# Patient Record
Sex: FEMALE | Race: WHITE | NOT HISPANIC OR LATINO
[De-identification: names, ages, dates, MRNs, and addresses within clinical notes are randomized per-mention and may not be internally consistent; named-entity substitution may affect disease eponyms.]

---

## 2017-02-20 ENCOUNTER — APPOINTMENT (OUTPATIENT)
Dept: SURGERY | Facility: CLINIC | Age: 67
End: 2017-02-20

## 2017-03-31 ENCOUNTER — APPOINTMENT (OUTPATIENT)
Dept: ULTRASOUND IMAGING | Facility: CLINIC | Age: 67
End: 2017-03-31

## 2017-03-31 ENCOUNTER — OUTPATIENT (OUTPATIENT)
Dept: OUTPATIENT SERVICES | Facility: HOSPITAL | Age: 67
LOS: 1 days | End: 2017-03-31
Payer: COMMERCIAL

## 2017-03-31 ENCOUNTER — APPOINTMENT (OUTPATIENT)
Dept: MAMMOGRAPHY | Facility: CLINIC | Age: 67
End: 2017-03-31

## 2017-03-31 DIAGNOSIS — N60.02 SOLITARY CYST OF LEFT BREAST: Chronic | ICD-10-CM

## 2017-03-31 DIAGNOSIS — Z98.89 OTHER SPECIFIED POSTPROCEDURAL STATES: Chronic | ICD-10-CM

## 2017-03-31 DIAGNOSIS — Z00.8 ENCOUNTER FOR OTHER GENERAL EXAMINATION: ICD-10-CM

## 2017-03-31 PROCEDURE — 77066 DX MAMMO INCL CAD BI: CPT

## 2017-03-31 PROCEDURE — 76641 ULTRASOUND BREAST COMPLETE: CPT

## 2017-03-31 PROCEDURE — G0279: CPT

## 2017-11-22 ENCOUNTER — APPOINTMENT (OUTPATIENT)
Dept: SURGERY | Facility: CLINIC | Age: 67
End: 2017-11-22
Payer: COMMERCIAL

## 2017-11-22 PROCEDURE — 99213K: CUSTOM

## 2018-04-11 ENCOUNTER — OUTPATIENT (OUTPATIENT)
Dept: OUTPATIENT SERVICES | Facility: HOSPITAL | Age: 68
LOS: 1 days | End: 2018-04-11
Payer: COMMERCIAL

## 2018-04-11 ENCOUNTER — APPOINTMENT (OUTPATIENT)
Dept: MAMMOGRAPHY | Facility: CLINIC | Age: 68
End: 2018-04-11

## 2018-04-11 ENCOUNTER — APPOINTMENT (OUTPATIENT)
Dept: ULTRASOUND IMAGING | Facility: CLINIC | Age: 68
End: 2018-04-11

## 2018-04-11 DIAGNOSIS — N60.02 SOLITARY CYST OF LEFT BREAST: Chronic | ICD-10-CM

## 2018-04-11 DIAGNOSIS — Z00.8 ENCOUNTER FOR OTHER GENERAL EXAMINATION: ICD-10-CM

## 2018-04-11 DIAGNOSIS — Z98.89 OTHER SPECIFIED POSTPROCEDURAL STATES: Chronic | ICD-10-CM

## 2018-04-11 PROCEDURE — G0279: CPT

## 2018-04-11 PROCEDURE — 76641 ULTRASOUND BREAST COMPLETE: CPT | Mod: 26,LT

## 2018-04-11 PROCEDURE — 77066 DX MAMMO INCL CAD BI: CPT | Mod: 26

## 2018-04-11 PROCEDURE — G0279: CPT | Mod: 26

## 2018-04-11 PROCEDURE — 77066 DX MAMMO INCL CAD BI: CPT

## 2018-04-11 PROCEDURE — 76641 ULTRASOUND BREAST COMPLETE: CPT

## 2018-10-10 ENCOUNTER — APPOINTMENT (OUTPATIENT)
Dept: SURGERY | Facility: CLINIC | Age: 68
End: 2018-10-10
Payer: COMMERCIAL

## 2018-10-10 PROCEDURE — 99213K: CUSTOM

## 2019-04-22 ENCOUNTER — APPOINTMENT (OUTPATIENT)
Dept: MAMMOGRAPHY | Facility: CLINIC | Age: 69
End: 2019-04-22
Payer: COMMERCIAL

## 2019-04-22 ENCOUNTER — OUTPATIENT (OUTPATIENT)
Dept: OUTPATIENT SERVICES | Facility: HOSPITAL | Age: 69
LOS: 1 days | End: 2019-04-22
Payer: COMMERCIAL

## 2019-04-22 ENCOUNTER — APPOINTMENT (OUTPATIENT)
Dept: ULTRASOUND IMAGING | Facility: CLINIC | Age: 69
End: 2019-04-22
Payer: COMMERCIAL

## 2019-04-22 DIAGNOSIS — N60.02 SOLITARY CYST OF LEFT BREAST: Chronic | ICD-10-CM

## 2019-04-22 DIAGNOSIS — Z98.89 OTHER SPECIFIED POSTPROCEDURAL STATES: Chronic | ICD-10-CM

## 2019-04-22 DIAGNOSIS — Z00.8 ENCOUNTER FOR OTHER GENERAL EXAMINATION: ICD-10-CM

## 2019-04-22 PROCEDURE — 77063 BREAST TOMOSYNTHESIS BI: CPT

## 2019-04-22 PROCEDURE — 77067 SCR MAMMO BI INCL CAD: CPT

## 2019-04-22 PROCEDURE — 76641 ULTRASOUND BREAST COMPLETE: CPT | Mod: 26,LT

## 2019-04-22 PROCEDURE — 77063 BREAST TOMOSYNTHESIS BI: CPT | Mod: 26

## 2019-04-22 PROCEDURE — 77067 SCR MAMMO BI INCL CAD: CPT | Mod: 26

## 2019-04-22 PROCEDURE — 76641 ULTRASOUND BREAST COMPLETE: CPT | Mod: 26,RT

## 2019-04-22 PROCEDURE — 76641 ULTRASOUND BREAST COMPLETE: CPT

## 2019-10-14 ENCOUNTER — APPOINTMENT (OUTPATIENT)
Dept: SURGERY | Facility: CLINIC | Age: 69
End: 2019-10-14
Payer: COMMERCIAL

## 2019-10-14 PROCEDURE — 99213K: CUSTOM

## 2020-05-18 ENCOUNTER — RESULT REVIEW (OUTPATIENT)
Age: 70
End: 2020-05-18

## 2020-05-18 ENCOUNTER — APPOINTMENT (OUTPATIENT)
Dept: ULTRASOUND IMAGING | Facility: CLINIC | Age: 70
End: 2020-05-18
Payer: COMMERCIAL

## 2020-05-18 ENCOUNTER — APPOINTMENT (OUTPATIENT)
Dept: MAMMOGRAPHY | Facility: CLINIC | Age: 70
End: 2020-05-18
Payer: COMMERCIAL

## 2020-05-18 ENCOUNTER — OUTPATIENT (OUTPATIENT)
Dept: OUTPATIENT SERVICES | Facility: HOSPITAL | Age: 70
LOS: 1 days | End: 2020-05-18
Payer: COMMERCIAL

## 2020-05-18 DIAGNOSIS — Z98.89 OTHER SPECIFIED POSTPROCEDURAL STATES: Chronic | ICD-10-CM

## 2020-05-18 DIAGNOSIS — N60.02 SOLITARY CYST OF LEFT BREAST: Chronic | ICD-10-CM

## 2020-05-18 DIAGNOSIS — Z00.8 ENCOUNTER FOR OTHER GENERAL EXAMINATION: ICD-10-CM

## 2020-05-18 PROCEDURE — 76641 ULTRASOUND BREAST COMPLETE: CPT | Mod: 26,RT

## 2020-05-18 PROCEDURE — 77067 SCR MAMMO BI INCL CAD: CPT | Mod: 26

## 2020-05-18 PROCEDURE — 76641 ULTRASOUND BREAST COMPLETE: CPT | Mod: 26,LT

## 2020-05-18 PROCEDURE — 76641 ULTRASOUND BREAST COMPLETE: CPT

## 2020-05-18 PROCEDURE — 77063 BREAST TOMOSYNTHESIS BI: CPT | Mod: 26

## 2020-05-18 PROCEDURE — 77063 BREAST TOMOSYNTHESIS BI: CPT

## 2020-05-18 PROCEDURE — 77067 SCR MAMMO BI INCL CAD: CPT

## 2020-11-23 ENCOUNTER — APPOINTMENT (OUTPATIENT)
Dept: SURGERY | Facility: CLINIC | Age: 70
End: 2020-11-23
Payer: COMMERCIAL

## 2020-11-23 PROCEDURE — 99213K: CUSTOM

## 2021-05-19 ENCOUNTER — OUTPATIENT (OUTPATIENT)
Dept: OUTPATIENT SERVICES | Facility: HOSPITAL | Age: 71
LOS: 1 days | End: 2021-05-19
Payer: COMMERCIAL

## 2021-05-19 ENCOUNTER — RESULT REVIEW (OUTPATIENT)
Age: 71
End: 2021-05-19

## 2021-05-19 ENCOUNTER — APPOINTMENT (OUTPATIENT)
Dept: MAMMOGRAPHY | Facility: CLINIC | Age: 71
End: 2021-05-19
Payer: COMMERCIAL

## 2021-05-19 ENCOUNTER — APPOINTMENT (OUTPATIENT)
Dept: ULTRASOUND IMAGING | Facility: CLINIC | Age: 71
End: 2021-05-19
Payer: COMMERCIAL

## 2021-05-19 DIAGNOSIS — Z98.89 OTHER SPECIFIED POSTPROCEDURAL STATES: Chronic | ICD-10-CM

## 2021-05-19 DIAGNOSIS — N60.02 SOLITARY CYST OF LEFT BREAST: Chronic | ICD-10-CM

## 2021-05-19 DIAGNOSIS — Z00.8 ENCOUNTER FOR OTHER GENERAL EXAMINATION: ICD-10-CM

## 2021-05-19 PROCEDURE — 77065 DX MAMMO INCL CAD UNI: CPT

## 2021-05-19 PROCEDURE — 77063 BREAST TOMOSYNTHESIS BI: CPT

## 2021-05-19 PROCEDURE — 77067 SCR MAMMO BI INCL CAD: CPT | Mod: 26,59

## 2021-05-19 PROCEDURE — 77065 DX MAMMO INCL CAD UNI: CPT | Mod: 26,GG,RT

## 2021-05-19 PROCEDURE — 77063 BREAST TOMOSYNTHESIS BI: CPT | Mod: 26,59

## 2021-05-19 PROCEDURE — 77067 SCR MAMMO BI INCL CAD: CPT

## 2021-05-19 PROCEDURE — 76641 ULTRASOUND BREAST COMPLETE: CPT | Mod: 26,RT

## 2021-05-19 PROCEDURE — 76641 ULTRASOUND BREAST COMPLETE: CPT | Mod: 26,LT

## 2021-05-19 PROCEDURE — 76641 ULTRASOUND BREAST COMPLETE: CPT

## 2021-05-24 ENCOUNTER — RESULT REVIEW (OUTPATIENT)
Age: 71
End: 2021-05-24

## 2021-05-24 ENCOUNTER — APPOINTMENT (OUTPATIENT)
Dept: ULTRASOUND IMAGING | Facility: CLINIC | Age: 71
End: 2021-05-24
Payer: COMMERCIAL

## 2021-05-24 ENCOUNTER — OUTPATIENT (OUTPATIENT)
Dept: OUTPATIENT SERVICES | Facility: HOSPITAL | Age: 71
LOS: 1 days | End: 2021-05-24
Payer: COMMERCIAL

## 2021-05-24 DIAGNOSIS — Z98.89 OTHER SPECIFIED POSTPROCEDURAL STATES: Chronic | ICD-10-CM

## 2021-05-24 DIAGNOSIS — N60.02 SOLITARY CYST OF LEFT BREAST: Chronic | ICD-10-CM

## 2021-05-24 DIAGNOSIS — R92.8 OTHER ABNORMAL AND INCONCLUSIVE FINDINGS ON DIAGNOSTIC IMAGING OF BREAST: ICD-10-CM

## 2021-05-24 DIAGNOSIS — Z00.8 ENCOUNTER FOR OTHER GENERAL EXAMINATION: ICD-10-CM

## 2021-05-24 PROCEDURE — 88305 TISSUE EXAM BY PATHOLOGIST: CPT | Mod: 26

## 2021-05-24 PROCEDURE — 88341 IMHCHEM/IMCYTCHM EA ADD ANTB: CPT

## 2021-05-24 PROCEDURE — 19083 BX BREAST 1ST LESION US IMAG: CPT

## 2021-05-24 PROCEDURE — 88360 TUMOR IMMUNOHISTOCHEM/MANUAL: CPT

## 2021-05-24 PROCEDURE — 77065 DX MAMMO INCL CAD UNI: CPT | Mod: 26,RT

## 2021-05-24 PROCEDURE — 88360 TUMOR IMMUNOHISTOCHEM/MANUAL: CPT | Mod: 26

## 2021-05-24 PROCEDURE — 77065 DX MAMMO INCL CAD UNI: CPT

## 2021-05-24 PROCEDURE — 88305 TISSUE EXAM BY PATHOLOGIST: CPT

## 2021-05-24 PROCEDURE — 88342 IMHCHEM/IMCYTCHM 1ST ANTB: CPT | Mod: 26,59

## 2021-05-24 PROCEDURE — A4648: CPT

## 2021-05-24 PROCEDURE — 19083 BX BREAST 1ST LESION US IMAG: CPT | Mod: RT

## 2021-05-27 ENCOUNTER — APPOINTMENT (OUTPATIENT)
Dept: MRI IMAGING | Facility: CLINIC | Age: 71
End: 2021-05-27
Payer: COMMERCIAL

## 2021-05-27 ENCOUNTER — OUTPATIENT (OUTPATIENT)
Dept: OUTPATIENT SERVICES | Facility: HOSPITAL | Age: 71
LOS: 1 days | End: 2021-05-27
Payer: COMMERCIAL

## 2021-05-27 ENCOUNTER — RESULT REVIEW (OUTPATIENT)
Age: 71
End: 2021-05-27

## 2021-05-27 DIAGNOSIS — N60.02 SOLITARY CYST OF LEFT BREAST: Chronic | ICD-10-CM

## 2021-05-27 DIAGNOSIS — Z98.89 OTHER SPECIFIED POSTPROCEDURAL STATES: Chronic | ICD-10-CM

## 2021-05-27 DIAGNOSIS — Z00.8 ENCOUNTER FOR OTHER GENERAL EXAMINATION: ICD-10-CM

## 2021-05-27 PROCEDURE — A9585: CPT

## 2021-05-27 PROCEDURE — 77049 MRI BREAST C-+ W/CAD BI: CPT | Mod: 26

## 2021-05-27 PROCEDURE — C8908: CPT

## 2021-05-27 PROCEDURE — C8937: CPT

## 2021-06-08 ENCOUNTER — OUTPATIENT (OUTPATIENT)
Dept: OUTPATIENT SERVICES | Facility: HOSPITAL | Age: 71
LOS: 1 days | End: 2021-06-08
Payer: COMMERCIAL

## 2021-06-08 VITALS
HEART RATE: 68 BPM | HEIGHT: 62 IN | TEMPERATURE: 98 F | DIASTOLIC BLOOD PRESSURE: 72 MMHG | OXYGEN SATURATION: 98 % | SYSTOLIC BLOOD PRESSURE: 118 MMHG | RESPIRATION RATE: 14 BRPM | WEIGHT: 136.91 LBS

## 2021-06-08 DIAGNOSIS — Z98.89 OTHER SPECIFIED POSTPROCEDURAL STATES: Chronic | ICD-10-CM

## 2021-06-08 DIAGNOSIS — C50.911 MALIGNANT NEOPLASM OF UNSPECIFIED SITE OF RIGHT FEMALE BREAST: ICD-10-CM

## 2021-06-08 DIAGNOSIS — C80.1 MALIGNANT (PRIMARY) NEOPLASM, UNSPECIFIED: ICD-10-CM

## 2021-06-08 DIAGNOSIS — Z98.890 OTHER SPECIFIED POSTPROCEDURAL STATES: Chronic | ICD-10-CM

## 2021-06-08 DIAGNOSIS — N60.02 SOLITARY CYST OF LEFT BREAST: Chronic | ICD-10-CM

## 2021-06-08 LAB
ALBUMIN SERPL ELPH-MCNC: 4.4 G/DL — SIGNIFICANT CHANGE UP (ref 3.3–5)
ALP SERPL-CCNC: 49 U/L — SIGNIFICANT CHANGE UP (ref 40–120)
ALT FLD-CCNC: 36 U/L — HIGH (ref 4–33)
ANION GAP SERPL CALC-SCNC: 14 MMOL/L — SIGNIFICANT CHANGE UP (ref 7–14)
AST SERPL-CCNC: 29 U/L — SIGNIFICANT CHANGE UP (ref 4–32)
BILIRUB SERPL-MCNC: 0.8 MG/DL — SIGNIFICANT CHANGE UP (ref 0.2–1.2)
BUN SERPL-MCNC: 31 MG/DL — HIGH (ref 7–23)
CALCIUM SERPL-MCNC: 9.3 MG/DL — SIGNIFICANT CHANGE UP (ref 8.4–10.5)
CHLORIDE SERPL-SCNC: 95 MMOL/L — LOW (ref 98–107)
CO2 SERPL-SCNC: 25 MMOL/L — SIGNIFICANT CHANGE UP (ref 22–31)
CREAT SERPL-MCNC: 1.19 MG/DL — SIGNIFICANT CHANGE UP (ref 0.5–1.3)
GLUCOSE SERPL-MCNC: 94 MG/DL — SIGNIFICANT CHANGE UP (ref 70–99)
HCT VFR BLD CALC: 39 % — SIGNIFICANT CHANGE UP (ref 34.5–45)
HGB BLD-MCNC: 13.5 G/DL — SIGNIFICANT CHANGE UP (ref 11.5–15.5)
MCHC RBC-ENTMCNC: 30.2 PG — SIGNIFICANT CHANGE UP (ref 27–34)
MCHC RBC-ENTMCNC: 34.6 GM/DL — SIGNIFICANT CHANGE UP (ref 32–36)
MCV RBC AUTO: 87.2 FL — SIGNIFICANT CHANGE UP (ref 80–100)
NRBC # BLD: 0 /100 WBCS — SIGNIFICANT CHANGE UP
NRBC # FLD: 0 K/UL — SIGNIFICANT CHANGE UP
PLATELET # BLD AUTO: 339 K/UL — SIGNIFICANT CHANGE UP (ref 150–400)
POTASSIUM SERPL-MCNC: 3.4 MMOL/L — LOW (ref 3.5–5.3)
POTASSIUM SERPL-SCNC: 3.4 MMOL/L — LOW (ref 3.5–5.3)
PROT SERPL-MCNC: 7.2 G/DL — SIGNIFICANT CHANGE UP (ref 6–8.3)
RBC # BLD: 4.47 M/UL — SIGNIFICANT CHANGE UP (ref 3.8–5.2)
RBC # FLD: 13 % — SIGNIFICANT CHANGE UP (ref 10.3–14.5)
SODIUM SERPL-SCNC: 134 MMOL/L — LOW (ref 135–145)
WBC # BLD: 8.01 K/UL — SIGNIFICANT CHANGE UP (ref 3.8–10.5)
WBC # FLD AUTO: 8.01 K/UL — SIGNIFICANT CHANGE UP (ref 3.8–10.5)

## 2021-06-08 PROCEDURE — 93010 ELECTROCARDIOGRAM REPORT: CPT

## 2021-06-08 RX ORDER — SODIUM CHLORIDE 9 MG/ML
1000 INJECTION, SOLUTION INTRAVENOUS
Refills: 0 | Status: DISCONTINUED | OUTPATIENT
Start: 2021-06-15 | End: 2021-06-15

## 2021-06-08 NOTE — H&P PST ADULT - NSICDXPASTSURGICALHX_GEN_ALL_CORE_FT
PAST SURGICAL HISTORY:  Breast cyst, left 1997    H/O partial thyroidectomy 1995 Left     PAST SURGICAL HISTORY:  Breast cyst, left 1997    H/O partial thyroidectomy 1995 Left    S/P breast lumpectomy right 2014

## 2021-06-08 NOTE — H&P PST ADULT - HISTORY OF PRESENT ILLNESS
72y/o female scheduled for bilateral simple mastectomies on 6/15/2021.  Pt states, "hx of right breast cancer, s/p lumpectomy 2014, received chemo and radiation, now on letrozole.  Went for f/y mammogram and US found mass in right breast, biopsy positive for malignancy."

## 2021-06-08 NOTE — H&P PST ADULT - NSICDXPASTMEDICALHX_GEN_ALL_CORE_FT
PAST MEDICAL HISTORY:  H/O thyroid nodule     Hypertension      PAST MEDICAL HISTORY:  H/O thyroid nodule     Hypertension     Malignant neoplasm of breast s/p chemo and radiation 2014

## 2021-06-08 NOTE — H&P PST ADULT - NSICDXPROBLEM_GEN_ALL_CORE_FT
PROBLEM DIAGNOSES  Problem: Malignant neoplasm  Assessment and Plan: Pt scheduled for bilateral simple mastectomies on 6/15/2021.  labs done results pending, ekg done.  Hibiclens provided-  written and verbal instructions given, pt able to verabalize understanding.  Preop teaching done, pt able to verabalize understanding.   meds day of surgery- losartan, pepcid.  OR booking notified of ALO precautions.

## 2021-06-10 PROBLEM — C50.919 MALIGNANT NEOPLASM OF UNSPECIFIED SITE OF UNSPECIFIED FEMALE BREAST: Chronic | Status: ACTIVE | Noted: 2021-06-08

## 2021-06-12 ENCOUNTER — APPOINTMENT (OUTPATIENT)
Dept: DISASTER EMERGENCY | Facility: CLINIC | Age: 71
End: 2021-06-12

## 2021-06-12 DIAGNOSIS — Z01.818 ENCOUNTER FOR OTHER PREPROCEDURAL EXAMINATION: ICD-10-CM

## 2021-06-13 LAB — SARS-COV-2 N GENE NPH QL NAA+PROBE: NOT DETECTED

## 2021-06-15 ENCOUNTER — RESULT REVIEW (OUTPATIENT)
Age: 71
End: 2021-06-15

## 2021-06-15 ENCOUNTER — APPOINTMENT (OUTPATIENT)
Dept: SURGERY | Facility: HOSPITAL | Age: 71
End: 2021-06-15

## 2021-06-15 ENCOUNTER — INPATIENT (INPATIENT)
Facility: HOSPITAL | Age: 71
LOS: 0 days | Discharge: HOME | End: 2021-06-16
Attending: SURGERY | Admitting: SURGERY
Payer: COMMERCIAL

## 2021-06-15 VITALS
HEART RATE: 60 BPM | HEIGHT: 62 IN | DIASTOLIC BLOOD PRESSURE: 70 MMHG | WEIGHT: 134.92 LBS | RESPIRATION RATE: 12 BRPM | SYSTOLIC BLOOD PRESSURE: 148 MMHG

## 2021-06-15 DIAGNOSIS — Z90.10 ACQUIRED ABSENCE OF UNSPECIFIED BREAST AND NIPPLE: Chronic | ICD-10-CM

## 2021-06-15 DIAGNOSIS — N60.02 SOLITARY CYST OF LEFT BREAST: Chronic | ICD-10-CM

## 2021-06-15 DIAGNOSIS — C50.911 MALIGNANT NEOPLASM OF UNSPECIFIED SITE OF RIGHT FEMALE BREAST: ICD-10-CM

## 2021-06-15 DIAGNOSIS — Z98.89 OTHER SPECIFIED POSTPROCEDURAL STATES: Chronic | ICD-10-CM

## 2021-06-15 DIAGNOSIS — Z98.890 OTHER SPECIFIED POSTPROCEDURAL STATES: Chronic | ICD-10-CM

## 2021-06-15 PROCEDURE — 88307 TISSUE EXAM BY PATHOLOGIST: CPT | Mod: 26

## 2021-06-15 RX ORDER — HYDROMORPHONE HYDROCHLORIDE 2 MG/ML
0.5 INJECTION INTRAMUSCULAR; INTRAVENOUS; SUBCUTANEOUS
Refills: 0 | Status: DISCONTINUED | OUTPATIENT
Start: 2021-06-15 | End: 2021-06-15

## 2021-06-15 RX ORDER — LOSARTAN POTASSIUM 100 MG/1
100 TABLET, FILM COATED ORAL DAILY
Refills: 0 | Status: DISCONTINUED | OUTPATIENT
Start: 2021-06-15 | End: 2021-06-16

## 2021-06-15 RX ORDER — FENTANYL CITRATE 50 UG/ML
25 INJECTION INTRAVENOUS
Refills: 0 | Status: DISCONTINUED | OUTPATIENT
Start: 2021-06-15 | End: 2021-06-15

## 2021-06-15 RX ORDER — HYDROCHLOROTHIAZIDE 25 MG
12.5 TABLET ORAL DAILY
Refills: 0 | Status: DISCONTINUED | OUTPATIENT
Start: 2021-06-15 | End: 2021-06-16

## 2021-06-15 RX ORDER — OXYCODONE HYDROCHLORIDE 5 MG/1
5 TABLET ORAL EVERY 6 HOURS
Refills: 0 | Status: DISCONTINUED | OUTPATIENT
Start: 2021-06-15 | End: 2021-06-16

## 2021-06-15 RX ORDER — LETROZOLE 2.5 MG/1
2.5 TABLET, FILM COATED ORAL DAILY
Refills: 0 | Status: DISCONTINUED | OUTPATIENT
Start: 2021-06-15 | End: 2021-06-15

## 2021-06-15 RX ORDER — HEPARIN SODIUM 5000 [USP'U]/ML
5000 INJECTION INTRAVENOUS; SUBCUTANEOUS EVERY 12 HOURS
Refills: 0 | Status: DISCONTINUED | OUTPATIENT
Start: 2021-06-15 | End: 2021-06-16

## 2021-06-15 RX ORDER — ONDANSETRON 8 MG/1
4 TABLET, FILM COATED ORAL ONCE
Refills: 0 | Status: DISCONTINUED | OUTPATIENT
Start: 2021-06-15 | End: 2021-06-15

## 2021-06-15 RX ORDER — ACETAMINOPHEN 500 MG
650 TABLET ORAL EVERY 6 HOURS
Refills: 0 | Status: DISCONTINUED | OUTPATIENT
Start: 2021-06-15 | End: 2021-06-16

## 2021-06-15 RX ADMIN — HEPARIN SODIUM 5000 UNIT(S): 5000 INJECTION INTRAVENOUS; SUBCUTANEOUS at 17:28

## 2021-06-15 NOTE — ASU PREOP CHECKLIST - COMMENTS
losartan, letrozole and famotidine at 5:30am with sip water losartan, letrozole, HCTZ and famotidine at 5:30am with sip water

## 2021-06-15 NOTE — CHART NOTE - NSCHARTNOTEFT_GEN_A_CORE
Post Operative Note  Patient: SHADIA RIVAS 71y (1950) Female   MRN: [id number]  Location: [place] [address]  Visit: 06-15-21 Inpatient  Date: 06-15-21 @ 17:21    Procedure: S/P bilateral simple mastectomies, 2 hemovac    Subjective: Patient seen and examined post operatively in the PACU. Patient states pain is controlled. Denies nausea, vomiting. Tolerating regular diet. Denies fever, chills, cough.      Objective:  Vitals: T(F): 97.8 (06-15-21 @ 15:00), Max: 97.8 (06-15-21 @ 15:00)  HR: 83 (06-15-21 @ 17:00)  BP: 126/55 (06-15-21 @ 17:00) (102/54 - 148/70)  RR: 19 (06-15-21 @ 17:00)  SpO2: 97% (06-15-21 @ 17:00)  Vent Settings:     In:   06-15-21 @ 07:01  -  06-15-21 @ 17:21  --------------------------------------------------------  IN: 0 mL      IV Fluids: lactated ringers. 1000 milliLiter(s) (30 mL/Hr) IV Continuous <Continuous>      Out:   06-15-21 @ 07:01  -  06-15-21 @ 17:21  --------------------------------------------------------  OUT: 0 mL      EBL:     Voided Urine:   06-15-21 @ 07:01  -  06-15-21 @ 17:21  --------------------------------------------------------  OUT: 0 mL      Mojica Catheter: yes no   Drains:   ANSON:   06-15-21 @ 07:01  -  06-15-21 @ 17:21  --------------------------------------------------------  OUT: 0 mL        Physical Examination:  General: NAD, resting comfortably in bed  HEENT: Normocephalic atraumatic  Respiratory: Nonlabored respirations, normal CW expansion.  Cardio: S1S2, regular rate and rhythm.  Chest wall: CW dressed with compression dressing, no strike through, bilateral hemovacs with minimal sanguinous output.   Abdomen: soft, nondistended, nontender.  Vascular: extremities are warm and well perfused.     Imaging:  No post-op imaging studies    Assessment:  71yFemale patient S/p bilateral simple mastecomies recovering well post operatively.    Plan:  - hemovac drain teaching  - Pain control PRN  - Diet: regular  - Activity: OOB/Ambulate as tolerated.  - DVT ppx: [facility]    Date/Time: 06-15-21 @ 17:21

## 2021-06-16 ENCOUNTER — TRANSCRIPTION ENCOUNTER (OUTPATIENT)
Age: 71
End: 2021-06-16

## 2021-06-16 VITALS
TEMPERATURE: 98 F | RESPIRATION RATE: 18 BRPM | SYSTOLIC BLOOD PRESSURE: 115 MMHG | DIASTOLIC BLOOD PRESSURE: 55 MMHG | OXYGEN SATURATION: 97 % | HEART RATE: 63 BPM

## 2021-06-16 LAB
COVID-19 SPIKE DOMAIN AB INTERP: POSITIVE
COVID-19 SPIKE DOMAIN ANTIBODY RESULT: >250 U/ML — HIGH
SARS-COV-2 IGG+IGM SERPL QL IA: >250 U/ML — HIGH
SARS-COV-2 IGG+IGM SERPL QL IA: POSITIVE

## 2021-06-16 RX ORDER — OXYCODONE HYDROCHLORIDE 5 MG/1
1 TABLET ORAL
Qty: 15 | Refills: 0
Start: 2021-06-16

## 2021-06-16 RX ORDER — OXYCODONE HYDROCHLORIDE 5 MG/1
1 TABLET ORAL
Qty: 0 | Refills: 0 | DISCHARGE
Start: 2021-06-16

## 2021-06-16 RX ORDER — ACETAMINOPHEN 500 MG
2 TABLET ORAL
Qty: 0 | Refills: 0 | DISCHARGE
Start: 2021-06-16

## 2021-06-16 RX ADMIN — HEPARIN SODIUM 5000 UNIT(S): 5000 INJECTION INTRAVENOUS; SUBCUTANEOUS at 05:37

## 2021-06-16 RX ADMIN — Medication 12.5 MILLIGRAM(S): at 05:36

## 2021-06-16 RX ADMIN — LOSARTAN POTASSIUM 100 MILLIGRAM(S): 100 TABLET, FILM COATED ORAL at 05:36

## 2021-06-16 NOTE — PROGRESS NOTE ADULT - ASSESSMENT
71F POD1 bilateral simple mastectomies recovering appropriately    Plan:  - hemovac drain teaching  - Pain control PRN  - Diet: regular  - Activity: OOB/Ambulate as tolerated.  - DVT ppx: [facility]    Surgery Team D  [id number]

## 2021-06-16 NOTE — DISCHARGE NOTE NURSING/CASE MANAGEMENT/SOCIAL WORK - NSDCPECAREGIVERED_GEN_ALL_CORE
carenotes on mastectomy, caring for my incision action plan, hemovac drain carenotes, managing pain handout

## 2021-06-16 NOTE — DISCHARGE NOTE PROVIDER - CARE PROVIDER_API CALL
Zunilda Yuen)  [facility]  [address], [address]  [place], [place] [id number]  Phone: [phone]  Fax: [phone]  Follow Up Time: 1 week

## 2021-06-16 NOTE — DISCHARGE NOTE PROVIDER - NSDCMRMEDTOKEN_GEN_ALL_CORE_FT
acetaminophen 325 mg oral capsule: 2 tab(s) orally every 6 hours, As Needed mild pain  hydroCHLOROthiazide 12.5 mg oral capsule: 1 cap(s) orally once a day  letrozole 2.5 mg oral tablet: 1 tab(s) orally once a day  losartan 100 mg oral tablet: 1 tab(s) orally once a day  oxyCODONE 5 mg oral tablet: 1 tab(s) orally every 4 hours, As Needed for moderate pain   acetaminophen 325 mg oral capsule: 2 tab(s) orally every 6 hours, As Needed mild pain  hydroCHLOROthiazide 12.5 mg oral capsule: 1 cap(s) orally once a day  letrozole 2.5 mg oral tablet: 1 tab(s) orally once a day  losartan 100 mg oral tablet: 1 tab(s) orally once a day

## 2021-06-16 NOTE — PROGRESS NOTE ADULT - SUBJECTIVE AND OBJECTIVE BOX
Surgery Progress Note    24Hr Events:  - sp bilateral simple mastectomies    Overnight: No acute events    SUBJECTIVE: Pt seen and examined at bedside.     Vital Signs Last 24 Hrs  T(C): 36.3 (15 Nikolay 2021 21:56), Max: 36.8 (15 Nikolay 2021 18:49)  T(F): 97.4 (15 Nikolay 2021 21:56), Max: 98.2 (15 Nikolay 2021 18:49)  HR: 76 (15 Nikolay 2021 21:56) (56 - 83)  BP: 114/64 (15 Nikolay 2021 21:56) (102/54 - 148/70)  BP(mean): 72 (15 Nikolay 2021 17:00) (66 - 79)  RR: 18 (15 Nikolay 2021 21:56) (12 - 19)  SpO2: 96% (15 Nikolay 2021 21:56) (93% - 97%)    Physical Examination:  General: NAD, resting comfortably in bed  Respiratory: Nonlabored respirations  Cardio: pulses regularly present  Chest wall: CW dressed with compression dressing, no strike through, bilateral hemovacs with minimal sanguinous output.   Abdomen: soft, nondistended, nontender.  Vascular: extremities are warm and well perfused.     LABS:                INs and OUTs:    06-15-21 @ 07:01  -  06-16-21 @ 01:12  --------------------------------------------------------  IN: 0 mL / OUT: 1250 mL / NET: -1250 mL        Medications:  MEDICATIONS  (STANDING):  heparin   Injectable 5000 Unit(s) SubCutaneous every 12 hours  hydrochlorothiazide 12.5 milliGRAM(s) Oral daily  losartan 100 milliGRAM(s) Oral daily    MEDICATIONS  (PRN):  acetaminophen    Suspension .. 650 milliGRAM(s) Oral every 6 hours PRN Mild Pain (1 - 3), Moderate Pain (4 - 6)  oxyCODONE    IR 5 milliGRAM(s) Oral every 6 hours PRN Severe Pain (7 - 10)   Surgery Progress Note    24Hr Events:  - sp bilateral simple mastectomies    Overnight: No acute events    SUBJECTIVE: Pt seen and examined at bedside. Patient reports pain as controlled, denies nausea, vomiting, fever, chills.    Vital Signs Last 24 Hrs  T(C): 36.3 (15 Nikolay 2021 21:56), Max: 36.8 (15 Nikolay 2021 18:49)  T(F): 97.4 (15 Nikolay 2021 21:56), Max: 98.2 (15 Nikolay 2021 18:49)  HR: 76 (15 Nikolay 2021 21:56) (56 - 83)  BP: 114/64 (15 Nikolay 2021 21:56) (102/54 - 148/70)  BP(mean): 72 (15 Nikolay 2021 17:00) (66 - 79)  RR: 18 (15 Nikolay 2021 21:56) (12 - 19)  SpO2: 96% (15 Nikolay 2021 21:56) (93% - 97%)    Physical Examination:  General: NAD, resting comfortably in bed  Respiratory: Nonlabored respirations, symmetric cw expansion  Cardio: pulses regularly present, warm and well perfused.  Chest wall: CW dressed with compression dressing, no strike through, bilateral hemovacs with minimal sanguinous output.   Abdomen: soft, nondistended, nontender.  MSK; no edema, no deformities.              INs and OUTs:    06-15-21 @ 07:01  -  06-16-21 @ 01:12  --------------------------------------------------------  IN: 0 mL / OUT: 1250 mL / NET: -1250 mL        Medications:  MEDICATIONS  (STANDING):  heparin   Injectable 5000 Unit(s) SubCutaneous every 12 hours  hydrochlorothiazide 12.5 milliGRAM(s) Oral daily  losartan 100 milliGRAM(s) Oral daily    MEDICATIONS  (PRN):  acetaminophen    Suspension .. 650 milliGRAM(s) Oral every 6 hours PRN Mild Pain (1 - 3), Moderate Pain (4 - 6)  oxyCODONE    IR 5 milliGRAM(s) Oral every 6 hours PRN Severe Pain (7 - 10)

## 2021-06-16 NOTE — DISCHARGE NOTE NURSING/CASE MANAGEMENT/SOCIAL WORK - NSDPDISTO_GEN_ALL_CORE
stable, bilateral chest dressings in place clean dry and intact, bilateral HVs to ss in place, tolerating diet, voiding well, oob/Home

## 2021-06-16 NOTE — DISCHARGE NOTE NURSING/CASE MANAGEMENT/SOCIAL WORK - PATIENT PORTAL LINK FT
You can access the FollowMyHealth Patient Portal offered by [facility] by registering at the following website: [email or web address]. By joining [employer]’s FollowMyHealth portal, you will also be able to view your health information using other applications (apps) compatible with our system.

## 2021-06-16 NOTE — DISCHARGE NOTE PROVIDER - NSDCFUADDINST_GEN_ALL_CORE_FT
WOUND CARE:  Remove outer bandage next day.  Please keep incisions clean and dry. Please do not Scrub or rub incisions. Do not use lotion or powder on incisions  BATHING: Please do not submerge wound underwater. You may shower and/or sponge bathe 48 hours after surgery.  ACTIVITY: Wear bra as desired or tolerated.  No heavy lifting or straining. Otherwise, you may return to your usual level of physical activity.   DIET: Return to your usual diet.  NOTIFY YOUR SURGEON IF: You have any bleeding that does not stop, any pus draining from your wound(s), any fever (over 100.4 F) or chills, persistent nausea/vomiting, persistent diarrhea, or if your pain is not controlled on your discharge pain medications.  FOLLOW-UP: Please follow up with your primary care physician in one week regarding your hospitalization.  Please follow up with your Dr. Yuen.  Call today for appointment within 1 week.   WOUND CARE:  Remove outer bandage next day.  Please keep incisions clean and dry. Please do not Scrub or rub incisions. Do not use lotion or powder on incisions.  HEMOVAC CARE: You have been taught how to care for and empty your hemovac drains.  Please document output and bring the list of outputs to your follow up appointment with Dr. Yuen.  Please call Dr. Yuen's office if drains begin filling up very quickly or drains a lot of rona bright red blood.  BATHING: Please do not submerge wound underwater. You may shower and/or sponge bathe 48 hours after surgery.  BLEEDING: After surgery, some blood may escape from under the tape.  It is of no consequence.  The paper tape may fall off after several days.  If not, Dr. Yuen will remove in her office.  BRUISING: As the days go by, black and blue areas may become more noticeable.  These areas will disappear within several weeks.  In addition the area around the incision may feel very hard and look swollen.  It is normal and it may take several months to subside.  ACTIVITY: Wear bra as desired or tolerated.  No heavy lifting or straining. Otherwise, you may return to your usual level of physical activity.   DIET: Return to your usual diet.  NOTIFY YOUR SURGEON IF: You have any bleeding that does not stop (Brisk bleeding through several new bandages), a warm or red chest, any pus draining from your wound(s), any fever (over 100.4 F) or chills, persistent nausea/vomiting, persistent diarrhea, or if your pain is not controlled on your discharge pain medications.  FOLLOW-UP: Please follow up with your primary care physician in one week regarding your hospitalization.  Please follow up with your Dr. Yuen.  Call today for appointment within 1 week.

## 2021-06-16 NOTE — DISCHARGE NOTE PROVIDER - NSDCCPCAREPLAN_GEN_ALL_CORE_FT
PRINCIPAL DISCHARGE DIAGNOSIS  Diagnosis: Breast cancer, right  Assessment and Plan of Treatment: s/p B/L Simple Mastectomies, primary closure, B/L Hemovacs

## 2021-06-16 NOTE — DISCHARGE NOTE PROVIDER - HOSPITAL COURSE
72y/o female admitted to [facility] for scheduled for bilateral simple mastectomies on 6/15/2021.  Per patient, she had hx of right breast cancer, s/p lumpectomy 2014, received chemo and radiation, now on letrozole.  Went for f/u mammogram and US found mass in right breast, biopsy positive for malignancy.     On 6/15/21, patient went to OR for Bilateral simple mastectomy, primary closure, ANSON Hemovacs x2 bilaterally placed.  Surgery went well.  Post operatively, patient did well.  Diet was advanced and tolerated.  Patient is ambulating and voiding without difficulty.  Patient was taught hemovac care.  Patient is stable for d/c home with follow up with Dr. Yuen in 1 week.  Patient understands and agrees with above.

## 2021-06-16 NOTE — DISCHARGE NOTE NURSING/CASE MANAGEMENT/SOCIAL WORK - NSDCPNINST_GEN_ALL_CORE
Notify Dr Yuen if you experience any increase in pain not relieved with medication, any redness, drainage or swelling around incisions any fever >100.5.  Drink plenty of fluids.  No heavy lifting or straining.  Empty hemovac drains as instructed and record on sheet provided.  Notify Dr Yuen if there is any foul odor, or large amounts of drainage or any rona blood.

## 2021-06-17 LAB — SURGICAL PATHOLOGY STUDY: SIGNIFICANT CHANGE UP

## 2021-06-21 ENCOUNTER — APPOINTMENT (OUTPATIENT)
Dept: SURGERY | Facility: CLINIC | Age: 71
End: 2021-06-21
Payer: COMMERCIAL

## 2021-06-21 PROCEDURE — 99024 POSTOP FOLLOW-UP VISIT: CPT

## 2021-06-28 ENCOUNTER — APPOINTMENT (OUTPATIENT)
Dept: SURGERY | Facility: CLINIC | Age: 71
End: 2021-06-28
Payer: COMMERCIAL

## 2021-06-28 PROCEDURE — 99024 POSTOP FOLLOW-UP VISIT: CPT

## 2021-07-08 ENCOUNTER — OUTPATIENT (OUTPATIENT)
Dept: OUTPATIENT SERVICES | Facility: HOSPITAL | Age: 71
LOS: 1 days | Discharge: HOME | End: 2021-07-08

## 2021-07-08 DIAGNOSIS — C50.919 MALIGNANT NEOPLASM OF UNSPECIFIED SITE OF UNSPECIFIED FEMALE BREAST: ICD-10-CM

## 2021-07-08 DIAGNOSIS — Z98.890 OTHER SPECIFIED POSTPROCEDURAL STATES: Chronic | ICD-10-CM

## 2021-07-08 DIAGNOSIS — Z98.89 OTHER SPECIFIED POSTPROCEDURAL STATES: Chronic | ICD-10-CM

## 2021-07-08 DIAGNOSIS — N60.02 SOLITARY CYST OF LEFT BREAST: Chronic | ICD-10-CM

## 2021-07-09 ENCOUNTER — RESULT REVIEW (OUTPATIENT)
Age: 71
End: 2021-07-09

## 2021-07-09 ENCOUNTER — APPOINTMENT (OUTPATIENT)
Dept: HEMATOLOGY ONCOLOGY | Facility: CLINIC | Age: 71
End: 2021-07-09
Payer: COMMERCIAL

## 2021-07-09 VITALS
HEART RATE: 74 BPM | WEIGHT: 136.66 LBS | TEMPERATURE: 97.9 F | RESPIRATION RATE: 16 BRPM | SYSTOLIC BLOOD PRESSURE: 147 MMHG | HEIGHT: 62 IN | BODY MASS INDEX: 25.15 KG/M2 | OXYGEN SATURATION: 97 % | DIASTOLIC BLOOD PRESSURE: 83 MMHG

## 2021-07-09 VITALS — SYSTOLIC BLOOD PRESSURE: 138 MMHG | DIASTOLIC BLOOD PRESSURE: 81 MMHG

## 2021-07-09 DIAGNOSIS — Z72.89 OTHER PROBLEMS RELATED TO LIFESTYLE: ICD-10-CM

## 2021-07-09 DIAGNOSIS — Z78.9 OTHER SPECIFIED HEALTH STATUS: ICD-10-CM

## 2021-07-09 DIAGNOSIS — Z80.8 FAMILY HISTORY OF MALIGNANT NEOPLASM OF OTHER ORGANS OR SYSTEMS: ICD-10-CM

## 2021-07-09 DIAGNOSIS — Z86.79 PERSONAL HISTORY OF OTHER DISEASES OF THE CIRCULATORY SYSTEM: ICD-10-CM

## 2021-07-09 LAB
BASOPHILS # BLD AUTO: 0.07 K/UL — SIGNIFICANT CHANGE UP (ref 0–0.2)
BASOPHILS NFR BLD AUTO: 0.7 % — SIGNIFICANT CHANGE UP (ref 0–2)
EOSINOPHIL # BLD AUTO: 0.16 K/UL — SIGNIFICANT CHANGE UP (ref 0–0.5)
EOSINOPHIL NFR BLD AUTO: 1.6 % — SIGNIFICANT CHANGE UP (ref 0–6)
HCT VFR BLD CALC: 38.6 % — SIGNIFICANT CHANGE UP (ref 34.5–45)
HGB BLD-MCNC: 12.9 G/DL — SIGNIFICANT CHANGE UP (ref 11.5–15.5)
IMM GRANULOCYTES NFR BLD AUTO: 0.4 % — SIGNIFICANT CHANGE UP (ref 0–1.5)
LYMPHOCYTES # BLD AUTO: 3.19 K/UL — SIGNIFICANT CHANGE UP (ref 1–3.3)
LYMPHOCYTES # BLD AUTO: 32.5 % — SIGNIFICANT CHANGE UP (ref 13–44)
MCHC RBC-ENTMCNC: 30.6 PG — SIGNIFICANT CHANGE UP (ref 27–34)
MCHC RBC-ENTMCNC: 33.4 G/DL — SIGNIFICANT CHANGE UP (ref 32–36)
MCV RBC AUTO: 91.5 FL — SIGNIFICANT CHANGE UP (ref 80–100)
MONOCYTES # BLD AUTO: 0.87 K/UL — SIGNIFICANT CHANGE UP (ref 0–0.9)
MONOCYTES NFR BLD AUTO: 8.9 % — SIGNIFICANT CHANGE UP (ref 2–14)
NEUTROPHILS # BLD AUTO: 5.5 K/UL — SIGNIFICANT CHANGE UP (ref 1.8–7.4)
NEUTROPHILS NFR BLD AUTO: 55.9 % — SIGNIFICANT CHANGE UP (ref 43–77)
NRBC # BLD: 0 /100 WBCS — SIGNIFICANT CHANGE UP (ref 0–0)
PLATELET # BLD AUTO: 395 K/UL — SIGNIFICANT CHANGE UP (ref 150–400)
RBC # BLD: 4.22 M/UL — SIGNIFICANT CHANGE UP (ref 3.8–5.2)
RBC # FLD: 13.6 % — SIGNIFICANT CHANGE UP (ref 10.3–14.5)
WBC # BLD: 9.83 K/UL — SIGNIFICANT CHANGE UP (ref 3.8–10.5)
WBC # FLD AUTO: 9.83 K/UL — SIGNIFICANT CHANGE UP (ref 3.8–10.5)

## 2021-07-09 PROCEDURE — 99205 OFFICE O/P NEW HI 60 MIN: CPT

## 2021-07-09 PROCEDURE — 99072 ADDL SUPL MATRL&STAF TM PHE: CPT

## 2021-07-12 LAB
ALBUMIN SERPL ELPH-MCNC: 4.4 G/DL
ALP BLD-CCNC: 59 U/L
ALT SERPL-CCNC: 21 U/L
ANION GAP SERPL CALC-SCNC: 15 MMOL/L
AST SERPL-CCNC: 24 U/L
BILIRUB SERPL-MCNC: 0.3 MG/DL
BUN SERPL-MCNC: 19 MG/DL
CALCIUM SERPL-MCNC: 10.6 MG/DL
CHLORIDE SERPL-SCNC: 102 MMOL/L
CO2 SERPL-SCNC: 26 MMOL/L
CREAT SERPL-MCNC: 0.96 MG/DL
GLUCOSE SERPL-MCNC: 94 MG/DL
HAV IGM SER QL: NONREACTIVE
HBV CORE IGM SER QL: NONREACTIVE
HBV SURFACE AG SER QL: NONREACTIVE
HCV AB SER QL: NONREACTIVE
HCV S/CO RATIO: 0.13 S/CO
POTASSIUM SERPL-SCNC: 3.7 MMOL/L
PROT SERPL-MCNC: 6.8 G/DL
SODIUM SERPL-SCNC: 143 MMOL/L

## 2021-07-14 PROBLEM — Z80.8 FAMILY HISTORY OF MALIGNANT MELANOMA: Status: ACTIVE | Noted: 2021-07-14

## 2021-07-14 PROBLEM — Z72.89 ALCOHOL USE: Status: ACTIVE | Noted: 2021-07-09

## 2021-07-14 PROBLEM — Z86.79 HISTORY OF HYPERTENSION: Status: RESOLVED | Noted: 2021-07-14 | Resolved: 2021-07-14

## 2021-07-14 PROBLEM — Z78.9 CURRENT NON-SMOKER: Status: ACTIVE | Noted: 2021-07-09

## 2021-07-15 NOTE — HISTORY OF PRESENT ILLNESS
[de-identified] : SHADIA RIVAS  is a 71 year old female here for initial consultation for management of right breast cancer.\par \par Pt initially presented at age 63 in 2/2014 with right breast lump. Bilateral mammogram showed focal asymmetry in right lower outer quadrant with US demonstrating irregular mass measuring 2.8cm.  She had a right lumpectomy 4/2014 and ALND by Dr. Shadia Yuen which showed a 2.6cm invasive ductal carcinoma grade 2 with LVI and PNI.  10/20 right axillary LN were positive for malignancy, +MARTI.  T2N3M0, stage IIIC.  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  Post op pt received adjuvant chemotherapy with ddAC-THP.  Radiation therapy with [facility].  She started letrozole 2.5mg daily which she continues to present time.\par \par She was clinically CHRIS until 5/19/21 when she went for screening mammo/US which showed an irregular focal asymmetry within the right upper outer quadrant which corresponds to irregular solid mass on concomitant US at right 10:00 10cmfn, biopsy rec, BIRADS 4C.\par \par Pt had a right breast 10:00 10cmfn US guided biopsy on 5/24/21 which showed invasive mammary carcinoma, mere score 6/9 (3=2+1), invasive tumor measures at least 0.3cm, no DCIS, ER 90%, IL 0%, HER2 negative 1+ IHC.\par \par Breast MRI on 5/27/2021 showed 0.9cm irregular mass in upper outer posterior right breast, corresponding to newly diagnosed malignancy; no evidence of multicentric or contralateral disease, BIRADS6.\par \par Pt underwent right breast mastectomy and prophylactic left breast mastectomy with Dr. Shadia Yuen on 6/15/2021.  Left breast showed mild-proliferative fibrocystic changes.  Right breast mastectomy showed 10:00 invasive ductal carcinoma 6mm, [place] score 6/9 grade II (3+2+1), negative margins, ER 90%, IL 0%, HER2 negative IHC 1+,  pT1b pNx\par \par Oncotype score 28. \par \par Risk factors:Prior breast disease: as above.  Menarche: 14.  Postmenopausal at age 51.  Age of first pregnancy 21, two children, two pregnancies, no breast feeding. \par OCP 10 years, no other HRT. The patient is not of Ashkenazi ethnic background.  Family history is significant for maternal cousin age 35 breast cancer. Father had prostate cancer age 80.  Daughter with melanoma age 40.  maternal gpa lung cancer age 65, smoker.  Maternal aunt stomach cancer age 82.  Paternal gma unknown cancer age 30.  No ovary, endometrium, pancreatic cancer. Genetic testing INV[facility]AE 4/28/2019 VUS MELVA, NBN, F1.

## 2021-07-15 NOTE — CONSULT LETTER
[Dear  ___] : Dear ~CHEO, [( Thank you for referring [unfilled] for consultation for _____ )] : Thank you for referring [unfilled] for consultation for [unfilled] [Please see my note below.] : Please see my note below. [Consult Closing:] : Thank you very much for allowing me to participate in the care of this patient.  If you have any questions, please do not hesitate to contact me. [Sincerely,] : Sincerely, [FreeTextEntry2] : Dr. Zunilda Yuen [FreeTextEntry3] : Prabha Berumen

## 2021-07-15 NOTE — PHYSICAL EXAM
[Fully active, able to carry on all pre-disease performance without restriction] : Status 0 - Fully active, able to carry on all pre-disease performance without restriction [Normal] : affect appropriate [de-identified] : bilateral mastectomiy scars healing well, no chest wall masses, no axillary LAD b/l

## 2021-07-15 NOTE — ASSESSMENT
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 She is here for initial consultation for newly diagnosed right invasive ductal carcinoma, 6mm, ER 90%, GA 0%, HER2 negative, pT1b pNx.  \par \par We discussed the role of chemotherapy, surgery and radiation in treating her breast cancer.  She is s/p right mastectomy and prophylactic left mastectomy.  We reviewed her oncotype score which was 28 and therefore I recommend adjuvant chemotherapy with TC chemotherapy.  The goal of treatment is curative.  \par \par We discussed the potential adverse effects including nausea, vomiting, alopecia, myelosuppression which can lead to increased risk of infection, mucositis, bleeding and need for transfusion support, liver and kidney damage, infusion reaction, neuropathy, fatigue, musculoskeletal pains, fluid retention, rash, ocular effects and the rare side effects of leukemia/lymphoma. Pt will receive premedications with an antiemetic regimen and dexamethasone to help with nausea and infusion reactions. We discussed the use of cold caps to prevent alopecia and arrangements are being made to use dignicap during her treatment course.\par \par I recommended to discontinue the letrozole and will resume endocrine therapy after chemotherapy treatment.\par \par Patient was given the time to ask questions which I answered to the best of my ability and to her apparent satisfaction.  I encouraged her to call me with any additional questions. \par \par

## 2021-07-15 NOTE — REVIEW OF SYSTEMS
[Patient Intake Form Reviewed] : Patient intake form was reviewed [FreeTextEntry2] : PCP Dr. Agnes Plunkett, last seen 7/2020. COVID vaccine 3/24 Pfizer [FreeTextEntry7] : Colonoscopy 9/2018 - 2 polyps, repeat in 3 years [FreeTextEntry8] : Dr. Gerard Tyson pap smear 2/2021, normal.  [FreeTextEntry9] : Osteoporosis, on prolia.

## 2021-07-21 DIAGNOSIS — Z90.10 ACQUIRED ABSENCE OF UNSPECIFIED BREAST AND NIPPLE: Chronic | ICD-10-CM

## 2021-07-22 ENCOUNTER — RESULT REVIEW (OUTPATIENT)
Age: 71
End: 2021-07-22

## 2021-07-22 ENCOUNTER — APPOINTMENT (OUTPATIENT)
Dept: HEMATOLOGY ONCOLOGY | Facility: CLINIC | Age: 71
End: 2021-07-22
Payer: COMMERCIAL

## 2021-07-22 ENCOUNTER — LABORATORY RESULT (OUTPATIENT)
Age: 71
End: 2021-07-22

## 2021-07-22 ENCOUNTER — APPOINTMENT (OUTPATIENT)
Dept: INFUSION THERAPY | Facility: HOSPITAL | Age: 71
End: 2021-07-22

## 2021-07-22 VITALS
BODY MASS INDEX: 25.72 KG/M2 | TEMPERATURE: 97.2 F | HEIGHT: 62 IN | DIASTOLIC BLOOD PRESSURE: 70 MMHG | HEART RATE: 89 BPM | WEIGHT: 139.77 LBS | OXYGEN SATURATION: 97 % | RESPIRATION RATE: 16 BRPM | SYSTOLIC BLOOD PRESSURE: 150 MMHG

## 2021-07-22 LAB
BASOPHILS # BLD AUTO: 0.02 K/UL — SIGNIFICANT CHANGE UP (ref 0–0.2)
BASOPHILS NFR BLD AUTO: 0.2 % — SIGNIFICANT CHANGE UP (ref 0–2)
EOSINOPHIL # BLD AUTO: 0.05 K/UL — SIGNIFICANT CHANGE UP (ref 0–0.5)
EOSINOPHIL NFR BLD AUTO: 0.4 % — SIGNIFICANT CHANGE UP (ref 0–6)
HCT VFR BLD CALC: 38.2 % — SIGNIFICANT CHANGE UP (ref 34.5–45)
HGB BLD-MCNC: 13 G/DL — SIGNIFICANT CHANGE UP (ref 11.5–15.5)
IMM GRANULOCYTES NFR BLD AUTO: 0.9 % — SIGNIFICANT CHANGE UP (ref 0–1.5)
LYMPHOCYTES # BLD AUTO: 1.17 K/UL — SIGNIFICANT CHANGE UP (ref 1–3.3)
LYMPHOCYTES # BLD AUTO: 9.3 % — LOW (ref 13–44)
MCHC RBC-ENTMCNC: 30 PG — SIGNIFICANT CHANGE UP (ref 27–34)
MCHC RBC-ENTMCNC: 34 G/DL — SIGNIFICANT CHANGE UP (ref 32–36)
MCV RBC AUTO: 88.2 FL — SIGNIFICANT CHANGE UP (ref 80–100)
MONOCYTES # BLD AUTO: 0.22 K/UL — SIGNIFICANT CHANGE UP (ref 0–0.9)
MONOCYTES NFR BLD AUTO: 1.8 % — LOW (ref 2–14)
NEUTROPHILS # BLD AUTO: 10.97 K/UL — HIGH (ref 1.8–7.4)
NEUTROPHILS NFR BLD AUTO: 87.4 % — HIGH (ref 43–77)
NRBC # BLD: 0 /100 WBCS — SIGNIFICANT CHANGE UP (ref 0–0)
PLATELET # BLD AUTO: 334 K/UL — SIGNIFICANT CHANGE UP (ref 150–400)
RBC # BLD: 4.33 M/UL — SIGNIFICANT CHANGE UP (ref 3.8–5.2)
RBC # FLD: 13.2 % — SIGNIFICANT CHANGE UP (ref 10.3–14.5)
WBC # BLD: 12.54 K/UL — HIGH (ref 3.8–10.5)
WBC # FLD AUTO: 12.54 K/UL — HIGH (ref 3.8–10.5)

## 2021-07-22 PROCEDURE — 99215 OFFICE O/P EST HI 40 MIN: CPT

## 2021-07-22 PROCEDURE — 99072 ADDL SUPL MATRL&STAF TM PHE: CPT

## 2021-07-22 NOTE — ASSESSMENT
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  Now with newly diagnosed right invasive ductal carcinoma, 6mm, ER 90%, AZ 0%, HER2 negative, pT1b pNx.  s/p right breast mastectomy and prophylactic left breast mastectomy.  Oncotype 28.  \par \par -pt is here to start adjuvant chemotherapy with TC chemotherapy.  C1 today. \par -pt will see me every 3 weeks prior to treatment.\par -the goal of treatment is curative.  \par -we again reviewed the potential adverse effects \par -pt will receive premedications with an antiemetic regimen and dexamethasone to help with nausea and infusion reactions. \par -supportive meds sent to pharmacy - reglan, emla cream and claritin\par -if pt has fever of 100.4F or concerning symptoms, rec to call the office right away or go to the ER.\par -pt will use dignicap during her treatment to help with alopecia.\par -discontinue letrozole and will resume endocrine therapy after chemotherapy treatment.\par - I encouraged her to call me with any additional questions.

## 2021-07-22 NOTE — HISTORY OF PRESENT ILLNESS
[de-identified] : Pt initially presented at age 63 in 2/2014 with right breast lump. Bilateral mammogram showed focal asymmetry in right lower outer quadrant with US demonstrating irregular mass measuring 2.8cm.  She had a right lumpectomy 4/2014 and ALND by Dr. Zunilda Yuen which showed a 2.6cm invasive ductal carcinoma grade 2 with LVI and PNI.  10/20 right axillary LN were positive for malignancy, +MARTI.  T2N3M0, stage IIIC.  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  Post op pt received adjuvant chemotherapy with ddAC-THP.  Radiation therapy with [facility].  She started letrozole 2.5mg daily which she continues to present time.\par \par She was clinically CHRIS until 5/19/21 when she went for screening mammo/US which showed an irregular focal asymmetry within the right upper outer quadrant which corresponds to irregular solid mass on concomitant US at right 10:00 10cmfn, biopsy rec, BIRADS 4C.\par \par Pt had a right breast 10:00 10cmfn US guided biopsy on 5/24/21 which showed invasive mammary carcinoma, mere score 6/9 (3=2+1), invasive tumor measures at least 0.3cm, no DCIS, ER 90%, CT 0%, HER2 negative 1+ IHC.\par \par Breast MRI on 5/27/2021 showed 0.9cm irregular mass in upper outer posterior right breast, corresponding to newly diagnosed malignancy; no evidence of multicentric or contralateral disease, BIRADS6.\par \par Pt underwent right breast mastectomy and prophylactic left breast mastectomy with Dr. Zunilda Yuen on 6/15/2021.  Left breast showed mild-proliferative fibrocystic changes.  Right breast mastectomy showed 10:00 invasive ductal carcinoma 6mm, [place] score 6/9 grade II (3+2+1), negative margins, ER 90%, CT 0%, HER2 negative IHC 1+,  pT1b pNx\par \par Oncotype score 28. \par \par Risk factors:Prior breast disease: as above.  Menarche: 14.  Postmenopausal at age 51.  Age of first pregnancy 21, two children, two pregnancies, no breast feeding. \par OCP 10 years, no other HRT. The patient is not of Ashkenazi ethnic background.  Family history is significant for maternal cousin age 35 breast cancer. Father had prostate cancer age 80.  Daughter with melanoma age 40.  maternal gpa lung cancer age 65, smoker.  Maternal aunt stomach cancer age 82.  Paternal gma unknown cancer age 30.  No ovary, endometrium, pancreatic cancer. Genetic testing [facility] 4/28/2019 VUS MELVA, NBN, F1.   [de-identified] : \par Here for C1 of TC today. \par No new complaints. Pt feels well.  [Date: ____________] : Patient's last distress assessment performed on [unfilled]. [1 - Distress Level] : Distress Level: 1 [Patient given social work contact information and resource sheet] : Patient was given social work contact information and resource sheet

## 2021-07-22 NOTE — PHYSICAL EXAM
[Fully active, able to carry on all pre-disease performance without restriction] : Status 0 - Fully active, able to carry on all pre-disease performance without restriction [Normal] : affect appropriate [de-identified] : bilateral mastectomiy scars healing well, no chest wall masses, no axillary LAD b/l

## 2021-07-23 DIAGNOSIS — R11.2 NAUSEA WITH VOMITING, UNSPECIFIED: ICD-10-CM

## 2021-07-23 DIAGNOSIS — Z51.11 ENCOUNTER FOR ANTINEOPLASTIC CHEMOTHERAPY: ICD-10-CM

## 2021-08-04 PROBLEM — M81.0 AGE-RELATED OSTEOPOROSIS WITHOUT CURRENT PATHOLOGICAL FRACTURE: Chronic | Status: ACTIVE | Noted: 2021-07-21

## 2021-08-10 ENCOUNTER — APPOINTMENT (OUTPATIENT)
Dept: HEMATOLOGY ONCOLOGY | Facility: CLINIC | Age: 71
End: 2021-08-10
Payer: COMMERCIAL

## 2021-08-10 PROCEDURE — 99212 OFFICE O/P EST SF 10 MIN: CPT

## 2021-08-11 ENCOUNTER — OUTPATIENT (OUTPATIENT)
Dept: OUTPATIENT SERVICES | Facility: HOSPITAL | Age: 71
LOS: 1 days | Discharge: HOME | End: 2021-08-11

## 2021-08-11 DIAGNOSIS — E89.0 POSTPROCEDURAL HYPOTHYROIDISM: Chronic | ICD-10-CM

## 2021-08-11 DIAGNOSIS — N60.02 SOLITARY CYST OF LEFT BREAST: Chronic | ICD-10-CM

## 2021-08-11 DIAGNOSIS — Z90.10 ACQUIRED ABSENCE OF UNSPECIFIED BREAST AND NIPPLE: Chronic | ICD-10-CM

## 2021-08-11 DIAGNOSIS — Z98.890 OTHER SPECIFIED POSTPROCEDURAL STATES: Chronic | ICD-10-CM

## 2021-08-11 DIAGNOSIS — C50.919 MALIGNANT NEOPLASM OF UNSPECIFIED SITE OF UNSPECIFIED FEMALE BREAST: ICD-10-CM

## 2021-08-12 ENCOUNTER — APPOINTMENT (OUTPATIENT)
Dept: INFUSION THERAPY | Facility: HOSPITAL | Age: 71
End: 2021-08-12

## 2021-08-12 ENCOUNTER — NON-APPOINTMENT (OUTPATIENT)
Age: 71
End: 2021-08-12

## 2021-08-12 ENCOUNTER — LABORATORY RESULT (OUTPATIENT)
Age: 71
End: 2021-08-12

## 2021-08-12 ENCOUNTER — APPOINTMENT (OUTPATIENT)
Dept: HEMATOLOGY ONCOLOGY | Facility: CLINIC | Age: 71
End: 2021-08-12
Payer: COMMERCIAL

## 2021-08-12 ENCOUNTER — RESULT REVIEW (OUTPATIENT)
Age: 71
End: 2021-08-12

## 2021-08-12 LAB
BASOPHILS # BLD AUTO: 0.03 K/UL — SIGNIFICANT CHANGE UP (ref 0–0.2)
BASOPHILS NFR BLD AUTO: 0.2 % — SIGNIFICANT CHANGE UP (ref 0–2)
EOSINOPHIL # BLD AUTO: 0 K/UL — SIGNIFICANT CHANGE UP (ref 0–0.5)
EOSINOPHIL NFR BLD AUTO: 0 % — SIGNIFICANT CHANGE UP (ref 0–6)
HCT VFR BLD CALC: 34.6 % — SIGNIFICANT CHANGE UP (ref 34.5–45)
HGB BLD-MCNC: 11.9 G/DL — SIGNIFICANT CHANGE UP (ref 11.5–15.5)
IMM GRANULOCYTES NFR BLD AUTO: 0.8 % — SIGNIFICANT CHANGE UP (ref 0–1.5)
LYMPHOCYTES # BLD AUTO: 0.92 K/UL — LOW (ref 1–3.3)
LYMPHOCYTES # BLD AUTO: 6.5 % — LOW (ref 13–44)
MCHC RBC-ENTMCNC: 30.5 PG — SIGNIFICANT CHANGE UP (ref 27–34)
MCHC RBC-ENTMCNC: 34.4 G/DL — SIGNIFICANT CHANGE UP (ref 32–36)
MCV RBC AUTO: 88.7 FL — SIGNIFICANT CHANGE UP (ref 80–100)
MONOCYTES # BLD AUTO: 0.33 K/UL — SIGNIFICANT CHANGE UP (ref 0–0.9)
MONOCYTES NFR BLD AUTO: 2.3 % — SIGNIFICANT CHANGE UP (ref 2–14)
NEUTROPHILS # BLD AUTO: 12.72 K/UL — HIGH (ref 1.8–7.4)
NEUTROPHILS NFR BLD AUTO: 90.2 % — HIGH (ref 43–77)
NRBC # BLD: 0 /100 WBCS — SIGNIFICANT CHANGE UP (ref 0–0)
PLATELET # BLD AUTO: 548 K/UL — HIGH (ref 150–400)
RBC # BLD: 3.9 M/UL — SIGNIFICANT CHANGE UP (ref 3.8–5.2)
RBC # FLD: 14 % — SIGNIFICANT CHANGE UP (ref 10.3–14.5)
WBC # BLD: 14.11 K/UL — HIGH (ref 3.8–10.5)
WBC # FLD AUTO: 14.11 K/UL — HIGH (ref 3.8–10.5)

## 2021-08-12 PROCEDURE — 99215 OFFICE O/P EST HI 40 MIN: CPT

## 2021-08-12 NOTE — ASSESSMENT
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  Now with newly diagnosed right invasive ductal carcinoma, 6mm, ER 90%, MS 0%, HER2 negative, pT1b pNx.  s/p right breast mastectomy and prophylactic left breast mastectomy.  Oncotype 28.  Pt is on adjuvant chemotherapy with TC chemotherapy.  \par \par -C2 today.  Tolerating well. \par -pt will see me every 3 weeks prior to treatment.\par -c/w antiemetic regimen and dexamethasone to help with nausea and infusion reactions. \par -supportive meds sent to pharmacy - reglan, emla cream and claritin\par -if pt has fever of 100.4F or concerning symptoms, rec to call the office right away or go to the ER.\par -pt will use dignicap during her treatment to help with alopecia.\par -discontinue letrozole and will resume endocrine therapy after chemotherapy treatment.\par -I encouraged her to call me with any additional questions.

## 2021-08-12 NOTE — HISTORY OF PRESENT ILLNESS
[Patient given social work contact information and resource sheet] : Patient was given social work contact information and resource sheet [de-identified] : Pt initially presented at age 63 in 2/2014 with right breast lump. Bilateral mammogram showed focal asymmetry in right lower outer quadrant with US demonstrating irregular mass measuring 2.8cm.  She had a right lumpectomy 4/2014 and ALND by Dr. Zunilda Yuen which showed a 2.6cm invasive ductal carcinoma grade 2 with LVI and PNI.  10/20 right axillary LN were positive for malignancy, +MARTI.  T2N3M0, stage IIIC.  ER 90%, MT 60%, HER2 negative IHC 0 staining however CISH was amplified (2.4).  Post op pt received adjuvant chemotherapy with ddAC-THP.  Radiation therapy with [facility].  She started letrozole 2.5mg daily which she continues to present time.\par \par She was clinically CHRIS until 5/19/21 when she went for screening mammo/US which showed an irregular focal asymmetry within the right upper outer quadrant which corresponds to irregular solid mass on concomitant US at right 10:00 10cmfn, biopsy rec, BIRADS 4C.\par \par Pt had a right breast 10:00 10cmfn US guided biopsy on 5/24/21 which showed invasive mammary carcinoma, mere score 6/9 (3=2+1), invasive tumor measures at least 0.3cm, no DCIS, ER 90%, MT 0%, HER2 negative 1+ IHC.\par \par Breast MRI on 5/27/2021 showed 0.9cm irregular mass in upper outer posterior right breast, corresponding to newly diagnosed malignancy; no evidence of multicentric or contralateral disease, BIRADS6.\par \par Pt underwent right breast mastectomy and prophylactic left breast mastectomy with Dr. Zunilda Yuen on 6/15/2021.  Left breast showed mild-proliferative fibrocystic changes.  Right breast mastectomy showed 10:00 invasive ductal carcinoma 6mm, Mere score 6/9 grade II (3+2+1), negative margins, ER 90%, MT 0%, HER2 negative IHC 1+,  pT1b pNx\par \par Oncotype score 28. \par \par Risk factors:Prior breast disease: as above.  Menarche: 14.  Postmenopausal at age 51.  Age of first pregnancy 21, two children, two pregnancies, no breast feeding. \par OCP 10 years, no other HRT. The patient is not of Ashkenazi ethnic background.  Family history is significant for maternal cousin age 35 breast cancer. Father had prostate cancer age 80.  Daughter with melanoma age 40.  maternal gpa lung cancer age 65, smoker.  Maternal aunt stomach cancer age 82.  Paternal gma unknown cancer age 30.  No ovary, endometrium, pancreatic cancer. Genetic testing [facility] 4/28/2019 VUS MELVA, NBN, F1.   [FreeTextEntry1] : C1 TC 7/22/21 [de-identified] : \par Here for C2 of TC today. \par Chemotherapy induced fatigue D4-D10, not interfering with ADLs\par Neulasta induced bone pain mostly in legs: claritin daily helping. \par Taste changes: trying different foods. Spoke with nutritionist, ensure prn. Breakfast is tough due to taste 2/2 to breads. \par Continued to work from home during first cycle\par Had facial erythematous self resolved in a couple days, moisturizers prn\par No headaches, blurry vision, chest pain, SOB, cough, LE swelling, hematuria.\par No fevers. \par No abd pain, n/v/d.

## 2021-08-12 NOTE — PHYSICAL EXAM
[Fully active, able to carry on all pre-disease performance without restriction] : Status 0 - Fully active, able to carry on all pre-disease performance without restriction [Normal] : affect appropriate [de-identified] : bilateral mastectomiy scars healing well, no chest wall masses, no axillary LAD b/l

## 2021-08-13 ENCOUNTER — NON-APPOINTMENT (OUTPATIENT)
Age: 71
End: 2021-08-13

## 2021-08-13 DIAGNOSIS — R11.2 NAUSEA WITH VOMITING, UNSPECIFIED: ICD-10-CM

## 2021-08-13 DIAGNOSIS — Z51.11 ENCOUNTER FOR ANTINEOPLASTIC CHEMOTHERAPY: ICD-10-CM

## 2021-09-02 ENCOUNTER — RESULT REVIEW (OUTPATIENT)
Age: 71
End: 2021-09-02

## 2021-09-02 ENCOUNTER — APPOINTMENT (OUTPATIENT)
Dept: INFUSION THERAPY | Facility: HOSPITAL | Age: 71
End: 2021-09-02

## 2021-09-02 ENCOUNTER — APPOINTMENT (OUTPATIENT)
Dept: HEMATOLOGY ONCOLOGY | Facility: CLINIC | Age: 71
End: 2021-09-02
Payer: COMMERCIAL

## 2021-09-02 ENCOUNTER — LABORATORY RESULT (OUTPATIENT)
Age: 71
End: 2021-09-02

## 2021-09-02 VITALS
BODY MASS INDEX: 24.22 KG/M2 | WEIGHT: 131.59 LBS | TEMPERATURE: 97 F | OXYGEN SATURATION: 99 % | HEART RATE: 108 BPM | DIASTOLIC BLOOD PRESSURE: 77 MMHG | RESPIRATION RATE: 16 BRPM | SYSTOLIC BLOOD PRESSURE: 114 MMHG | HEIGHT: 62 IN

## 2021-09-02 DIAGNOSIS — R12 HEARTBURN: ICD-10-CM

## 2021-09-02 LAB
BASOPHILS # BLD AUTO: 0.02 K/UL — SIGNIFICANT CHANGE UP (ref 0–0.2)
BASOPHILS NFR BLD AUTO: 0.3 % — SIGNIFICANT CHANGE UP (ref 0–2)
EOSINOPHIL # BLD AUTO: 0 K/UL — SIGNIFICANT CHANGE UP (ref 0–0.5)
EOSINOPHIL NFR BLD AUTO: 0 % — SIGNIFICANT CHANGE UP (ref 0–6)
HCT VFR BLD CALC: 32.3 % — LOW (ref 34.5–45)
HGB BLD-MCNC: 10.9 G/DL — LOW (ref 11.5–15.5)
IMM GRANULOCYTES NFR BLD AUTO: 0.4 % — SIGNIFICANT CHANGE UP (ref 0–1.5)
LYMPHOCYTES # BLD AUTO: 0.82 K/UL — LOW (ref 1–3.3)
LYMPHOCYTES # BLD AUTO: 11.8 % — LOW (ref 13–44)
MCHC RBC-ENTMCNC: 30.4 PG — SIGNIFICANT CHANGE UP (ref 27–34)
MCHC RBC-ENTMCNC: 33.7 G/DL — SIGNIFICANT CHANGE UP (ref 32–36)
MCV RBC AUTO: 90.2 FL — SIGNIFICANT CHANGE UP (ref 80–100)
MONOCYTES # BLD AUTO: 0.13 K/UL — SIGNIFICANT CHANGE UP (ref 0–0.9)
MONOCYTES NFR BLD AUTO: 1.9 % — LOW (ref 2–14)
NEUTROPHILS # BLD AUTO: 5.92 K/UL — SIGNIFICANT CHANGE UP (ref 1.8–7.4)
NEUTROPHILS NFR BLD AUTO: 85.6 % — HIGH (ref 43–77)
NRBC # BLD: 0 /100 WBCS — SIGNIFICANT CHANGE UP (ref 0–0)
PLATELET # BLD AUTO: 441 K/UL — HIGH (ref 150–400)
RBC # BLD: 3.58 M/UL — LOW (ref 3.8–5.2)
RBC # FLD: 15.3 % — HIGH (ref 10.3–14.5)
WBC # BLD: 6.92 K/UL — SIGNIFICANT CHANGE UP (ref 3.8–10.5)
WBC # FLD AUTO: 6.92 K/UL — SIGNIFICANT CHANGE UP (ref 3.8–10.5)

## 2021-09-02 PROCEDURE — 99214 OFFICE O/P EST MOD 30 MIN: CPT

## 2021-09-02 NOTE — PHYSICAL EXAM
[Fully active, able to carry on all pre-disease performance without restriction] : Status 0 - Fully active, able to carry on all pre-disease performance without restriction [Normal] : affect appropriate [de-identified] : bilateral mastectomiy scars healing well, no chest wall masses, no axillary LAD b/l

## 2021-09-02 NOTE — ASSESSMENT
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  Now with newly diagnosed right invasive ductal carcinoma, 6mm, ER 90%, AL 0%, HER2 negative, pT1b pNx.  s/p right breast mastectomy and prophylactic left breast mastectomy.  Oncotype 28.  Pt is on adjuvant chemotherapy with TC chemotherapy.  \par \par -C3 today.  Tolerating well. \par -pt will see me every 3 weeks prior to treatment.\par -c/w antiemetic regimen and dexamethasone to help with nausea and infusion reactions.\par -reflux continue omeprazole \par -if pt has fever of 100.4F or concerning symptoms, rec to call the office right away or go to the ER.\par -pt will use dignicap during her treatment to help with alopecia.\par -discontinue letrozole and will resume endocrine therapy after chemotherapy treatment.\par -I encouraged her to call me with any additional questions.

## 2021-09-02 NOTE — END OF VISIT
[FreeTextEntry3] : Patient being seen as per physician's primary plan of care.\par Case d/w Dr. Berumen \par

## 2021-09-02 NOTE — REASON FOR VISIT
[Pre-Treatment Visit] : a pre-treatment [Initial Consultation] : an initial consultation [Other: _____] : [unfilled] [FreeTextEntry2] : breast cancer

## 2021-09-02 NOTE — HISTORY OF PRESENT ILLNESS
[Patient given social work contact information and resource sheet] : Patient was given social work contact information and resource sheet [de-identified] : Pt initially presented at age 63 in 2/2014 with right breast lump. Bilateral mammogram showed focal asymmetry in right lower outer quadrant with US demonstrating irregular mass measuring 2.8cm.  She had a right lumpectomy 4/2014 and ALND by Dr. Zunilda Yuen which showed a 2.6cm invasive ductal carcinoma grade 2 with LVI and PNI.  10/20 right axillary LN were positive for malignancy, +MARTI.  T2N3M0, stage IIIC.  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  Post op pt received adjuvant chemotherapy with ddAC-THP.  Radiation therapy with [facility].  She started letrozole 2.5mg daily which she continues to present time.\par \par She was clinically CHRIS until 5/19/21 when she went for screening mammo/US which showed an irregular focal asymmetry within the right upper outer quadrant which corresponds to irregular solid mass on concomitant US at right 10:00 10cmfn, biopsy rec, BIRADS 4C.\par \par Pt had a right breast 10:00 10cmfn US guided biopsy on 5/24/21 which showed invasive mammary carcinoma, mere score 6/9 (3=2+1), invasive tumor measures at least 0.3cm, no DCIS, ER 90%, KY 0%, HER2 negative 1+ IHC.\par \par Breast MRI on 5/27/2021 showed 0.9cm irregular mass in upper outer posterior right breast, corresponding to newly diagnosed malignancy; no evidence of multicentric or contralateral disease, BIRADS6.\par \par Pt underwent right breast mastectomy and prophylactic left breast mastectomy with Dr. Zunilda Yuen on 6/15/2021.  Left breast showed mild-proliferative fibrocystic changes.  Right breast mastectomy showed 10:00 invasive ductal carcinoma 6mm, Mere score 6/9 grade II (3+2+1), negative margins, ER 90%, KY 0%, HER2 negative IHC 1+,  pT1b pNx\par \par Oncotype score 28. \par \par Risk factors:Prior breast disease: as above.  Menarche: 14.  Postmenopausal at age 51.  Age of first pregnancy 21, two children, two pregnancies, no breast feeding. \par OCP 10 years, no other HRT. The patient is not of Ashkenazi ethnic background.  Family history is significant for maternal cousin age 35 breast cancer. Father had prostate cancer age 80.  Daughter with melanoma age 40.  maternal gpa lung cancer age 65, smoker.  Maternal aunt stomach cancer age 82.  Paternal gma unknown cancer age 30.  No ovary, endometrium, pancreatic cancer. Genetic testing [facility] 4/28/2019 VUS MELVA, NBN, F1.   [FreeTextEntry1] : C1 TC 7/22/21 [de-identified] : \par Here for C3 of TC today. \par Chemotherapy induced fatigue D4-D10, not interfering with ADLs\par Neulasta induced bone pain did not recur after first cycle. \par Taste changes: trying different foods. Spoke with nutritionist, ensure prn. \par No headaches, blurry vision, chest pain, SOB, cough, LE swelling, hematuria.\par No fevers, chills,  abd pain, n/v/d.  \par Reflux well controlled w/ omeprazole. \par Using Dignicap during treatment - some mild hair loss noted.

## 2021-09-02 NOTE — REVIEW OF SYSTEMS
[Patient Intake Form Reviewed] : Patient intake form was reviewed [Abdominal Pain] : no abdominal pain [Vomiting] : no vomiting [Constipation] : no constipation [Diarrhea] : no diarrhea [FreeTextEntry2] : PCP Dr. Agnes Plunkett, last seen 7/2020. COVID vaccine 3/24 Pfizer [FreeTextEntry7] : + heartburn [FreeTextEntry8] : Dr. Gerard Tyson pap smear 2/2021, normal.  [FreeTextEntry9] : Osteoporosis, on prolia.

## 2021-09-03 DIAGNOSIS — Z51.89 ENCOUNTER FOR OTHER SPECIFIED AFTERCARE: ICD-10-CM

## 2021-09-08 ENCOUNTER — NON-APPOINTMENT (OUTPATIENT)
Age: 71
End: 2021-09-08

## 2021-09-10 ENCOUNTER — NON-APPOINTMENT (OUTPATIENT)
Age: 71
End: 2021-09-10

## 2021-09-11 ENCOUNTER — INPATIENT (INPATIENT)
Facility: HOSPITAL | Age: 71
LOS: 1 days | Discharge: HOME | DRG: 394 | End: 2021-09-13
Attending: INTERNAL MEDICINE | Admitting: INTERNAL MEDICINE
Payer: COMMERCIAL

## 2021-09-11 VITALS
HEART RATE: 104 BPM | HEIGHT: 62 IN | DIASTOLIC BLOOD PRESSURE: 77 MMHG | RESPIRATION RATE: 18 BRPM | SYSTOLIC BLOOD PRESSURE: 125 MMHG | TEMPERATURE: 98 F | OXYGEN SATURATION: 97 % | WEIGHT: 128.09 LBS

## 2021-09-11 DIAGNOSIS — I10 ESSENTIAL (PRIMARY) HYPERTENSION: ICD-10-CM

## 2021-09-11 DIAGNOSIS — R19.7 DIARRHEA, UNSPECIFIED: ICD-10-CM

## 2021-09-11 DIAGNOSIS — Z90.10 ACQUIRED ABSENCE OF UNSPECIFIED BREAST AND NIPPLE: Chronic | ICD-10-CM

## 2021-09-11 DIAGNOSIS — N17.9 ACUTE KIDNEY FAILURE, UNSPECIFIED: ICD-10-CM

## 2021-09-11 DIAGNOSIS — N60.02 SOLITARY CYST OF LEFT BREAST: Chronic | ICD-10-CM

## 2021-09-11 DIAGNOSIS — C50.919 MALIGNANT NEOPLASM OF UNSPECIFIED SITE OF UNSPECIFIED FEMALE BREAST: ICD-10-CM

## 2021-09-11 DIAGNOSIS — E89.0 POSTPROCEDURAL HYPOTHYROIDISM: Chronic | ICD-10-CM

## 2021-09-11 DIAGNOSIS — D72.829 ELEVATED WHITE BLOOD CELL COUNT, UNSPECIFIED: ICD-10-CM

## 2021-09-11 DIAGNOSIS — Z29.9 ENCOUNTER FOR PROPHYLACTIC MEASURES, UNSPECIFIED: ICD-10-CM

## 2021-09-11 DIAGNOSIS — Z98.890 OTHER SPECIFIED POSTPROCEDURAL STATES: Chronic | ICD-10-CM

## 2021-09-11 DIAGNOSIS — K92.1 MELENA: ICD-10-CM

## 2021-09-11 DIAGNOSIS — N39.0 URINARY TRACT INFECTION, SITE NOT SPECIFIED: ICD-10-CM

## 2021-09-11 LAB
ALBUMIN SERPL ELPH-MCNC: 3.2 G/DL — LOW (ref 3.3–5)
ALBUMIN SERPL ELPH-MCNC: 3.8 G/DL — SIGNIFICANT CHANGE UP (ref 3.3–5)
ALP SERPL-CCNC: 102 U/L — SIGNIFICANT CHANGE UP (ref 40–120)
ALP SERPL-CCNC: 138 U/L — HIGH (ref 40–120)
ALT FLD-CCNC: 12 U/L — SIGNIFICANT CHANGE UP (ref 10–45)
ALT FLD-CCNC: 15 U/L — SIGNIFICANT CHANGE UP (ref 10–45)
ANION GAP SERPL CALC-SCNC: 13 MMOL/L — SIGNIFICANT CHANGE UP (ref 5–17)
ANION GAP SERPL CALC-SCNC: 16 MMOL/L — SIGNIFICANT CHANGE UP (ref 5–17)
ANISOCYTOSIS BLD QL: SLIGHT — SIGNIFICANT CHANGE UP
APPEARANCE UR: CLEAR — SIGNIFICANT CHANGE UP
APTT BLD: 26.8 SEC — LOW (ref 27.5–35.5)
AST SERPL-CCNC: 23 U/L — SIGNIFICANT CHANGE UP (ref 10–40)
AST SERPL-CCNC: 27 U/L — SIGNIFICANT CHANGE UP (ref 10–40)
BACTERIA # UR AUTO: ABNORMAL
BASOPHILS # BLD AUTO: 0 K/UL — SIGNIFICANT CHANGE UP (ref 0–0.2)
BASOPHILS # BLD AUTO: 0 K/UL — SIGNIFICANT CHANGE UP (ref 0–0.2)
BASOPHILS NFR BLD AUTO: 0 % — SIGNIFICANT CHANGE UP (ref 0–2)
BASOPHILS NFR BLD AUTO: 0 % — SIGNIFICANT CHANGE UP (ref 0–2)
BILIRUB SERPL-MCNC: 0.2 MG/DL — SIGNIFICANT CHANGE UP (ref 0.2–1.2)
BILIRUB SERPL-MCNC: 0.2 MG/DL — SIGNIFICANT CHANGE UP (ref 0.2–1.2)
BILIRUB UR-MCNC: NEGATIVE — SIGNIFICANT CHANGE UP
BLD GP AB SCN SERPL QL: NEGATIVE — SIGNIFICANT CHANGE UP
BUN SERPL-MCNC: 16 MG/DL — SIGNIFICANT CHANGE UP (ref 7–23)
BUN SERPL-MCNC: 20 MG/DL — SIGNIFICANT CHANGE UP (ref 7–23)
CALCIUM SERPL-MCNC: 8.5 MG/DL — SIGNIFICANT CHANGE UP (ref 8.4–10.5)
CALCIUM SERPL-MCNC: 9.7 MG/DL — SIGNIFICANT CHANGE UP (ref 8.4–10.5)
CHLORIDE SERPL-SCNC: 104 MMOL/L — SIGNIFICANT CHANGE UP (ref 96–108)
CHLORIDE SERPL-SCNC: 98 MMOL/L — SIGNIFICANT CHANGE UP (ref 96–108)
CO2 SERPL-SCNC: 23 MMOL/L — SIGNIFICANT CHANGE UP (ref 22–31)
CO2 SERPL-SCNC: 25 MMOL/L — SIGNIFICANT CHANGE UP (ref 22–31)
COLOR SPEC: SIGNIFICANT CHANGE UP
CREAT SERPL-MCNC: 2.13 MG/DL — HIGH (ref 0.5–1.3)
CREAT SERPL-MCNC: 2.4 MG/DL — HIGH (ref 0.5–1.3)
CULTURE RESULTS: SIGNIFICANT CHANGE UP
DACRYOCYTES BLD QL SMEAR: SLIGHT — SIGNIFICANT CHANGE UP
DIFF PNL FLD: NEGATIVE — SIGNIFICANT CHANGE UP
ELLIPTOCYTES BLD QL SMEAR: SLIGHT — SIGNIFICANT CHANGE UP
EOSINOPHIL # BLD AUTO: 0 K/UL — SIGNIFICANT CHANGE UP (ref 0–0.5)
EOSINOPHIL # BLD AUTO: 0 K/UL — SIGNIFICANT CHANGE UP (ref 0–0.5)
EOSINOPHIL NFR BLD AUTO: 0 % — SIGNIFICANT CHANGE UP (ref 0–6)
EOSINOPHIL NFR BLD AUTO: 0 % — SIGNIFICANT CHANGE UP (ref 0–6)
EPI CELLS # UR: 0 /HPF — SIGNIFICANT CHANGE UP
GIANT PLATELETS BLD QL SMEAR: PRESENT — SIGNIFICANT CHANGE UP
GLUCOSE SERPL-MCNC: 106 MG/DL — HIGH (ref 70–99)
GLUCOSE SERPL-MCNC: 116 MG/DL — HIGH (ref 70–99)
GLUCOSE UR QL: NEGATIVE — SIGNIFICANT CHANGE UP
HCT VFR BLD CALC: 27.9 % — LOW (ref 34.5–45)
HCT VFR BLD CALC: 30.5 % — LOW (ref 34.5–45)
HGB BLD-MCNC: 10 G/DL — LOW (ref 11.5–15.5)
HGB BLD-MCNC: 9.1 G/DL — LOW (ref 11.5–15.5)
HYALINE CASTS # UR AUTO: 2 /LPF — SIGNIFICANT CHANGE UP (ref 0–2)
INR BLD: 1.04 RATIO — SIGNIFICANT CHANGE UP (ref 0.88–1.16)
KETONES UR-MCNC: NEGATIVE — SIGNIFICANT CHANGE UP
LEUKOCYTE ESTERASE UR-ACNC: ABNORMAL
LYMPHOCYTES # BLD AUTO: 1.72 K/UL — SIGNIFICANT CHANGE UP (ref 1–3.3)
LYMPHOCYTES # BLD AUTO: 1.87 K/UL — SIGNIFICANT CHANGE UP (ref 1–3.3)
LYMPHOCYTES # BLD AUTO: 3.3 % — LOW (ref 13–44)
LYMPHOCYTES # BLD AUTO: 3.4 % — LOW (ref 13–44)
MACROCYTES BLD QL: SLIGHT — SIGNIFICANT CHANGE UP
MAGNESIUM SERPL-MCNC: 1.6 MG/DL — SIGNIFICANT CHANGE UP (ref 1.6–2.6)
MANUAL SMEAR VERIFICATION: SIGNIFICANT CHANGE UP
MCHC RBC-ENTMCNC: 29.8 PG — SIGNIFICANT CHANGE UP (ref 27–34)
MCHC RBC-ENTMCNC: 29.9 PG — SIGNIFICANT CHANGE UP (ref 27–34)
MCHC RBC-ENTMCNC: 32.6 GM/DL — SIGNIFICANT CHANGE UP (ref 32–36)
MCHC RBC-ENTMCNC: 32.8 GM/DL — SIGNIFICANT CHANGE UP (ref 32–36)
MCV RBC AUTO: 91 FL — SIGNIFICANT CHANGE UP (ref 80–100)
MCV RBC AUTO: 91.5 FL — SIGNIFICANT CHANGE UP (ref 80–100)
METAMYELOCYTES # FLD: 2.5 % — HIGH (ref 0–0)
MICROCYTES BLD QL: SLIGHT — SIGNIFICANT CHANGE UP
MONOCYTES # BLD AUTO: 2.62 K/UL — HIGH (ref 0–0.9)
MONOCYTES # BLD AUTO: 2.78 K/UL — HIGH (ref 0–0.9)
MONOCYTES NFR BLD AUTO: 4.9 % — SIGNIFICANT CHANGE UP (ref 2–14)
MONOCYTES NFR BLD AUTO: 5.2 % — SIGNIFICANT CHANGE UP (ref 2–14)
MYELOCYTES NFR BLD: 8.3 % — HIGH (ref 0–0)
NEUTROPHILS # BLD AUTO: 42.59 K/UL — HIGH (ref 1.8–7.4)
NEUTROPHILS # BLD AUTO: 43.05 K/UL — HIGH (ref 1.8–7.4)
NEUTROPHILS NFR BLD AUTO: 65.3 % — SIGNIFICANT CHANGE UP (ref 43–77)
NEUTROPHILS NFR BLD AUTO: 81 % — HIGH (ref 43–77)
NEUTS BAND # BLD: 9.9 % — HIGH (ref 0–8)
NITRITE UR-MCNC: POSITIVE
PH UR: 6.5 — SIGNIFICANT CHANGE UP (ref 5–8)
PLAT MORPH BLD: ABNORMAL
PLATELET # BLD AUTO: 232 K/UL — SIGNIFICANT CHANGE UP (ref 150–400)
PLATELET # BLD AUTO: 259 K/UL — SIGNIFICANT CHANGE UP (ref 150–400)
POIKILOCYTOSIS BLD QL AUTO: SLIGHT — SIGNIFICANT CHANGE UP
POLYCHROMASIA BLD QL SMEAR: SLIGHT — SIGNIFICANT CHANGE UP
POTASSIUM SERPL-MCNC: 3.2 MMOL/L — LOW (ref 3.5–5.3)
POTASSIUM SERPL-MCNC: 3.4 MMOL/L — LOW (ref 3.5–5.3)
POTASSIUM SERPL-SCNC: 3.2 MMOL/L — LOW (ref 3.5–5.3)
POTASSIUM SERPL-SCNC: 3.4 MMOL/L — LOW (ref 3.5–5.3)
PROMYELOCYTES # FLD: 5.8 % — HIGH (ref 0–0)
PROT SERPL-MCNC: 5.5 G/DL — LOW (ref 6–8.3)
PROT SERPL-MCNC: 6.5 G/DL — SIGNIFICANT CHANGE UP (ref 6–8.3)
PROT UR-MCNC: SIGNIFICANT CHANGE UP
PROTHROM AB SERPL-ACNC: 12.5 SEC — SIGNIFICANT CHANGE UP (ref 10.6–13.6)
RBC # BLD: 3.05 M/UL — LOW (ref 3.8–5.2)
RBC # BLD: 3.35 M/UL — LOW (ref 3.8–5.2)
RBC # FLD: 16.3 % — HIGH (ref 10.3–14.5)
RBC # FLD: 16.6 % — HIGH (ref 10.3–14.5)
RBC BLD AUTO: ABNORMAL
RBC CASTS # UR COMP ASSIST: 2 /HPF — SIGNIFICANT CHANGE UP (ref 0–4)
RH IG SCN BLD-IMP: POSITIVE — SIGNIFICANT CHANGE UP
SARS-COV-2 RNA SPEC QL NAA+PROBE: SIGNIFICANT CHANGE UP
SODIUM SERPL-SCNC: 139 MMOL/L — SIGNIFICANT CHANGE UP (ref 135–145)
SODIUM SERPL-SCNC: 140 MMOL/L — SIGNIFICANT CHANGE UP (ref 135–145)
SP GR SPEC: 1.01 — LOW (ref 1.01–1.02)
SPECIMEN SOURCE: SIGNIFICANT CHANGE UP
UROBILINOGEN FLD QL: NEGATIVE — SIGNIFICANT CHANGE UP
WBC # BLD: 50.47 K/UL — CRITICAL HIGH (ref 3.8–10.5)
WBC # BLD: 56.64 K/UL — CRITICAL HIGH (ref 3.8–10.5)
WBC # FLD AUTO: 50.47 K/UL — CRITICAL HIGH (ref 3.8–10.5)
WBC # FLD AUTO: 56.64 K/UL — CRITICAL HIGH (ref 3.8–10.5)
WBC UR QL: 130 /HPF — HIGH (ref 0–5)

## 2021-09-11 PROCEDURE — 93010 ELECTROCARDIOGRAM REPORT: CPT

## 2021-09-11 PROCEDURE — 99223 1ST HOSP IP/OBS HIGH 75: CPT

## 2021-09-11 PROCEDURE — G1004: CPT

## 2021-09-11 PROCEDURE — 99223 1ST HOSP IP/OBS HIGH 75: CPT | Mod: GC

## 2021-09-11 PROCEDURE — 99285 EMERGENCY DEPT VISIT HI MDM: CPT

## 2021-09-11 PROCEDURE — 71045 X-RAY EXAM CHEST 1 VIEW: CPT | Mod: 26

## 2021-09-11 PROCEDURE — 74176 CT ABD & PELVIS W/O CONTRAST: CPT | Mod: 26,ME

## 2021-09-11 RX ORDER — PIPERACILLIN AND TAZOBACTAM 4; .5 G/20ML; G/20ML
3.38 INJECTION, POWDER, LYOPHILIZED, FOR SOLUTION INTRAVENOUS ONCE
Refills: 0 | Status: COMPLETED | OUTPATIENT
Start: 2021-09-11 | End: 2021-09-11

## 2021-09-11 RX ORDER — SODIUM CHLORIDE 9 MG/ML
1800 INJECTION INTRAMUSCULAR; INTRAVENOUS; SUBCUTANEOUS ONCE
Refills: 0 | Status: COMPLETED | OUTPATIENT
Start: 2021-09-11 | End: 2021-09-11

## 2021-09-11 RX ORDER — ENOXAPARIN SODIUM 100 MG/ML
30 INJECTION SUBCUTANEOUS DAILY
Refills: 0 | Status: DISCONTINUED | OUTPATIENT
Start: 2021-09-11 | End: 2021-09-13

## 2021-09-11 RX ORDER — VANCOMYCIN HCL 1 G
1000 VIAL (EA) INTRAVENOUS ONCE
Refills: 0 | Status: COMPLETED | OUTPATIENT
Start: 2021-09-11 | End: 2021-09-11

## 2021-09-11 RX ORDER — LETROZOLE 2.5 MG/1
1 TABLET, FILM COATED ORAL
Qty: 0 | Refills: 0 | DISCHARGE

## 2021-09-11 RX ORDER — POTASSIUM CHLORIDE 20 MEQ
10 PACKET (EA) ORAL
Refills: 0 | Status: COMPLETED | OUTPATIENT
Start: 2021-09-11 | End: 2021-09-11

## 2021-09-11 RX ORDER — SODIUM CHLORIDE 9 MG/ML
1000 INJECTION INTRAMUSCULAR; INTRAVENOUS; SUBCUTANEOUS
Refills: 0 | Status: COMPLETED | OUTPATIENT
Start: 2021-09-11 | End: 2021-09-11

## 2021-09-11 RX ORDER — INFLUENZA VIRUS VACCINE 15; 15; 15; 15 UG/.5ML; UG/.5ML; UG/.5ML; UG/.5ML
0.5 SUSPENSION INTRAMUSCULAR ONCE
Refills: 0 | Status: COMPLETED | OUTPATIENT
Start: 2021-09-11 | End: 2021-09-11

## 2021-09-11 RX ORDER — PIPERACILLIN AND TAZOBACTAM 4; .5 G/20ML; G/20ML
3.38 INJECTION, POWDER, LYOPHILIZED, FOR SOLUTION INTRAVENOUS EVERY 8 HOURS
Refills: 0 | Status: DISCONTINUED | OUTPATIENT
Start: 2021-09-11 | End: 2021-09-13

## 2021-09-11 RX ORDER — ACETAMINOPHEN 500 MG
650 TABLET ORAL EVERY 6 HOURS
Refills: 0 | Status: DISCONTINUED | OUTPATIENT
Start: 2021-09-11 | End: 2021-09-13

## 2021-09-11 RX ORDER — ENOXAPARIN SODIUM 100 MG/ML
40 INJECTION SUBCUTANEOUS ONCE
Refills: 0 | Status: DISCONTINUED | OUTPATIENT
Start: 2021-09-11 | End: 2021-09-11

## 2021-09-11 RX ORDER — SODIUM CHLORIDE 9 MG/ML
1000 INJECTION, SOLUTION INTRAVENOUS ONCE
Refills: 0 | Status: COMPLETED | OUTPATIENT
Start: 2021-09-11 | End: 2021-09-11

## 2021-09-11 RX ORDER — LANOLIN ALCOHOL/MO/W.PET/CERES
3 CREAM (GRAM) TOPICAL AT BEDTIME
Refills: 0 | Status: DISCONTINUED | OUTPATIENT
Start: 2021-09-11 | End: 2021-09-13

## 2021-09-11 RX ADMIN — Medication 1000 MILLIGRAM(S): at 08:34

## 2021-09-11 RX ADMIN — SODIUM CHLORIDE 50 MILLILITER(S): 9 INJECTION INTRAMUSCULAR; INTRAVENOUS; SUBCUTANEOUS at 18:56

## 2021-09-11 RX ADMIN — Medication 100 MILLIEQUIVALENT(S): at 11:47

## 2021-09-11 RX ADMIN — SODIUM CHLORIDE 1000 MILLILITER(S): 9 INJECTION, SOLUTION INTRAVENOUS at 11:37

## 2021-09-11 RX ADMIN — PIPERACILLIN AND TAZOBACTAM 200 GRAM(S): 4; .5 INJECTION, POWDER, LYOPHILIZED, FOR SOLUTION INTRAVENOUS at 15:28

## 2021-09-11 RX ADMIN — SODIUM CHLORIDE 1000 MILLILITER(S): 9 INJECTION, SOLUTION INTRAVENOUS at 10:37

## 2021-09-11 RX ADMIN — PIPERACILLIN AND TAZOBACTAM 25 GRAM(S): 4; .5 INJECTION, POWDER, LYOPHILIZED, FOR SOLUTION INTRAVENOUS at 20:32

## 2021-09-11 RX ADMIN — Medication 250 MILLIGRAM(S): at 07:34

## 2021-09-11 RX ADMIN — SODIUM CHLORIDE 1800 MILLILITER(S): 9 INJECTION INTRAMUSCULAR; INTRAVENOUS; SUBCUTANEOUS at 10:00

## 2021-09-11 RX ADMIN — Medication 100 MILLIEQUIVALENT(S): at 10:37

## 2021-09-11 RX ADMIN — Medication 10 MILLIEQUIVALENT(S): at 10:42

## 2021-09-11 RX ADMIN — Medication 100 MILLIEQUIVALENT(S): at 09:42

## 2021-09-11 RX ADMIN — PIPERACILLIN AND TAZOBACTAM 200 GRAM(S): 4; .5 INJECTION, POWDER, LYOPHILIZED, FOR SOLUTION INTRAVENOUS at 06:47

## 2021-09-11 RX ADMIN — PIPERACILLIN AND TAZOBACTAM 3.38 GRAM(S): 4; .5 INJECTION, POWDER, LYOPHILIZED, FOR SOLUTION INTRAVENOUS at 07:15

## 2021-09-11 RX ADMIN — ENOXAPARIN SODIUM 30 MILLIGRAM(S): 100 INJECTION SUBCUTANEOUS at 18:56

## 2021-09-11 RX ADMIN — SODIUM CHLORIDE 1800 MILLILITER(S): 9 INJECTION INTRAMUSCULAR; INTRAVENOUS; SUBCUTANEOUS at 06:48

## 2021-09-11 NOTE — CONSULT NOTE ADULT - ATTENDING COMMENTS
70yo F w/ recurrent R breast cancer (s/p bilateral mastectomy, on adjuvant TC) who  presents with loose stool. CT A/P non-con without acute pathology. Stool studies negative. Diarrhea resolved. Was likely chemo induced. Continue supportive care

## 2021-09-11 NOTE — ED ADULT NURSE NOTE - OBJECTIVE STATEMENT
70 yo female with pmh breast CA (last chemo 9/2) presents to ED c/o bloody stools since last night. Pt reports 6-7 episodes of "watery bloody diarrhea." Pt states the blood is "light red." Pt denies abdominal pain, n/v, fevers, chills, body aches, cp, sob, h/a, dizziness, weakness, urinary symptoms, hematuria. Upon assessment, pt a&ox3, nad, breathing spontaneous and nonlabored, able to move all extremities and follow commands, skin warm and appropriate color. Pt safety and comfort provided. 70 yo female with pmh breast CA (last chemo 9/2) presents to ED c/o bloody stools since last night. Pt reports 6-7 episodes of "watery bloody diarrhea." Pt states the blood is "light red." Pt denies abdominal pain, n/v, fevers, chills, body aches, cp, sob, h/a, dizziness, weakness, urinary symptoms, hematuria. Upon assessment, pt a&ox3, nad, breathing spontaneous and nonlabored, able to move all extremities and follow commands, skin warm and appropriate color. Pt placed on CM. Pt safety and comfort provided.

## 2021-09-11 NOTE — H&P ADULT - PROBLEM SELECTOR PLAN 5
- regular diet  - lovenox 40 TC with neulasta on 9/2  - outpatient f/u with Dr. Berumen at [facility]

## 2021-09-11 NOTE — ED PROVIDER NOTE - OBJECTIVE STATEMENT
Patient is a 72 y/o F w/ a PMH of Diverticulosis, undergoing chemotherapy for recurrent R breast carcinoma s/p bilateral radiac masectomy in 2021, s/p R partial masectomy in 2014 w/ Chemoradiation treatment for HRE+ Breast carcinoma, Patient is a 72 y/o F w/ a PMH of Diverticulosis, undergoing chemotherapy for recurrent R breast carcinoma s/p bilateral radical mastectomy in 6/2021, s/p R partial mastectomy in 2014 w/ Chemoradiation treatment for breast carcinoma who presents to the ED w/ 6 episodes of loose stools since last night. States that in last three episodes, noticed blood on Depends but not in toilet bowl or stool. Pain on defecation in lower abdomen, otherwise no abdominal pain. No nausea, vomiting, fever or chills. No dysuria. Last colonoscopy 3 yrs ago, two benign polyps. Has taken pepto bismol with no relief. Last chemo session 9/6/21. No sick contacts.

## 2021-09-11 NOTE — H&P ADULT - PROBLEM SELECTOR PLAN 4
TC with neulasta on 9/2  - outpatient f/u with Dr. Berumen at [facility] watery, slightly bloody. resolving on own, now more solid, unlikely C diff. 2/2 infectious vs chemo (9/2)  - f/u GI PCR

## 2021-09-11 NOTE — ED PROVIDER NOTE - PROGRESS NOTE DETAILS
Shameem PGY 1: Elevated WBC >50, last was ~6 on 9/6. Concern for severe infectious process. Will order sepsis labs, cultures, UA, cxr and start empiric vanc/zosyn. Ct abdomen/pelvis pending. Shameem PGY 1: Elevated WBC >50, last was ~6 on 9/6. Concern for severe infectious process. Will order sepsis labs, cultures, UA, cxr and start empiric vanc/zosyn. Ct abdomen/pelvis pending.    Attending note (Foreign): agree with above. Dedrick Austin, DO PGY-2: POS UA, w/ elevated white count, cannot r/o cdiff, stool PCR + O&P sent, will admit for further workup, CTAP neg

## 2021-09-11 NOTE — H&P ADULT - NSHPPHYSICALEXAM_GEN_ALL_CORE
VITALS:   T(C): 36.7 (09-11-21 @ 13:22), Max: 37.2 (09-11-21 @ 11:38)  HR: 92 (09-11-21 @ 13:22) (78 - 104)  BP: 124/58 (09-11-21 @ 13:22) (113/61 - 129/62)  RR: 16 (09-11-21 @ 13:22) (16 - 18)  SpO2: 94% (09-11-21 @ 13:22) (94% - 100%)    PHYSICAL EXAM:     GENERAL: NAD, lying in bed comfortably  HEAD:  Atraumatic, Normocephalic  EYES: EOMI, PERRLA, conjunctiva and sclera clear  ENT: Moist mucous membranes  NECK: Supple, No JVD  CHEST/LUNG: Clear to auscultation bilaterally; No rales, rhonchi, wheezing, or rubs. Unlabored respirations  HEART: Regular rate and rhythm; No murmurs, rubs, or gallops  ABDOMEN: Soft, nontender, nondistended  EXTREMITIES:  2+ Peripheral Pulses, brisk capillary refill. No clubbing, cyanosis, or edema  Neurological:  A&Ox3, no focal deficits   SKIN: No rashes or lesions  PSYCH: normal affect and mood

## 2021-09-11 NOTE — ED PROVIDER NOTE - NSICDXPASTMEDICALHX_GEN_ALL_CORE_FT
PAST MEDICAL HISTORY:  H/O thyroid nodule     Hypertension     Malignant neoplasm of breast s/p chemo and radiation 2014    Osteoporosis

## 2021-09-11 NOTE — PATIENT PROFILE ADULT - NSPROHMSYMPCOND_GEN_A_NUR
Received pt from ED- pt alert x 3- Nepalese speaking- son at bedside  Recent fall at home- per CT scan  left rib fracture-  TYLENOL # 3 given for pain  - admission completed  -ivf infusing  -Echo/ labs in am  -fall precaution- bed alarm on  -pt stable    Pr ADULT  Goal: Verbalizes/displays adequate comfort level or patient's stated pain goal  Description: INTERVENTIONS:  - Encourage pt to monitor pain and request assistance  - Assess pain using appropriate pain scale  - Administer analgesics based on type and none

## 2021-09-11 NOTE — CONSULT NOTE ADULT - ASSESSMENT
72yo F w/ recurrent R breast cancer (s/p bilateral mastectomy, on adjuvant TC) who   presents with loose stool. Oncology consulted for breast cancer.    Loose stool  - CT A/P non-con without acute pathology  - recommend checking GI PCR, C diff    UTI  - UA large LE, + nitrite, 130 WBC  - ABX per primary team  - f/u UCx, BCx    Breast cancer  - s/p R mastectomy and prophylactic left breast mastectomy 06/15/21. Left breast   showed mild-proliferative fibrocystic changes. Right breast mastectomy showed   10:00 invasive ductal carcinoma 6mm, Karen score 6/9 grade II (3+2+1),   negative margins, ER 90%, AL 0%, HER2 negative IHC 1+, pT1b pNx. Oncotype score   28.   - started adjuvant TC 07/2021 and holding off on endocrine therapy until   chemotherapy completed; most recent C3 TC 09/02 with neulasta  - outpatient f/u with Dr. Berumen at [facility]  - leukocytosis may be 2/2 recent neulasta, infection as above   72yo F w/ recurrent R breast cancer (s/p bilateral mastectomy, on adjuvant TC) who   presents with loose stool. Oncology consulted for breast cancer.    Loose stool  - CT A/P non-con without acute pathology  - recommend checking GI PCR, C diff    UTI  - UA large LE, + nitrite, 130 WBC  - ABX per primary team  - f/u UCx, BCx    Breast cancer  - s/p R mastectomy and prophylactic left breast mastectomy 06/15/21. Left breast   showed mild-proliferative fibrocystic changes. Right breast mastectomy showed   10:00 invasive ductal carcinoma 6mm, Karen score 6/9 grade II (3+2+1), negative margins, ER 90%, [place] 0%, HER2 negative IHC 1+, pT1b pNx. Oncotype score 28.   - started adjuvant TC 07/2021 and holding off on endocrine therapy until   chemotherapy completed; most recent C3 TC 09/02 with neulasta  - outpatient f/u with Dr. Berumen at [facility]    Leukocytosis  - reviewed peripheral smear: numerous neutrophils, bands, metamyelocytes, myelocytes consistent with recent Neulasta  - leukocytosis may be 2/2 recent neulasta, infection as above  - check C diff  - f/u UCx, BCx

## 2021-09-11 NOTE — H&P ADULT - ASSESSMENT
71F with hx of breast cancer (dx in 2014, recurrence 2021) on chemotherapy (last 9/2) and HTN, here for 1 day of watery, slightly bloody diarrhea.  No CT A/P acute findings, but UA positive and marked leukocytosis   Diarrhea potentially 2/2 chemotherapy vs infectious. Leukocytosis potentially 2/2 to Neulasta.    In ED, s/p 1 dose pip/tazo and vanco. S/p 1L LR, 1.8L NS.      Diarrhea  -GI PCR    Loose stool  - CT A/P non-con without acute pathology  - recommend checking GI PCR, C diff    UTI  - UA large LE, + nitrite, 130 WBC  - ABX per primary team  - f/u UCx, BCx    Breast cancer  - s/p R mastectomy and prophylactic left breast mastectomy 06/15/21. Left breast   showed mild-proliferative fibrocystic changes. Right breast mastectomy showed   10:00 invasive ductal carcinoma 6mm, Warren score 6/9 grade II (3+2+1), negative margins, ER 90%, [place] 0%, HER2 negative IHC 1+, pT1b pNx. Oncotype score 28.   - started adjuvant TC 07/2021 and holding off on endocrine therapy until   chemotherapy completed; most recent C3 TC 09/02 with neulasta  - outpatient f/u with Dr. Berumen at [facility]    Leukocytosis  - reviewed peripheral smear: numerous neutrophils, bands, metamyelocytes, myelocytes consistent with recent Neulasta  - leukocytosis may be 2/2 recent neulasta, infection as above  - check C diff  - f/u UCx, BCx 71F with hx of breast cancer (dx in 2014, recurrence 2021) on chemotherapy (last 9/2) and HTN, here for 1 day of watery, slightly bloody diarrhea.  No CT A/P acute findings, but UA positive and marked leukocytosis   Diarrhea potentially 2/2 chemotherapy vs infectious. Leukocytosis potentially 2/2 to Neulasta. UA asymptomatic but i/s/o of chemo.    In ED, s/p 1 dose pip/tazo and vanco. S/p 1L LR, 1.8L NS.

## 2021-09-11 NOTE — ED PROVIDER NOTE - NS ED MD TWO NIGHTS YN
Esme Wooten  1939 12/4/2020      Patient is here for followup visit and is here for left hip joint and left gluteus muscle supplying the left SI joint trigger point injection today. Patient continues to have left hip, groin and low back pain.    She also has low back pain that is facet joint mediated.  She had previous L3-S1 medial branch nerve injection with good relief and is considered a good candidate for RFA of same nerves.    We reviewed the patient's MRI findings again and discussed alternative treatment options.    We also discussed other forms of non-operative care including a home exercise program, physical therapy, chiropractic care, acupuncture, steroid injections, and medical management.    Review of Systems:   Constitutional: Positive for activity change. Negative for fatigue and fever.   HENT: Negative for congestion, ear pain and postnasal drip.    Eyes: Negative for pain and visual disturbance.   Respiratory: Negative for cough and shortness of breath.    Cardiovascular: Negative for leg swelling.   Gastrointestinal: Negative for abdominal pain.   Genitourinary: Negative for difficulty urinating.   Musculoskeletal: Positive for low back pain, left hip and groin and knee pain. Negative for arthralgias and joint swelling.   Skin: Negative for wound.   Neurological: Negative for facial asymmetry and speech difficulty. Numbness/weakness none  Hematological: Negative for adenopathy.     ALLERGIES:   Allergen Reactions   • Codeine NAUSEA   • Bupropion Other (See Comments)     sweats   • Sulfa Antibiotics Other (See Comments)     Reaction not specified       Current Outpatient Medications   Medication Sig Dispense Refill   • warfarin (COUMADIN) 2.5 MG tablet Take 5 mg daily or as directed by the coumadin clinic. 180 tablet 1   • fluticasone (FLONASE) 50 MCG/ACT nasal spray Spray 2 sprays in each nostril daily. SHAKE LIQUID 48 g 3   • tretinoin (RETIN-A) 0.05 % cream Apply topically nightly. 45  g 1   • ferrous gluconate (FERGON) 324 (38 Fe) MG tablet TAKE 1 TABLET BY MOUTH DAILY 90 tablet 3   • zolpidem (Ambien) 10 MG tablet Take 1 tablet by mouth nightly as needed for Sleep. 30 tablet 5   • ondansetron (ZOFRAN) 4 MG tablet Take 1 tablet by mouth every 8 hours as needed for Nausea. 60 tablet 2   • DULoxetine (CYMBALTA) 60 MG capsule TAKE 1 CAPSULE BY MOUTH DAILY 90 capsule 1   • metoPROLOL succinate (TOPROL-XL) 50 MG 24 hr tablet TAKE 1& 1/2 TABLET BY MOUTH NIGHTLY 135 tablet 3   • pantoprazole (PROTONIX) 40 MG tablet TAKE 1 TABLET BY MOUTH TWICE DAILY 30 MINUTES BEFORE BREAKFAST AND DINNER 180 tablet 2   • torsemide (DEMADEX) 20 MG tablet Take 1 tablet by mouth 3 days a week. (Patient taking differently: Take 10 mg by mouth 3 days a week.) 15 tablet 11   • diclofenac (VOLTAREN) 1 % gel Apply 2 g topically 4 times daily. Apply to the affected area. 300 g 5   • oxygen (O2) gas Inhale 2 L/min into the lungs continuous. Only at night     • cholecalciferol (VITAMIN D3) 1000 units tablet Take 1,000 Units by mouth daily.     • Multiple Vitamins-Minerals (MULTIVITAMIN ADULT PO) Take 1 tablet by mouth daily.     • HYDROcodone-acetaminophen (NORCO) 5-325 MG per tablet Take 1 tablet by mouth every 8 hours as needed for Pain. 90 tablet 0   • hydrOXYzine (ATARAX) 25 MG tablet Take 1 tablet by mouth at bedtime as needed for Anxiety (sleep). 30 tablet 11   • enoxaparin (LOVENOX) 80 MG/0.8ML injectable solution Inject 0.8 mLs into the skin every 12 hours. 10 Syringe 0     Current Facility-Administered Medications   Medication Dose Route Frequency Provider Last Rate Last Admin   • methylPREDNISolone DEPOT (DEPO-Medrol) 80 MG/ML injection 80 mg  80 mg Intra-articular Once Jose Guadalupe Patton MD           Past Medical History:   Diagnosis Date   • Adrenal adenoma 1/25/2019    Overview:  left,incidental finding 9/09 ([facility])   • Allergy     [employer] (respiratory distress)   • Anticoagulation management  encounter 6/9/2016 2/18/16: Sev. AS ZITA 0.96 cm².Hyperdynamic LVSF.S/P AVR, mechanical 6/1/16@[facility].  On Coumadin anticoagulation   • Aortic stenosis, severe S/P AVR 10/22/2015    2/18/16: Sev. AS ZITA 0.96 cm².Hyperdynamic LVSF.S/P AVR, mechanical 6/1/16@[facility].  On Coumadin anticoagulation   • Balance problems 11/14/2019   • Benign left Adrenal tumor/nodule     8 x 2.2 cm noted February 2016 .4/26/16Endo silvio w/ Dr. Watt-no pheochromocytoma   • Carpal tunnel syndrome    • Cataracts, both eyes    • Chronic diastolic heart failure (CMS/HCC) 10/13/2017    02/26/2020 echo:  EF 66%, mechanical prosthetic aortic valve, mild AVR   • Chronic JACKSON (dyspnea on exertion) 2/18/2016   • Degeneration of lumbar or lumbosacral intervertebral disc 9/24/2012    Overview:  Chronic left low back pain, with a left lumbar radiculopathy, since 2011.  Diagnostic Studies: Lumbar MRI 7/5/12: The patient has a grade II anterolisthesis of L4 onto L5, with advanced degenerative changes of the disk space with irregularities of the endplates and benign marrow changes. There is severe narrowing of the spinal canal with severe narrowing of the neural foramen on both si   • Depressive disorder    • Disc disease, degenerative, cervical    • Diverticulosis    • Diverticulosis 1/25/2019 4/12/19:LGIB -Hgb was 8.3 ,7.4.S/P1 unit of PRBC .4/16/19 EGD:normal.TC:Hemorrhoids,TIC, tubular adenoma polypectomy-Dr. Vazquez->Prob.TIC bleed on Coumadin.   • Dysphoric mood 6/12/2017   • Essential (primary) hypertension    • Exertional dyspnea 2/18/2016   • Frequent falls 11/14/2019   • GERD (gastroesophageal reflux disease)    • Hallux valgus, acquired 12/12/2013    Overview:  IMO update   • Hemorrhoids 8/21/2020 4/12/19:LGIB -Hgb was 8.3 ,7.4.S/P1 unit of PRBC .4/16/19 EGD:normal.TC:Hemorrhoids,TIC, tubular adenoma polypectomy-Dr. Vazquez->Prob.TIC bleed on Coumadin.   • Hiatal hernia    • History of artificial heart valve/S/P AVR 6/1/2016     @[facility] ER   • History of lower GI bleeding 8/21/2020 4/12/19:LGIB -Hgb was 8.3 ,7.4.S/P1 unit of PRBC .4/16/19 EGD:normal.TC:Hemorrhoids,TIC, tubular adenoma polypectomy-Dr. Vazquez->Prob.TIC bleed on Coumadin.   • Hx Anemia     3./13,/16.  H/H 12.9/41   • Hx Atrial flutter (CMS/HCC) 7/10/2016   • Hx Iron deficiency anemia due to chronic blood loss 10/4/2018    09/23/2019 H/H 14.8/47.1   • IBS (irritable bowel syndrome)    • Insomnia    • Keratoderma    • Long term (current) use of anticoagulants [Z79.01]-Coumadin 2/26/2019    S/P mechanical prostethic AVR  INR goal between 2.5-3.0   • Lumbosacral spondylosis without myelopathy 4/2/2015    S/P Dr. Lj BERG.  4./14/16   • LVH (left ventricular hypertrophy) 2/18/2016    Possible hypertrophic cardiomyopathy; per echo March 2016:  Hyperdynamic left ventricular systolic function. Evidence of mid cavitary onstruction with valsalva maneuver. Resting obstruction, 14.2 mmHg, with valsalva: 58 mmHg    • Nephrolithiasis    • Nonexudative age-related macular degeneration     bilateral, early dry stage   • OA (osteoarthritis) of knee 4/2/2015   • Other plastic surgery for unacceptable cosmetic appearance 12/14/2011   • Paroxysmal atrial flutter (CMS/HCC)    • Plantar fascial fibromatosis 2/1/2016   • Poly DJD Arthritis    • Pulmonary nodules    • S/P AVR     2/18/16: Sev. AS ZITA 0.96 cm².Hyperdynamic LVSF.S/P AVR, mechanical 6/1/16@[facility].  On Coumadin anticoagulation   • S/P AVR (aortic valve replacement) 6/29/2016 June 1, 2016 at [facility]. 21mm Top Hat Mechanical AVR   • Tubular adenoma of colon 8/21/2020 4/12/19:LGIB -Hgb was 8.3 ,7.4.S/P1 unit of PRBC .4/16/19 EGD:normal.TC:Hemorrhoids,TIC, tubular adenoma polypectomy-Dr. Taylor->Prob.TIC bleed on Coumadin.       Physical Examination:  Visit Vitals  Resp 14   Ht 5' 5\" (1.651 m)   Wt 81.6 kg   BMI 29.94 kg/m²     Awake, alert, Not in acute distress.  Neck - Supple, no limitation in range of motion or  paracervical tenderness.  Cardiovascular - Bilateral radial pulses, no evidence of venous disease.  Respiratory - Nonlabored breathing without any retractions.  Back - Paraspinal tenderness in lower lumbar areas, flexion and extension of lumbar spine with some pain.  Left SI joint tender, patricks test positive left  Extremities - No sensory or motor deficits, reflexes 2/4 symmetrical.  Psych - Mood and affect appropriate.    Assessment:   Chronic left hip pain.  Chronic low back pain  Left sacroiliitis  Primary osteoarthritis of left hip  Primary osteoarthritis of both knees  Lumbar facet joint arthropathy  Lumbar spondylosis without myelopathy        Plan:  I recommend left hip joint under ultrasound guidance today.  I injected 40 mg Depo-Medrol and saline into the left hip joint under ultrasound guidance after sterile prep with chlorhexidine and ethylene chloride spray for local anesthesia. Patient tolerated procedure without any adverse reactions.  .   I recommend trigger point injection of the gluteal muscles supplying the left sacroiliac joint under ultrasound guidance today.  I injected 40 mg Depo-Medrol and saline into the gluteus jeff, medius, and minimus muscles supplying the left sacroiliac joint under ultrasound guidance after sterile prep with chlorhexidine and ethylene chloride spray for local anesthesia. Patient tolerated procedure without any adverse reactions.    I also recommend RFA of bilateral medial branch nerves supplying L3-4, L4-5, L5-S1 (3 facet joint levels) under fluoroscopic guidance and moderate sedation starting with the most painful left side first.    I recommend Cool[place] RFA of righ knee genicular nerves.    No refills of any medications today and patient will continue present medications.    Patient is encouraged to perform stretching and home exercise regimen as previously outlined in physical therapy.    Advised to have activity modification.    Patient will return for clinical  evaluation after treatment.    The patient agrees with the above outlined plan and denies having any unanswered questions at this time. Patient may contact our office if there are any further questions or concerns, patient may contact my office at any time.     Lei Calhoun MD  [facility]  [facility]  Board Certified         Yes

## 2021-09-11 NOTE — ED ADULT NURSE REASSESSMENT NOTE - NS ED NURSE REASSESS COMMENT FT1
report received from dominick DODD, pt in nad. vss at this time. denies pain. providing urine sample at this time. will reassess.
pt pending CT read.

## 2021-09-11 NOTE — H&P ADULT - HISTORY OF PRESENT ILLNESS
71F with hx of breast cancer (dx in 2014, recurrence 2021) on chemotherapy (last 9/2) and HTN, here for 1 day of diarrhea. Diarrhea started last night, 6-8 episodes, watery with some blood. Has had two bowel movements in the ED, both more solid than past diarrhea. Denies fever, nausea, vomiting, pain, recent antibiotic use, uncooked foods, similar incidents in past.    Pt has had decreased appetite from the chemo and has been eating and drinking less. Pt denies any other medical history.      71F with hx of breast cancer (dx in 2014, recurrence 2021) on chemotherapy (last 9/2) and HTN, here for 1 day of diarrhea. Diarrhea started last night, 6-8 episodes, watery with some blood. Has had two bowel movements in the ED, both more solid than past diarrhea. Denies fever, nausea, vomiting, pain, recent antibiotic use, uncooked foods, similar incidents in past.    Most recent C3 TC 09/02 with Neulasta.    Pt has had decreased appetite from the chemo and has been eating and drinking less. Pt denies any other medical history.     In ED, s/p 1 dose pip/tazo and vanco. S/p 1L LR, 1.8L NS.  71F with hx of breast cancer (dx in 2014, recurrence 2021) on chemotherapy (last 9/2) and HTN, here for 1 day of diarrhea. Diarrhea started last night, 6-8 episodes, watery with some blood (pt has hemorrhoids and blood is upon wiping). Has had two bowel movements in the ED, both more solid than past diarrhea. Denies fever, nausea, vomiting, pain, recent antibiotic use, uncooked foods, similar incidents in past.    Most recent C3 TC 09/02 with Neulasta.    Pt has had decreased appetite from the chemo and has been eating and drinking less. Pt denies any other medical history.     In ED, s/p 1 dose pip/tazo and vanco. S/p 1L LR, 1.8L NS.

## 2021-09-11 NOTE — H&P ADULT - PROBLEM SELECTOR PLAN 2
watery, slightly bloody. resolving on own, now more solid, unlikely C diff. 2/2 infectious vs chemo (9/2)  - f/u GI PCR asymptomatic. UA large LE, + nitrite, 130 WBC  - c/w zosyn (started 9/11) until ucx, bcx return  - f/u UCx, BCx

## 2021-09-11 NOTE — H&P ADULT - PROBLEM SELECTOR PLAN 1
on admission WBC ~50. 2/2 neulasta vs infection. afebrile. CT abd, CXR clear.  s/p 1 dose pip/tazo, vanco in ED  - f/u UCx  - f/u BCx  - f/u GI PCR  - monitor CBC, temp

## 2021-09-11 NOTE — H&P ADULT - ATTENDING COMMENTS
discussed about plan with resident. no acute complaints  labs and imaging reviewed.  I agree with the above findings, assessment, and plan with the following additions and exceptions:  UTI - marked leukocytosis likely from neulasta  with positive UA - will treat with zosyn for now pending cultures   DAMIAN - likely due to dehydration - will continue IVF for now until diarrhea resolves   monitor cr - adjust abx as needed  avoid nephrotoxic agents  diarrhea resolving - f/u stool PCR, likely due to chemotherapy   only small amount of blood in stool per patient - slight h/h drop may be due to chemo - continue to monitor - maintain active t+S.  hypokalemia - supplement K

## 2021-09-11 NOTE — H&P ADULT - PROBLEM SELECTOR PLAN 3
asymptomatic. UA large LE, + nitrite, 130 WBC  - Abx  - f/u UCx, BCx watery, slightly bloody. resolving on own, now more solid, unlikely C diff. 2/2 infectious vs chemo (9/2)  - f/u GI PCR baseline creatinine ~1.2. DAMIAN likely 2/2 hypovolemia from diarrhea, dec PO intake  s/p ED 1L LR, 1.8L NS  -NS 50/hr

## 2021-09-11 NOTE — PATIENT PROFILE ADULT - BILL PAYMENT
Reason for Call:  Medication or medication refill:    Do you use a [facility]?  Name of the pharmacy and phone number for the current request:  [facility]    Name of the medication requested: Ibuprofen, Gabapentin, Baclofen    Other request: See refill request, patient states she is out of medication    Can we leave a detailed message on this number? YES    Phone number patient can be reached at: Home number on file [phone] (home)    Best Time: any     Call taken on 9/17/2020 at 2:30 PM by Katlyn Aldana      no

## 2021-09-11 NOTE — ED PROVIDER NOTE - SHIFT CHANGE DETAILS
Attending note (Foreign): I have endorsed this patient to the incoming physician, including clinical history, physical exam, current results and assessment/plan.

## 2021-09-11 NOTE — CONSULT NOTE ADULT - SUBJECTIVE AND OBJECTIVE BOX
Hematology Oncology Consult Note    HPI:  72yo F w/ recurrent R breast cancer (s/p bilateral mastectomy, on adjuvant TC) who   presents with loose stool. Oncology consulted for breast cancer.    Onc Hx:  - s/p R lumpectomy 04/2014 and ALND which showed a 2.6cm invasive ductal carcinoma   grade 2 with LVI and PNI; 10/20 right axillary LN were positive for malignancy,   +MARTI; T2N3M0, stage IIIC; ER 90%, IN 60%, HER2 negative IHC 0 staining however   CISH was amplified (2.4).   - s/p adjuvant ddAC-THP and started letrozole 2.5mg daily   - clinically CHRIS until recurrence 05/2021 R breast with biopsy showing invasive   mammary carcinoma, mere score 6/9 (3=2+1), invasive tumor measures at least   0.3cm, no DCIS, ER 90%, IN 0%, HER2 negative 1+ IHC  - s/p R breast mastectomy and prophylactic left breast mastectomy 06/15/21. Left   breast showed mild-proliferative fibrocystic changes. Right breast mastectomy   showed 10:00 invasive ductal carcinoma 6mm, [place] score 6/9 grade II (3+2+1),   negative margins, ER 90%, IN 0%, HER2 negative IHC 1+, pT1b pNx. Oncotype score   28.   - started adjuvant TC 07/2021 and holding off on endocrine therapy until   chemotherapy completed; most recent C3 TC 09/02 with neulasta    PAST MEDICAL & SURGICAL HISTORY:  Hypertension    H/O thyroid nodule    Malignant neoplasm of breast  s/p chemo and radiation 2014    Osteoporosis    Breast cyst, left  1997    S/P breast lumpectomy  right 2014    History of subtotal thyroidectomy    S/P mastectomy  bilateral mastectomy (left prophylactic)        FAMILY HISTORY:      MEDICATIONS  (STANDING):    MEDICATIONS  (PRN):      Allergies    No Known Allergies    Intolerances        SOCIAL HISTORY: No EtOH, no tobacco    Review of Systems:  General: denies fevers/chills  Respiratory: denies cough, shortness of breath  Cardiovascular: denies chest pain, palpitations  Gastrointestinal: denies nausea, vomiting, abdominal pain, constipation, diarrhea, melena, hematochezia  MSK: denies joint pain or muscle pain  Neuro: denies headache, weakness, or parasthesias  Skin: denies rash, petichiae, echymoses  Psych: denies anxiety or sleep disturbances    Height (cm): 157.5 (09-11 @ 02:07)  Weight (kg): 58.1 (09-11 @ 02:07)  BMI (kg/m2): 23.4 (09-11 @ 02:07)  BSA (m2): 1.58 (09-11 @ 02:07)    T(F): 98.9 (09-11-21 @ 11:38), Max: 98.9 (09-11-21 @ 11:38)  HR: 78 (09-11-21 @ 11:38)  BP: 120/57 (09-11-21 @ 11:38)  RR: 17 (09-11-21 @ 11:38)  SpO2: 98% (09-11-21 @ 11:38)  Wt(kg): --    PHYSICAL EXAM:    Constitutional: NAD  Respiratory: CTA b/l, symmetric chest rise, with normal respiratory effort  Cardiovascular: RRR  Gastrointestinal: soft, NTND  Extremities:  no edema  MSK: no obvious abnormalities  Neurological: Grossly intact  Skin: no rash, no echymoses, no petichiae  Psych: normal affect                          10.0   56.64 )-----------( 259      ( 11 Sep 2021 06:03 )             30.5       09-11    139  |  98  |  20  ----------------------------<  106<H>  3.2<L>   |  25  |  2.40<H>    Ca    9.7      11 Sep 2021 06:03    TPro  6.5  /  Alb  3.8  /  TBili  0.2  /  DBili  x   /  AST  27  /  ALT  15  /  AlkPhos  138<H>  09-11           Hematology Oncology Consult Note    HPI:  70yo F w/ recurrent R breast cancer (s/p bilateral mastectomy, on adjuvant TC) who   presents with loose stool. Oncology consulted for breast cancer.    Onc Hx:  - s/p R lumpectomy 04/2014 and ALND which showed a 2.6cm invasive ductal carcinoma   grade 2 with LVI and PNI; 10/20 right axillary LN were positive for malignancy,   +MARTI; T2N3M0, stage IIIC; ER 90%, DE 60%, HER2 negative IHC 0 staining however   CISH was amplified (2.4).   - s/p adjuvant ddAC-THP and started letrozole 2.5mg daily   - clinically CHRIS until recurrence 05/2021 R breast with biopsy showing invasive   mammary carcinoma, mere score 6/9 (3=2+1), invasive tumor measures at least   0.3cm, no DCIS, ER 90%, DE 0%, HER2 negative 1+ IHC  - s/p R breast mastectomy and prophylactic left breast mastectomy 06/15/21. Left   breast showed mild-proliferative fibrocystic changes. Right breast mastectomy   showed 10:00 invasive ductal carcinoma 6mm, [place] score 6/9 grade II (3+2+1),   negative margins, ER 90%, DE 0%, HER2 negative IHC 1+, pT1b pNx. Oncotype score   28.   - started adjuvant TC 07/2021 and holding off on endocrine therapy until   chemotherapy completed; most recent C3 TC 09/02 with neulasta    PAST MEDICAL & SURGICAL HISTORY:  Hypertension    H/O thyroid nodule    Malignant neoplasm of breast  s/p chemo and radiation 2014    Osteoporosis    Breast cyst, left  1997    S/P breast lumpectomy  right 2014    History of subtotal thyroidectomy    S/P mastectomy  bilateral mastectomy (left prophylactic)        FAMILY HISTORY:      MEDICATIONS  (STANDING):    MEDICATIONS  (PRN):      Allergies    No Known Allergies    Intolerances        SOCIAL HISTORY: No EtOH, no tobacco    Review of Systems:  General: denies fevers/chills  Respiratory: denies cough, shortness of breath  Cardiovascular: denies chest pain, palpitations  Gastrointestinal: diarrhea  MSK: denies joint pain or muscle pain  Neuro: denies headache, weakness, or parasthesias  Skin: denies rash, petichiae, echymoses  Psych: denies anxiety or sleep disturbances    Height (cm): 157.5 (09-11 @ 02:07)  Weight (kg): 58.1 (09-11 @ 02:07)  BMI (kg/m2): 23.4 (09-11 @ 02:07)  BSA (m2): 1.58 (09-11 @ 02:07)    T(F): 98.9 (09-11-21 @ 11:38), Max: 98.9 (09-11-21 @ 11:38)  HR: 78 (09-11-21 @ 11:38)  BP: 120/57 (09-11-21 @ 11:38)  RR: 17 (09-11-21 @ 11:38)  SpO2: 98% (09-11-21 @ 11:38)  Wt(kg): --    PHYSICAL EXAM:    Constitutional: NAD  Respiratory: CTA b/l, symmetric chest rise, with normal respiratory effort  Cardiovascular: RRR  Gastrointestinal: soft, NTND  Extremities:  no edema  MSK: no obvious abnormalities  Neurological: Grossly intact  Skin: no rash, no echymoses, no petichiae  Psych: normal affect                          10.0   56.64 )-----------( 259      ( 11 Sep 2021 06:03 )             30.5       09-11    139  |  98  |  20  ----------------------------<  106<H>  3.2<L>   |  25  |  2.40<H>    Ca    9.7      11 Sep 2021 06:03    TPro  6.5  /  Alb  3.8  /  TBili  0.2  /  DBili  x   /  AST  27  /  ALT  15  /  AlkPhos  138<H>  09-11

## 2021-09-11 NOTE — ED PROVIDER NOTE - CHIEF COMPLAINT
The patient is a 71y Female complaining of bloody stools. The patient is a 71y Female complaining of loose stools

## 2021-09-11 NOTE — ED PROVIDER NOTE - ATTENDING CONTRIBUTION TO CARE
Attending Statement (DIANNA Carrasquillo MD):    HPI: 72y/o F with h/o breast cancer on chemotherapy (last dose 9/2) c/o diarrhea and dark/bloody stools since last night, approx 7 episodes.  No nausea/vomiting. +pain in abdomen and rectum with defecation. h/o diverticulosis with episodes of diverticulitis but no bowel surgeries.    Review of Systems:  -General: no fever   -ENT: no congestion, no difficulty swallowing  -Pulmonary: no cough, no shortness of breath  -Cardiac: no chest pain, no palpitations  -Gastrointestinal: +abdominal pain, no nausea, no vomiting, and +diarrhea.  -Genitourinary: no blood or pain with urination  -Musculoskeletal: no back or neck pain  -Skin: no rashes  -Endocrine: No h/o diabetes   -Neurologic: No new weakness or numbness in extremities    All else negative unless otherwise specified elsewhere in this note.    PSH/PMH as noted above    On Physical Exam:  General: well appearing, in NAD, speaking clearly in full sentences and without difficulty; cooperative with exam  HEENT: PERRL, MMM  Neck: no neck tenderness, no nuchal rigidity  Cardiac: normal s1, s2; RRR; no MGR  Lungs: CTABL  Abdomen: soft nontender/nondistended  : no bladder tenderness or distension  Skin: intact, no rash  Extremities: no peripheral edema, no gross deformities  Neuro: no gross neurologic deficits    MDM:  Concern for diverituclitis vs diveritulicular bleed; will obtain labs: cbc (to evaluate for leukocytosis or anemia), CMP (to evaluate for electrolyte abnormalities or renal/liver dysfunction) and obtain CT AP for further evaluation.  Disposition pending review of results of labs/imaging.

## 2021-09-11 NOTE — ED ADULT TRIAGE NOTE - CHIEF COMPLAINT QUOTE
Multiple episodes of bloody stools and diarrhea. Currently undergoing chemotherapy treatment for breast cancer; most recent treatment 9/2.

## 2021-09-11 NOTE — ED PROVIDER NOTE - CLINICAL SUMMARY MEDICAL DECISION MAKING FREE TEXT BOX
72 y/o F with PMH diverticulosis currently on chemotherapy for recurrent breast ca PTED with 6 episodes of loose stools and blood in depends x3. Pain on defecation in lower abdomen. No f/c or n/v. Last colonoscopy 3 yrs ago. Vitals stable. PE w/o abnormalities. Differential included causes of painful rectal bleeding: diverticulitis vs. mass vs. angiodysplasia. No hemorrhoid or fissure on exam so less likely. Less suspicion for IBD given age and presentation. Will order labs and CT imaging, then reassess.

## 2021-09-11 NOTE — ED PROVIDER NOTE - NS ED ROS FT
CONSTITUTIONAL: No fevers, no chills, no lightheadedness, no dizziness  EYES: no visual changes, no eye pain  EARS: no ear drainage, no ear pain, no change in hearing  NOSE: no nasal congestion  MOUTH/THROAT: no sore throat  CV: No chest pain, no palpitations  RESP: No SOB, no cough  GI: see HPI   : no dysuria, no hematuria, no flank pain  MSK: no back pain, no extremity pain  SKIN: no rashes  NEURO: no headache, no focal weakness, no decreased sensation/parasthesias   PSYCHIATRIC: no known mental health issues

## 2021-09-11 NOTE — ED PROVIDER NOTE - NSICDXPASTSURGICALHX_GEN_ALL_CORE_FT
PAST SURGICAL HISTORY:  Breast cyst, left 1997    History of subtotal thyroidectomy     S/P breast lumpectomy right 2014    S/P mastectomy bilateral mastectomy (left prophylactic)

## 2021-09-11 NOTE — ED PROVIDER NOTE - PHYSICAL EXAMINATION
General: Alert and Orientated x 3. No apparent distress.  Head: Normocephalic and atraumatic.  Eyes: PERRLA with EOMI.  Neck: Supple. Trachea midline.   Cardiac: Normal S1 and S2 w/ RRR. No murmurs appreciated.   Pulmonary: No increased WOB. No wheezes or crackles.  Abdominal: Soft, non-tender. (+) bowel sounds appreciated in all 4 quadrants. No hepatosplenomegaly. No rebound, no guarding.   Neurologic: No focal sensory or motor deficits. CN 2-12 grossly intact   Musculoskeletal: Strength appropriate in all 4 extremities for age with no limited ROM.  Skin: Color appropriate for race. Intact, warm, and well-perfused.  Psychiatric: Appropriate mood and affect. No apparent risk to self or others.  Rectal exam: Esme Hawkins (RN). External exam- no hemorrhoids, no anal fissures

## 2021-09-12 ENCOUNTER — TRANSCRIPTION ENCOUNTER (OUTPATIENT)
Age: 71
End: 2021-09-12

## 2021-09-12 LAB
ALBUMIN SERPL ELPH-MCNC: 3 G/DL — LOW (ref 3.3–5)
ALP SERPL-CCNC: 108 U/L — SIGNIFICANT CHANGE UP (ref 40–120)
ALT FLD-CCNC: 12 U/L — SIGNIFICANT CHANGE UP (ref 10–45)
ANION GAP SERPL CALC-SCNC: 15 MMOL/L — SIGNIFICANT CHANGE UP (ref 5–17)
AST SERPL-CCNC: 22 U/L — SIGNIFICANT CHANGE UP (ref 10–40)
BILIRUB SERPL-MCNC: 0.2 MG/DL — SIGNIFICANT CHANGE UP (ref 0.2–1.2)
BUN SERPL-MCNC: 12 MG/DL — SIGNIFICANT CHANGE UP (ref 7–23)
CALCIUM SERPL-MCNC: 8.1 MG/DL — LOW (ref 8.4–10.5)
CHLORIDE SERPL-SCNC: 106 MMOL/L — SIGNIFICANT CHANGE UP (ref 96–108)
CO2 SERPL-SCNC: 22 MMOL/L — SIGNIFICANT CHANGE UP (ref 22–31)
COVID-19 SPIKE DOMAIN AB INTERP: POSITIVE
COVID-19 SPIKE DOMAIN ANTIBODY RESULT: >250 U/ML — HIGH
CREAT SERPL-MCNC: 1.87 MG/DL — HIGH (ref 0.5–1.3)
GLUCOSE SERPL-MCNC: 90 MG/DL — SIGNIFICANT CHANGE UP (ref 70–99)
HCT VFR BLD CALC: 26.6 % — LOW (ref 34.5–45)
HCV AB S/CO SERPL IA: 0.1 S/CO — SIGNIFICANT CHANGE UP (ref 0–0.99)
HCV AB SERPL-IMP: SIGNIFICANT CHANGE UP
HGB BLD-MCNC: 8.7 G/DL — LOW (ref 11.5–15.5)
MAGNESIUM SERPL-MCNC: 1.6 MG/DL — SIGNIFICANT CHANGE UP (ref 1.6–2.6)
MCHC RBC-ENTMCNC: 30.3 PG — SIGNIFICANT CHANGE UP (ref 27–34)
MCHC RBC-ENTMCNC: 32.7 GM/DL — SIGNIFICANT CHANGE UP (ref 32–36)
MCV RBC AUTO: 92.7 FL — SIGNIFICANT CHANGE UP (ref 80–100)
NRBC # BLD: 0 /100 WBCS — SIGNIFICANT CHANGE UP (ref 0–0)
PHOSPHATE SERPL-MCNC: 3.5 MG/DL — SIGNIFICANT CHANGE UP (ref 2.5–4.5)
PLATELET # BLD AUTO: 241 K/UL — SIGNIFICANT CHANGE UP (ref 150–400)
POTASSIUM SERPL-MCNC: 3.1 MMOL/L — LOW (ref 3.5–5.3)
POTASSIUM SERPL-SCNC: 3.1 MMOL/L — LOW (ref 3.5–5.3)
PROT SERPL-MCNC: 5.3 G/DL — LOW (ref 6–8.3)
RBC # BLD: 2.87 M/UL — LOW (ref 3.8–5.2)
RBC # FLD: 16.8 % — HIGH (ref 10.3–14.5)
SARS-COV-2 IGG+IGM SERPL QL IA: >250 U/ML — HIGH
SARS-COV-2 IGG+IGM SERPL QL IA: POSITIVE
SODIUM SERPL-SCNC: 143 MMOL/L — SIGNIFICANT CHANGE UP (ref 135–145)
WBC # BLD: 47.26 K/UL — CRITICAL HIGH (ref 3.8–10.5)
WBC # FLD AUTO: 47.26 K/UL — CRITICAL HIGH (ref 3.8–10.5)

## 2021-09-12 PROCEDURE — 99233 SBSQ HOSP IP/OBS HIGH 50: CPT | Mod: GC

## 2021-09-12 RX ORDER — SODIUM CHLORIDE 9 MG/ML
1000 INJECTION, SOLUTION INTRAVENOUS ONCE
Refills: 0 | Status: COMPLETED | OUTPATIENT
Start: 2021-09-12 | End: 2021-09-12

## 2021-09-12 RX ORDER — POTASSIUM CHLORIDE 20 MEQ
40 PACKET (EA) ORAL EVERY 4 HOURS
Refills: 0 | Status: COMPLETED | OUTPATIENT
Start: 2021-09-12 | End: 2021-09-12

## 2021-09-12 RX ADMIN — Medication 40 MILLIEQUIVALENT(S): at 11:35

## 2021-09-12 RX ADMIN — Medication 40 MILLIEQUIVALENT(S): at 09:50

## 2021-09-12 RX ADMIN — PIPERACILLIN AND TAZOBACTAM 25 GRAM(S): 4; .5 INJECTION, POWDER, LYOPHILIZED, FOR SOLUTION INTRAVENOUS at 22:03

## 2021-09-12 RX ADMIN — PIPERACILLIN AND TAZOBACTAM 25 GRAM(S): 4; .5 INJECTION, POWDER, LYOPHILIZED, FOR SOLUTION INTRAVENOUS at 14:51

## 2021-09-12 RX ADMIN — ENOXAPARIN SODIUM 30 MILLIGRAM(S): 100 INJECTION SUBCUTANEOUS at 18:34

## 2021-09-12 RX ADMIN — SODIUM CHLORIDE 100 MILLILITER(S): 9 INJECTION, SOLUTION INTRAVENOUS at 14:52

## 2021-09-12 RX ADMIN — PIPERACILLIN AND TAZOBACTAM 25 GRAM(S): 4; .5 INJECTION, POWDER, LYOPHILIZED, FOR SOLUTION INTRAVENOUS at 04:48

## 2021-09-12 NOTE — PROGRESS NOTE ADULT - PROBLEM SELECTOR PLAN 1
on admission WBC ~50. 2/2 neulasta vs infection. afebrile. CT abd, CXR clear.  s/p 1 dose pip/tazo, vanco in ED  - GI PCR neg  - f/u UCx  - f/u BCx  - monitor CBC, temp

## 2021-09-12 NOTE — DISCHARGE NOTE PROVIDER - HOSPITAL COURSE
HPI:  71F with hx of breast cancer (dx in 2014, recurrence 2021) on chemotherapy (last 9/2) and HTN, here for 1 day of diarrhea. Diarrhea started last night, 6-8 episodes, watery with some blood (pt has hemorrhoids and blood is upon wiping). Has had two bowel movements in the ED, both more solid than past diarrhea. Denies fever, nausea, vomiting, pain, recent antibiotic use, uncooked foods, similar incidents in past.    Most recent C3 TC 09/02 with Neulasta.    Pt has had decreased appetite from the chemo and has been eating and drinking less. Pt denies any other medical history.     In ED, s/p 1 dose pip/tazo and vanco. S/p 1L LR, 1.8L NS.  (11 Sep 2021 14:31)    Hospital Course:  Patient was admitted after found to have a significant leukocytosis in the 50s with UTI (asymptomatic) as well as DAMIAN likely due to decreased PO intake and diarrhea volume lost.  Patient was placed on pip/tazo. Leukocytosis believed to have been secondary to bone marrow stimulant pt received on 9/2 with chemotherapy. Diarrhea more likely secondary to chemotherapy vs infectious cause. Diarrhea self-resolving. Patient's DAMIAN resolved with fluids over hospital course. Hypertension medications for most of hospital course.  Urine and blood cultures showed XXX  GI PCR was XXX    Patient will follow-up with Dr. Berumen at [facility] with breast cancer treatment. HPI:  71F with hx of breast cancer (dx in 2014, recurrence 2021) on chemotherapy (last 9/2) and HTN, here for 1 day of diarrhea. Diarrhea started last night, 6-8 episodes, watery with some blood (pt has hemorrhoids and blood is upon wiping). Has had two bowel movements in the ED, both more solid than past diarrhea. Denies fever, nausea, vomiting, pain, recent antibiotic use, uncooked foods, similar incidents in past.    Most recent C3 TC 09/02 with Neulasta.    Pt has had decreased appetite from the chemo and has been eating and drinking less. Pt denies any other medical history.     In ED, s/p 1 dose pip/tazo and vanco. S/p 1L LR, 1.8L NS.  (11 Sep 2021 14:31)    Hospital Course:  Patient was admitted after found to have a significant leukocytosis in the 50s with UTI (asymptomatic) as well as DAMIAN likely due to decreased PO intake and diarrhea volume lost.  Patient was placed on pip/tazo. Leukocytosis believed to have been secondary to bone marrow stimulant pt received on 9/2 with chemotherapy. Diarrhea more likely secondary to chemotherapy vs infectious cause. Diarrhea self-resolving. Patient's DAMIAN improved close to baseline with fluids over hospital course. Hypertension medications for most of hospital course.    Urine cultures showed klebsiella pna. BCx NGTD. She was treated zoysn. She will complete a short course of abx with levaquin as outpatient.   GI PCR was negative.    Patient will follow-up with Dr. Berumen at [facility] with breast cancer treatment.

## 2021-09-12 NOTE — PROGRESS NOTE ADULT - PROBLEM SELECTOR PLAN 2
asymptomatic. UA large LE, + nitrite, 130 WBC  - c/w zosyn (started 9/11) until ucx, bcx return  - f/u UCx, BCx

## 2021-09-12 NOTE — DISCHARGE NOTE PROVIDER - NSDCMRMEDTOKEN_GEN_ALL_CORE_FT
hydroCHLOROthiazide 12.5 mg oral capsule: 1 cap(s) orally once a day  losartan 100 mg oral tablet: 1 tab(s) orally once a day   levoFLOXacin 250 mg oral tablet: 1 tab(s) orally once a day

## 2021-09-12 NOTE — PROGRESS NOTE ADULT - PROBLEM SELECTOR PLAN 3
baseline creatinine ~1.2. DAMIAN likely 2/2 hypovolemia from diarrhea, dec PO intake  s/p ED 1L LR, 1.8L NS  -NS 50/hr baseline creatinine ~1.2. DAMIAN likely 2/2 hypovolemia from diarrhea, dec PO intake  s/p ED 1L LR, 1.8L NS  - downtrending Cr  - follow BMP baseline creatinine ~1.2. DAMIAN likely 2/2 hypovolemia from diarrhea, dec PO intake  s/p ED 1L LR, 1.8L NS  - downtrending Cr  - follow BMP  - continue LR 50/hr

## 2021-09-12 NOTE — DISCHARGE NOTE PROVIDER - NSDCCPCAREPLAN_GEN_ALL_CORE_FT
PRINCIPAL DISCHARGE DIAGNOSIS  Diagnosis: Leukocytosis  Assessment and Plan of Treatment: You were found to have a high white blood cell count. Typically this is due to an infection though in your case it may have been due to a bone marrow stimulant you received with your chemotherapy.  To rule out and treat an infection you were placed on antibiotics and had blood, urine, and stool tests sent.  Please follow-up with your oncologist Dr. Berumen.      SECONDARY DISCHARGE DIAGNOSES  Diagnosis: Diarrhea  Assessment and Plan of Treatment: Your diarrhea may have been due to an infection or a side effect of your chemotherapy. It resolved on its own during your stay.    Diagnosis: DAMIAN (acute kidney injury)  Assessment and Plan of Treatment: You were found to have some signs of kidney injury when you first arrived. These signs reverse themselves and were likely due to having low intake of water as well as loss of fluid from the diarrhea you were experiencing.    Diagnosis: UTI (urinary tract infection)  Assessment and Plan of Treatment: You were found to have an asymptomatic UTI during your stay.   Because you are risk factors for being immunocompromised due to your chemotherapy, you were placed on antibiotics during your stay.     PRINCIPAL DISCHARGE DIAGNOSIS  Diagnosis: Leukocytosis  Assessment and Plan of Treatment: You were found to have a high white blood cell count. Typically this is due to an infection though in your case it may have been due to a bone marrow stimulant you received with your chemotherapy.  To rule out and treat an infection you were placed on antibiotics and had blood, urine, and stool tests sent.  Please follow-up with your oncologist Dr. Berumen.      SECONDARY DISCHARGE DIAGNOSES  Diagnosis: UTI (urinary tract infection)  Assessment and Plan of Treatment: You were found to have an asymptomatic UTI during your stay.   Because you are immunocompromised due to your chemotherapy, we elected to treat your bacterial infection. Please continue your antibiotics as prescribed.    Diagnosis: Diarrhea  Assessment and Plan of Treatment: Your diarrhea may have been due to an infection or a side effect of your chemotherapy. It resolved on its own during your stay.    Diagnosis: DAMIAN (acute kidney injury)  Assessment and Plan of Treatment: You were found to have some signs of kidney injury when you first arrived. These signs reverse themselves and were likely due to having low intake of water as well as loss of fluid from the diarrhea you were experiencing.   Because of your acute kidney injury, we stopped your blood pressure medications. While off your medications, your blood pressure continued to be normal. Please follow-up with your PCP to see if you need to restart these medications.

## 2021-09-12 NOTE — PROGRESS NOTE ADULT - SUBJECTIVE AND OBJECTIVE BOX
Alfonzo Christie, PGY1  Pager [phone] [facility], [phone] NS    INCOMPLETE NOTE - IN PROGRESS    Patient is a 71y old  Female who presents with a chief complaint of diarrhea (11 Sep 2021 14:31)      SUBJECTIVE/INTERVAL EVENTS: Patient seen and examined at bedside.    MEDICATIONS  (STANDING):  enoxaparin Injectable 30 milliGRAM(s) SubCutaneous daily  influenza   Vaccine 0.5 milliLiter(s) IntraMuscular once  piperacillin/tazobactam IVPB.. 3.375 Gram(s) IV Intermittent every 8 hours    MEDICATIONS  (PRN):  acetaminophen   Tablet .. 650 milliGRAM(s) Oral every 6 hours PRN Temp greater or equal to 38.5C (101.3F), Mild Pain (1 - 3)  melatonin 3 milliGRAM(s) Oral at bedtime PRN Insomnia      VITAL SIGNS:  T(F): 98.7 (21 @ 21:10), Max: 98.9 (21 @ 11:38)  HR: 91 (21 @ 21:10) (78 - 92)  BP: 124/76 (21 @ 21:10) (120/57 - 131/83)  RR: 18 (21 @ 21:10) (16 - 18)  SpO2: 95% (21 @ 21:10) (94% - 100%)    I&O's Summary    Daily Height in cm: 157.48 (11 Sep 2021 18:05)    Daily Weight in k.3 (11 Sep 2021 21:10)    PHYSICAL EXAM:  Gen: Alert, NAD  HEENT: NCAT, conjunctiva clear, sclera anicteric, no erythema or exudates in the oropharynx, mmm  Neck: Supple, no JVD  CV: RRR, S1S2, no m/r/g  Resp: CTAB, normal respiratory effort  Abd: Soft, nontender, nondistended, normal bowel sounds  Ext: no edema, no clubbing or cyanosis  Neuro: AOx3, CN2-12 grossly intact, LOPEZ  SKIN: warm, perfused    LABS:                        9.1    50.47 )-----------( 232      ( 11 Sep 2021 13:15 )             27.9     Hgb Trend: 9.1<--, 10.0<--  09-11    140  |  104  |  16  ----------------------------<  116<H>  3.4<L>   |  23  |  2.13<H>    Ca    8.5      11 Sep 2021 13:15  Mg     1.6         TPro  5.5<L>  /  Alb  3.2<L>  /  TBili  0.2  /  DBili  x   /  AST  23  /  ALT  12  /  AlkPhos  102      Creatinine Trend: 2.13<--, 2.40<--  LIVER FUNCTIONS - ( 11 Sep 2021 13:15 )  Alb: 3.2 g/dL / Pro: 5.5 g/dL / ALK PHOS: 102 U/L / ALT: 12 U/L / AST: 23 U/L / GGT: x           PT/INR - ( 11 Sep 2021 06:03 )   PT: 12.5 sec;   INR: 1.04 ratio         PTT - ( 11 Sep 2021 06:03 )  PTT:26.8 sec      Urinalysis Basic - ( 11 Sep 2021 07:53 )    Color: Light Yellow / Appearance: Clear / S.008 / pH: x  Gluc: x / Ketone: Negative  / Bili: Negative / Urobili: Negative   Blood: x / Protein: Trace / Nitrite: Positive   Leuk Esterase: Large / RBC: 2 /hpf /  /HPF   Sq Epi: x / Non Sq Epi: 0 /hpf / Bacteria: Many        CAPILLARY BLOOD GLUCOSE          RADIOLOGY & ADDITIONAL TESTS: Reviewed    Imaging Personally Reviewed:    Consultant(s) Notes Reviewed:      Care Discussed with Consultants/Other Providers:   Alfonzo Christie, PGY1  Pager [phone] [facility], [phone] NS    Patient is a 71y old  Female who presents with a chief complaint of diarrhea (11 Sep 2021 14:31)      SUBJECTIVE/INTERVAL EVENTS: Patient seen and examined at bedside. No complaints. Feeling well. No new stools. Denies pain, discomfort.    MEDICATIONS  (STANDING):  enoxaparin Injectable 30 milliGRAM(s) SubCutaneous daily  influenza   Vaccine 0.5 milliLiter(s) IntraMuscular once  piperacillin/tazobactam IVPB.. 3.375 Gram(s) IV Intermittent every 8 hours    MEDICATIONS  (PRN):  acetaminophen   Tablet .. 650 milliGRAM(s) Oral every 6 hours PRN Temp greater or equal to 38.5C (101.3F), Mild Pain (1 - 3)  melatonin 3 milliGRAM(s) Oral at bedtime PRN Insomnia      VITAL SIGNS:  T(F): 98.7 (21 @ 21:10), Max: 98.9 (21 @ 11:38)  HR: 91 (21 @ 21:10) (78 - 92)  BP: 124/76 (21 @ 21:10) (120/57 - 131/83)  RR: 18 (21 @ 21:10) (16 - 18)  SpO2: 95% (21 @ 21:10) (94% - 100%)    I&O's Summary    Daily Height in cm: 157.48 (11 Sep 2021 18:05)    Daily Weight in k.3 (11 Sep 2021 21:10)    PHYSICAL EXAM:  Gen: Alert, NAD  HEENT: NCAT, conjunctiva clear, sclera anicteric, no erythema or exudates in the oropharynx, mmm  Neck: Supple, no JVD  CV: RRR, S1S2, no m/r/g  Resp: CTAB, normal respiratory effort  Abd: Soft, nontender, nondistended, normal bowel sounds  Ext: no edema, no clubbing or cyanosis  Neuro: AOx3, CN2-12 grossly intact, LOPEZ  SKIN: warm, perfused    LABS:                        9.1    50.47 )-----------( 232      ( 11 Sep 2021 13:15 )             27.9     Hgb Trend: 9.1<--, 10.0<--      140  |  104  |  16  ----------------------------<  116<H>  3.4<L>   |  23  |  2.13<H>    Ca    8.5      11 Sep 2021 13:15  Mg     1.6         TPro  5.5<L>  /  Alb  3.2<L>  /  TBili  0.2  /  DBili  x   /  AST  23  /  ALT  12  /  AlkPhos  102      Creatinine Trend: 2.13<--, 2.40<--  LIVER FUNCTIONS - ( 11 Sep 2021 13:15 )  Alb: 3.2 g/dL / Pro: 5.5 g/dL / ALK PHOS: 102 U/L / ALT: 12 U/L / AST: 23 U/L / GGT: x           PT/INR - ( 11 Sep 2021 06:03 )   PT: 12.5 sec;   INR: 1.04 ratio         PTT - ( 11 Sep 2021 06:03 )  PTT:26.8 sec      Urinalysis Basic - ( 11 Sep 2021 07:53 )    Color: Light Yellow / Appearance: Clear / S.008 / pH: x  Gluc: x / Ketone: Negative  / Bili: Negative / Urobili: Negative   Blood: x / Protein: Trace / Nitrite: Positive   Leuk Esterase: Large / RBC: 2 /hpf /  /HPF   Sq Epi: x / Non Sq Epi: 0 /hpf / Bacteria: Many        CAPILLARY BLOOD GLUCOSE          RADIOLOGY & ADDITIONAL TESTS: Reviewed    Imaging Personally Reviewed:    Consultant(s) Notes Reviewed:      Care Discussed with Consultants/Other Providers:

## 2021-09-12 NOTE — DISCHARGE NOTE PROVIDER - CARE PROVIDER_API CALL
AYALA PHAM  Internal Medicine  [address] [place] [address]  Phone: [phone]  Fax: [phone]  Established Patient  Follow Up Time: 1 week

## 2021-09-13 ENCOUNTER — TRANSCRIPTION ENCOUNTER (OUTPATIENT)
Age: 71
End: 2021-09-13

## 2021-09-13 VITALS
DIASTOLIC BLOOD PRESSURE: 72 MMHG | HEART RATE: 98 BPM | RESPIRATION RATE: 18 BRPM | TEMPERATURE: 98 F | OXYGEN SATURATION: 95 % | SYSTOLIC BLOOD PRESSURE: 125 MMHG

## 2021-09-13 LAB
-  AMIKACIN: SIGNIFICANT CHANGE UP
-  AMOXICILLIN/CLAVULANIC ACID: SIGNIFICANT CHANGE UP
-  AMPICILLIN/SULBACTAM: SIGNIFICANT CHANGE UP
-  AMPICILLIN: SIGNIFICANT CHANGE UP
-  AZTREONAM: SIGNIFICANT CHANGE UP
-  CEFAZOLIN: SIGNIFICANT CHANGE UP
-  CEFEPIME: SIGNIFICANT CHANGE UP
-  CEFOXITIN: SIGNIFICANT CHANGE UP
-  CEFTRIAXONE: SIGNIFICANT CHANGE UP
-  CIPROFLOXACIN: SIGNIFICANT CHANGE UP
-  ERTAPENEM: SIGNIFICANT CHANGE UP
-  GENTAMICIN: SIGNIFICANT CHANGE UP
-  IMIPENEM: SIGNIFICANT CHANGE UP
-  LEVOFLOXACIN: SIGNIFICANT CHANGE UP
-  MEROPENEM: SIGNIFICANT CHANGE UP
-  NITROFURANTOIN: SIGNIFICANT CHANGE UP
-  PIPERACILLIN/TAZOBACTAM: SIGNIFICANT CHANGE UP
-  TIGECYCLINE: SIGNIFICANT CHANGE UP
-  TOBRAMYCIN: SIGNIFICANT CHANGE UP
-  TRIMETHOPRIM/SULFAMETHOXAZOLE: SIGNIFICANT CHANGE UP
ANION GAP SERPL CALC-SCNC: 13 MMOL/L — SIGNIFICANT CHANGE UP (ref 5–17)
BASOPHILS # BLD AUTO: 0 K/UL — SIGNIFICANT CHANGE UP (ref 0–0.2)
BASOPHILS NFR BLD AUTO: 0 % — SIGNIFICANT CHANGE UP (ref 0–2)
BUN SERPL-MCNC: 9 MG/DL — SIGNIFICANT CHANGE UP (ref 7–23)
CALCIUM SERPL-MCNC: 8.7 MG/DL — SIGNIFICANT CHANGE UP (ref 8.4–10.5)
CHLORIDE SERPL-SCNC: 107 MMOL/L — SIGNIFICANT CHANGE UP (ref 96–108)
CO2 SERPL-SCNC: 22 MMOL/L — SIGNIFICANT CHANGE UP (ref 22–31)
CREAT SERPL-MCNC: 1.53 MG/DL — HIGH (ref 0.5–1.3)
CULTURE RESULTS: SIGNIFICANT CHANGE UP
EOSINOPHIL # BLD AUTO: 0 K/UL — SIGNIFICANT CHANGE UP (ref 0–0.5)
EOSINOPHIL NFR BLD AUTO: 0 % — SIGNIFICANT CHANGE UP (ref 0–6)
GLUCOSE SERPL-MCNC: 97 MG/DL — SIGNIFICANT CHANGE UP (ref 70–99)
HCT VFR BLD CALC: 25.3 % — LOW (ref 34.5–45)
HGB BLD-MCNC: 8.3 G/DL — LOW (ref 11.5–15.5)
LYMPHOCYTES # BLD AUTO: 2.18 K/UL — SIGNIFICANT CHANGE UP (ref 1–3.3)
LYMPHOCYTES # BLD AUTO: 7 % — LOW (ref 13–44)
MAGNESIUM SERPL-MCNC: 1.4 MG/DL — LOW (ref 1.6–2.6)
MANUAL SMEAR VERIFICATION: SIGNIFICANT CHANGE UP
MCHC RBC-ENTMCNC: 30 PG — SIGNIFICANT CHANGE UP (ref 27–34)
MCHC RBC-ENTMCNC: 32.8 GM/DL — SIGNIFICANT CHANGE UP (ref 32–36)
MCV RBC AUTO: 91.3 FL — SIGNIFICANT CHANGE UP (ref 80–100)
METAMYELOCYTES # FLD: 2.6 % — HIGH (ref 0–0)
METHOD TYPE: SIGNIFICANT CHANGE UP
MONOCYTES # BLD AUTO: 1.09 K/UL — HIGH (ref 0–0.9)
MONOCYTES NFR BLD AUTO: 3.5 % — SIGNIFICANT CHANGE UP (ref 2–14)
MYELOCYTES NFR BLD: 4.3 % — HIGH (ref 0–0)
NEUTROPHILS # BLD AUTO: 25.74 K/UL — HIGH (ref 1.8–7.4)
NEUTROPHILS NFR BLD AUTO: 80 % — HIGH (ref 43–77)
NEUTS BAND # BLD: 2.6 % — SIGNIFICANT CHANGE UP (ref 0–8)
ORGANISM # SPEC MICROSCOPIC CNT: SIGNIFICANT CHANGE UP
ORGANISM # SPEC MICROSCOPIC CNT: SIGNIFICANT CHANGE UP
PHOSPHATE SERPL-MCNC: 3.3 MG/DL — SIGNIFICANT CHANGE UP (ref 2.5–4.5)
PLAT MORPH BLD: NORMAL — SIGNIFICANT CHANGE UP
PLATELET # BLD AUTO: 250 K/UL — SIGNIFICANT CHANGE UP (ref 150–400)
POTASSIUM SERPL-MCNC: 3.4 MMOL/L — LOW (ref 3.5–5.3)
POTASSIUM SERPL-SCNC: 3.4 MMOL/L — LOW (ref 3.5–5.3)
RBC # BLD: 2.77 M/UL — LOW (ref 3.8–5.2)
RBC # FLD: 16.8 % — HIGH (ref 10.3–14.5)
RBC BLD AUTO: SIGNIFICANT CHANGE UP
SODIUM SERPL-SCNC: 142 MMOL/L — SIGNIFICANT CHANGE UP (ref 135–145)
SPECIMEN SOURCE: SIGNIFICANT CHANGE UP
TOXIC GRANULES BLD QL SMEAR: PRESENT — SIGNIFICANT CHANGE UP
WBC # BLD: 31.16 K/UL — HIGH (ref 3.8–10.5)
WBC # FLD AUTO: 31.16 K/UL — HIGH (ref 3.8–10.5)

## 2021-09-13 PROCEDURE — 82435 ASSAY OF BLOOD CHLORIDE: CPT

## 2021-09-13 PROCEDURE — 96365 THER/PROPH/DIAG IV INF INIT: CPT

## 2021-09-13 PROCEDURE — G1004: CPT

## 2021-09-13 PROCEDURE — 85610 PROTHROMBIN TIME: CPT

## 2021-09-13 PROCEDURE — 86900 BLOOD TYPING SEROLOGIC ABO: CPT

## 2021-09-13 PROCEDURE — 82947 ASSAY GLUCOSE BLOOD QUANT: CPT

## 2021-09-13 PROCEDURE — 87040 BLOOD CULTURE FOR BACTERIA: CPT

## 2021-09-13 PROCEDURE — 80053 COMPREHEN METABOLIC PANEL: CPT

## 2021-09-13 PROCEDURE — 87177 OVA AND PARASITES SMEARS: CPT

## 2021-09-13 PROCEDURE — 99285 EMERGENCY DEPT VISIT HI MDM: CPT

## 2021-09-13 PROCEDURE — 84100 ASSAY OF PHOSPHORUS: CPT

## 2021-09-13 PROCEDURE — 71045 X-RAY EXAM CHEST 1 VIEW: CPT

## 2021-09-13 PROCEDURE — 96361 HYDRATE IV INFUSION ADD-ON: CPT

## 2021-09-13 PROCEDURE — 87186 SC STD MICRODIL/AGAR DIL: CPT

## 2021-09-13 PROCEDURE — 83735 ASSAY OF MAGNESIUM: CPT

## 2021-09-13 PROCEDURE — 82565 ASSAY OF CREATININE: CPT

## 2021-09-13 PROCEDURE — U0003: CPT

## 2021-09-13 PROCEDURE — 83605 ASSAY OF LACTIC ACID: CPT

## 2021-09-13 PROCEDURE — 93005 ELECTROCARDIOGRAM TRACING: CPT

## 2021-09-13 PROCEDURE — 85025 COMPLETE CBC W/AUTO DIFF WBC: CPT

## 2021-09-13 PROCEDURE — 96375 TX/PRO/DX INJ NEW DRUG ADDON: CPT

## 2021-09-13 PROCEDURE — 80048 BASIC METABOLIC PNL TOTAL CA: CPT

## 2021-09-13 PROCEDURE — 96367 TX/PROPH/DG ADDL SEQ IV INF: CPT

## 2021-09-13 PROCEDURE — 84295 ASSAY OF SERUM SODIUM: CPT

## 2021-09-13 PROCEDURE — 85018 HEMOGLOBIN: CPT

## 2021-09-13 PROCEDURE — U0005: CPT

## 2021-09-13 PROCEDURE — 84132 ASSAY OF SERUM POTASSIUM: CPT

## 2021-09-13 PROCEDURE — 87507 IADNA-DNA/RNA PROBE TQ 12-25: CPT

## 2021-09-13 PROCEDURE — 85014 HEMATOCRIT: CPT

## 2021-09-13 PROCEDURE — 87086 URINE CULTURE/COLONY COUNT: CPT

## 2021-09-13 PROCEDURE — 86901 BLOOD TYPING SEROLOGIC RH(D): CPT

## 2021-09-13 PROCEDURE — 82803 BLOOD GASES ANY COMBINATION: CPT

## 2021-09-13 PROCEDURE — 81001 URINALYSIS AUTO W/SCOPE: CPT

## 2021-09-13 PROCEDURE — 86850 RBC ANTIBODY SCREEN: CPT

## 2021-09-13 PROCEDURE — 85730 THROMBOPLASTIN TIME PARTIAL: CPT

## 2021-09-13 PROCEDURE — 99239 HOSP IP/OBS DSCHRG MGMT >30: CPT | Mod: GC

## 2021-09-13 PROCEDURE — 74176 CT ABD & PELVIS W/O CONTRAST: CPT | Mod: ME

## 2021-09-13 PROCEDURE — 82330 ASSAY OF CALCIUM: CPT

## 2021-09-13 RX ORDER — POTASSIUM CHLORIDE 20 MEQ
40 PACKET (EA) ORAL EVERY 4 HOURS
Refills: 0 | Status: COMPLETED | OUTPATIENT
Start: 2021-09-13 | End: 2021-09-13

## 2021-09-13 RX ORDER — LOSARTAN POTASSIUM 100 MG/1
1 TABLET, FILM COATED ORAL
Qty: 0 | Refills: 0 | DISCHARGE

## 2021-09-13 RX ORDER — MAGNESIUM SULFATE 500 MG/ML
2 VIAL (ML) INJECTION ONCE
Refills: 0 | Status: COMPLETED | OUTPATIENT
Start: 2021-09-13 | End: 2021-09-13

## 2021-09-13 RX ADMIN — Medication 40 MILLIEQUIVALENT(S): at 10:49

## 2021-09-13 RX ADMIN — Medication 40 MILLIEQUIVALENT(S): at 12:51

## 2021-09-13 RX ADMIN — Medication 50 GRAM(S): at 10:50

## 2021-09-13 RX ADMIN — PIPERACILLIN AND TAZOBACTAM 25 GRAM(S): 4; .5 INJECTION, POWDER, LYOPHILIZED, FOR SOLUTION INTRAVENOUS at 05:26

## 2021-09-13 NOTE — PROGRESS NOTE ADULT - PROBLEM SELECTOR PLAN 6
-holding home losartan and hydrochlorothiazide i/c/o of DAMIAN
-holding home losartan and hydrochlorothiazide i/c/o of DAMIAN

## 2021-09-13 NOTE — PROGRESS NOTE ADULT - PROBLEM SELECTOR PLAN 4
watery, slightly bloody. resolving on own, now more solid, unlikely C diff. 2/2 infectious vs chemo (9/2)  -GI PCR neg
watery, slightly bloody. resolving on own, now more solid, unlikely C diff. 2/2 infectious vs chemo (9/2)  -GI PCR neg

## 2021-09-13 NOTE — PROGRESS NOTE ADULT - PROBLEM SELECTOR PLAN 5
TC with neulasta on 9/2  - outpatient f/u with Dr. Berumen at [facility]
TC with neulasta on 9/2  - outpatient f/u with Dr. Berumen at [facility]

## 2021-09-13 NOTE — PROGRESS NOTE ADULT - SUBJECTIVE AND OBJECTIVE BOX
PROGRESS NOTE:   Authored by ALMA Woodward PGY1 Pager: [phone]    Patient is a 71y old  Female who presents with a chief complaint of diarrhea (13 Sep 2021 08:12)      SUBJECTIVE / OVERNIGHT EVENTS:    ADDITIONAL REVIEW OF SYSTEMS:    MEDICATIONS  (STANDING):  enoxaparin Injectable 30 milliGRAM(s) SubCutaneous daily  piperacillin/tazobactam IVPB.. 3.375 Gram(s) IV Intermittent every 8 hours    MEDICATIONS  (PRN):  acetaminophen   Tablet .. 650 milliGRAM(s) Oral every 6 hours PRN Temp greater or equal to 38.5C (101.3F), Mild Pain (1 - 3)  melatonin 3 milliGRAM(s) Oral at bedtime PRN Insomnia      CAPILLARY BLOOD GLUCOSE        I&O's Summary    12 Sep 2021 07:01  -  13 Sep 2021 07:00  --------------------------------------------------------  IN: 960 mL / OUT: 0 mL / NET: 960 mL        Vital Signs Last 24 Hrs  T(C): 36.8 (13 Sep 2021 05:33), Max: 37.4 (12 Sep 2021 17:16)  T(F): 98.3 (13 Sep 2021 05:33), Max: 99.3 (12 Sep 2021 17:16)  HR: 83 (13 Sep 2021 05:33) (82 - 93)  BP: 119/73 (13 Sep 2021 05:33) (110/68 - 134/81)  BP(mean): --  RR: 18 (13 Sep 2021 05:33) (18 - 18)  SpO2: 97% (13 Sep 2021 05:33) (94% - 97%)    CONSTITUTIONAL: NAD, well-developed, well-groomed  EYES: PERRLA; conjunctiva and sclera clear  ENMT: Moist oral mucosa, no pharyngeal injection or exudates; normal dentition  NECK: Supple, no palpable masses; no thyromegaly  RESPIRATORY: Normal respiratory effort; lungs are clear to auscultation bilaterally  CARDIOVASCULAR: Regular rate and rhythm, normal S1 and S2, no murmur/rub/gallop; No lower extremity edema; Peripheral pulses are 2+ bilaterally  ABDOMEN: Nontender to palpation, normoactive bowel sounds, no rebound/guarding; No hepatosplenomegaly  MUSCULOSKELETAL:  Normal gait; no clubbing or cyanosis of digits; no joint swelling or tenderness to palpation  PSYCH: A+O to person, place, and time; affect appropriate  NEUROLOGY: CN 2-12 are intact and symmetric; no gross sensory deficits   SKIN: No rashes; no palpable lesions  PHYSICAL EXAM:    LABS:                        8.3    31.16 )-----------( 250      ( 13 Sep 2021 07:10 )             25.3     09-13    142  |  107  |  9   ----------------------------<  97  3.4<L>   |  22  |  1.53<H>    Ca    8.7      13 Sep 2021 07:09  Phos  3.3     09-13  Mg     1.4     09-13    TPro  5.3<L>  /  Alb  3.0<L>  /  TBili  0.2  /  DBili  x   /  AST  22  /  ALT  12  /  AlkPhos  108  09-12              GI PCR Panel, Stool (collected 11 Sep 2021 16:28)  Source: .Stool Feces  Final Report (11 Sep 2021 21:02):    GI PCR Results: NOT detected    *******Please Note:*******    GI panel PCR evaluates for:    Campylobacter, Plesiomonas shigelloides, Salmonella,    Vibrio, Yersinia enterocolitica, Enteroaggregative    Escherichia coli (EAEC), Enteropathogenic E.coli (EPEC),    Enterotoxigenic E. coli (ETEC) lt/st, Shiga-like    toxin-producing E. coli (STEC) stx1/stx2,    Shigella/ Enteroinvasive E. coli (EIEC), Cryptosporidium,    Cyclospora cayetanensis, Entamoeba histolytica,    Giardia lamblia, Adenovirus F 40/41, Astrovirus,    Norovirus GI/GII, Rotavirus A, Sapovirus    Culture - Urine (collected 11 Sep 2021 15:09)  Source: Clean Catch Clean Catch (Midstream)  Preliminary Report (12 Sep 2021 21:05):    >100,000 CFU/ml Klebsiella pneumoniae    Culture - Blood (collected 11 Sep 2021 09:07)  Source: .Blood Blood-Peripheral  Preliminary Report (12 Sep 2021 10:01):    No growth to date.    Culture - Blood (collected 11 Sep 2021 09:07)  Source: .Blood Blood-Peripheral  Preliminary Report (12 Sep 2021 10:01):    No growth to date.        RADIOLOGY & ADDITIONAL TESTS:  Results Reviewed:   Imaging Personally Reviewed:  Electrocardiogram Personally Reviewed:    COORDINATION OF CARE:  Care Discussed with Consultants/Other Providers [Y/N]:  Prior or Outpatient Records Reviewed [Y/N]:   PROGRESS NOTE:   Authored by ALMA Woodward PGY1 Pager: [phone]    Patient is a 71y old  Female who presents with a chief complaint of diarrhea (13 Sep 2021 08:12)      SUBJECTIVE / OVERNIGHT EVENTS:  No o/n events. Sts had 2 semi-formed BMs overnight. Denies N/F/V/chills/ CP/ SOB/ abdominal pain.       MEDICATIONS  (STANDING):  enoxaparin Injectable 30 milliGRAM(s) SubCutaneous daily  piperacillin/tazobactam IVPB.. 3.375 Gram(s) IV Intermittent every 8 hours    MEDICATIONS  (PRN):  acetaminophen   Tablet .. 650 milliGRAM(s) Oral every 6 hours PRN Temp greater or equal to 38.5C (101.3F), Mild Pain (1 - 3)  melatonin 3 milliGRAM(s) Oral at bedtime PRN Insomnia      CAPILLARY BLOOD GLUCOSE      I&O's Summary    12 Sep 2021 07:01  -  13 Sep 2021 07:00  --------------------------------------------------------  IN: 960 mL / OUT: 0 mL / NET: 960 mL    Vital Signs Last 24 Hrs  T(C): 36.8 (13 Sep 2021 05:33), Max: 37.4 (12 Sep 2021 17:16)  T(F): 98.3 (13 Sep 2021 05:33), Max: 99.3 (12 Sep 2021 17:16)  HR: 83 (13 Sep 2021 05:33) (82 - 93)  BP: 119/73 (13 Sep 2021 05:33) (110/68 - 134/81)  BP(mean): --  RR: 18 (13 Sep 2021 05:33) (18 - 18)  SpO2: 97% (13 Sep 2021 05:33) (94% - 97%)    CONSTITUTIONAL: NAD, well-developed, well-groomed  EYES: EOMI; conjunctiva and sclera clear  ENMT: Moist oral mucosa  NECK: Supple, no palpable masses  RESPIRATORY: Normal respiratory effort; lungs are clear to auscultation bilaterally  CARDIOVASCULAR: Regular rate and rhythm, normal S1 and S2, no murmur/rub/gallop; No lower extremity edema; Peripheral pulses are 2+ bilaterally  ABDOMEN: Nontender to palpation, normoactive bowel sounds, no rebound/guarding  MUSCULOSKELETAL:  Normal gait; no clubbing or cyanosis of digits  PSYCH: A+O to person, place, and time; affect appropriate  NEUROLOGY: no focal deficits noted  SKIN: No rashes; no palpable lesions  PHYSICAL EXAM:    LABS:                        8.3    31.16 )-----------( 250      ( 13 Sep 2021 07:10 )             25.3     09-13    142  |  107  |  9   ----------------------------<  97  3.4<L>   |  22  |  1.53<H>    Ca    8.7      13 Sep 2021 07:09  Phos  3.3     09-13  Mg     1.4     09-13    TPro  5.3<L>  /  Alb  3.0<L>  /  TBili  0.2  /  DBili  x   /  AST  22  /  ALT  12  /  AlkPhos  108  09-12              GI PCR Panel, Stool (collected 11 Sep 2021 16:28)  Source: .Stool Feces  Final Report (11 Sep 2021 21:02):    GI PCR Results: NOT detected    *******Please Note:*******    GI panel PCR evaluates for:    Campylobacter, Plesiomonas shigelloides, Salmonella,    Vibrio, Yersinia enterocolitica, Enteroaggregative    Escherichia coli (EAEC), Enteropathogenic E.coli (EPEC),    Enterotoxigenic E. coli (ETEC) lt/st, Shiga-like    toxin-producing E. coli (STEC) stx1/stx2,    Shigella/ Enteroinvasive E. coli (EIEC), Cryptosporidium,    Cyclospora cayetanensis, Entamoeba histolytica,    Giardia lamblia, Adenovirus F 40/41, Astrovirus,    Norovirus GI/GII, Rotavirus A, Sapovirus    Culture - Urine (collected 11 Sep 2021 15:09)  Source: Clean Catch Clean Catch (Midstream)  Preliminary Report (12 Sep 2021 21:05):    >100,000 CFU/ml Klebsiella pneumoniae    Culture - Blood (collected 11 Sep 2021 09:07)  Source: .Blood Blood-Peripheral  Preliminary Report (12 Sep 2021 10:01):    No growth to date.    Culture - Blood (collected 11 Sep 2021 09:07)  Source: .Blood Blood-Peripheral  Preliminary Report (12 Sep 2021 10:01):    No growth to date.        RADIOLOGY & ADDITIONAL TESTS:  Results Reviewed:   Imaging Personally Reviewed:  Electrocardiogram Personally Reviewed:    COORDINATION OF CARE:  Care Discussed with Consultants/Other Providers [Y/N]:  Prior or Outpatient Records Reviewed [Y/N]:

## 2021-09-13 NOTE — PROGRESS NOTE ADULT - ASSESSMENT
71F with hx of breast cancer (dx in 2014, recurrence 2021) on chemotherapy (last 9/2) and HTN, here for 1 day of watery, slightly bloody diarrhea.  No CT A/P acute findings, but UA positive and marked leukocytosis   Diarrhea potentially 2/2 chemotherapy vs infectious. Leukocytosis potentially 2/2 to Neulasta. UA asymptomatic but i/s/o of chemo.    In ED, s/p 1 dose pip/tazo and vanco. S/p 1L LR, 1.8L NS.        
71F with hx of breast cancer (dx in 2014, recurrence 2021) on chemotherapy (last 9/2) and HTN, here for 1 day of watery, slightly bloody diarrhea.  No CT A/P acute findings, but UA positive and marked leukocytosis   Diarrhea potentially 2/2 chemotherapy vs infectious. Leukocytosis potentially 2/2 to Neulasta. UA asymptomatic but i/s/o of chemo.    In ED, s/p 1 dose pip/tazo and vanco. S/p 1L LR, 1.8L NS.

## 2021-09-13 NOTE — PROGRESS NOTE ADULT - PROBLEM SELECTOR PLAN 1
on admission WBC ~50. 2/2 neulasta vs infection. afebrile. CT abd, CXR clear.  s/p 1 dose pip/tazo, vanco in ED  - GI PCR neg  - f/u UCx  - f/u BCx  - monitor CBC, temp on admission WBC ~50. 2/2 neulasta vs infection. afebrile. CT abd, CXR clear.  s/p 1 dose pip/tazo, vanco in ED  - GI PCR neg  - UCx- klebsiella - plan to start on Levaquin as outpatient upon discharge   - awaiting BCx

## 2021-09-13 NOTE — PROGRESS NOTE ADULT - ATTENDING COMMENTS
71F with hx of breast cancer (dx in 2014, recurrence 2021) on chemotherapy (last 9/2) and HTN, here for 1 day of watery, slightly bloody diarrhea.    Pt seen and examined at bedside this morning. She appears well. NAD. Her diarrhea has resolved and she was able to eat a full breakfast.     Leukocytosis due to multifactorial causes  Diarrhea due to Colitis, infectious vs chemo induced  DAMIAN due to volume depletion from diarrhea  UTI likely cross contamination from Diarrhea  Breast Cancer undergoing Chemotherapy    wbc slightly trending down likely from IVF  May be from Neulasta although her wbc was ~7 on the day she received it after he last chemo.  continue Zosyn for Colitis and UTI  Continue IVF, trend BUN/Cr  Regular diet  Trend Wbc  If further improvement in Cr and resolution of symptoms, dc planning potentially tomorrow  Discussed with patient and resident in detail
71F with hx of breast cancer (dx in 2014, recurrence 2021) on chemotherapy (last 9/2) and HTN, who presented for 1 day of watery, slightly bloody diarrhea.  Seen sitting in a chair, diarrhea resolved, NAD.   Diarrhea likely 2/2 chemo, CT abd/pelvis negative for acute pathology and GI PCR negative.   DAMIAN improving, pre-renal from diarrhea.   Leukocytosis improving on zosyn for UTI, ucx growing kleb. No hx of resistant organisms, d/c on levaquin to complete 5 days of abx.  Time spent on d/c: 35 minutes

## 2021-09-13 NOTE — PROGRESS NOTE ADULT - PROBLEM SELECTOR PLAN 2
asymptomatic. UA large LE, + nitrite, 130 WBC  - c/w zosyn (started 9/11) until ucx, bcx return  - f/u UCx, BCx asymptomatic. UA large LE, + nitrite, 130 WBC  - Zosyn (started 9/11). Will begin Levaquin as outpatient as stated above  - UCx- klebsiella

## 2021-09-13 NOTE — PROGRESS NOTE ADULT - PROBLEM SELECTOR PLAN 3
baseline creatinine ~1.2. DAMIAN likely 2/2 hypovolemia from diarrhea, dec PO intake  s/p ED 1L LR, 1.8L NS  - downtrending Cr  - follow BMP  - continue LR 50/hr baseline creatinine ~1.2. DAMIAN likely 2/2 hypovolemia from diarrhea, dec PO intake  s/p ED 1L LR, 1.8L NS  - downtrending Cr. 1.53 this am

## 2021-09-13 NOTE — DISCHARGE NOTE NURSING/CASE MANAGEMENT/SOCIAL WORK - PATIENT PORTAL LINK FT
You can access the FollowMyHealth Patient Portal offered by [facility] by registering at the following website: [email or web address]. By joining [facility]’s FollowMyHealth portal, you will also be able to view your health information using other applications (apps) compatible with our system.

## 2021-09-15 LAB
CULTURE RESULTS: SIGNIFICANT CHANGE UP
SPECIMEN SOURCE: SIGNIFICANT CHANGE UP

## 2021-09-16 LAB
CULTURE RESULTS: SIGNIFICANT CHANGE UP
CULTURE RESULTS: SIGNIFICANT CHANGE UP
SPECIMEN SOURCE: SIGNIFICANT CHANGE UP
SPECIMEN SOURCE: SIGNIFICANT CHANGE UP

## 2021-09-20 ENCOUNTER — OUTPATIENT (OUTPATIENT)
Dept: OUTPATIENT SERVICES | Facility: HOSPITAL | Age: 71
LOS: 1 days | Discharge: HOME | End: 2021-09-20

## 2021-09-20 DIAGNOSIS — Z90.10 ACQUIRED ABSENCE OF UNSPECIFIED BREAST AND NIPPLE: Chronic | ICD-10-CM

## 2021-09-20 DIAGNOSIS — Z98.890 OTHER SPECIFIED POSTPROCEDURAL STATES: Chronic | ICD-10-CM

## 2021-09-20 DIAGNOSIS — C50.919 MALIGNANT NEOPLASM OF UNSPECIFIED SITE OF UNSPECIFIED FEMALE BREAST: ICD-10-CM

## 2021-09-20 DIAGNOSIS — N60.02 SOLITARY CYST OF LEFT BREAST: Chronic | ICD-10-CM

## 2021-09-20 DIAGNOSIS — E89.0 POSTPROCEDURAL HYPOTHYROIDISM: Chronic | ICD-10-CM

## 2021-09-23 ENCOUNTER — RESULT REVIEW (OUTPATIENT)
Age: 71
End: 2021-09-23

## 2021-09-23 ENCOUNTER — LABORATORY RESULT (OUTPATIENT)
Age: 71
End: 2021-09-23

## 2021-09-23 ENCOUNTER — APPOINTMENT (OUTPATIENT)
Dept: HEMATOLOGY ONCOLOGY | Facility: CLINIC | Age: 71
End: 2021-09-23
Payer: COMMERCIAL

## 2021-09-23 ENCOUNTER — APPOINTMENT (OUTPATIENT)
Dept: INFUSION THERAPY | Facility: HOSPITAL | Age: 71
End: 2021-09-23

## 2021-09-23 VITALS
RESPIRATION RATE: 18 BRPM | DIASTOLIC BLOOD PRESSURE: 96 MMHG | HEIGHT: 65.91 IN | HEART RATE: 94 BPM | WEIGHT: 131.81 LBS | TEMPERATURE: 97.7 F | OXYGEN SATURATION: 97 % | SYSTOLIC BLOOD PRESSURE: 154 MMHG | BODY MASS INDEX: 21.44 KG/M2

## 2021-09-23 LAB
BASOPHILS # BLD AUTO: 0.01 K/UL — SIGNIFICANT CHANGE UP (ref 0–0.2)
BASOPHILS NFR BLD AUTO: 0.2 % — SIGNIFICANT CHANGE UP (ref 0–2)
EOSINOPHIL # BLD AUTO: 0.01 K/UL — SIGNIFICANT CHANGE UP (ref 0–0.5)
EOSINOPHIL NFR BLD AUTO: 0.2 % — SIGNIFICANT CHANGE UP (ref 0–6)
HCT VFR BLD CALC: 30.1 % — LOW (ref 34.5–45)
HGB BLD-MCNC: 10 G/DL — LOW (ref 11.5–15.5)
IMM GRANULOCYTES NFR BLD AUTO: 1.4 % — SIGNIFICANT CHANGE UP (ref 0–1.5)
LYMPHOCYTES # BLD AUTO: 0.47 K/UL — LOW (ref 1–3.3)
LYMPHOCYTES # BLD AUTO: 11.2 % — LOW (ref 13–44)
MCHC RBC-ENTMCNC: 30.6 PG — SIGNIFICANT CHANGE UP (ref 27–34)
MCHC RBC-ENTMCNC: 33.2 G/DL — SIGNIFICANT CHANGE UP (ref 32–36)
MCV RBC AUTO: 92 FL — SIGNIFICANT CHANGE UP (ref 80–100)
MONOCYTES # BLD AUTO: 0.09 K/UL — SIGNIFICANT CHANGE UP (ref 0–0.9)
MONOCYTES NFR BLD AUTO: 2.1 % — SIGNIFICANT CHANGE UP (ref 2–14)
NEUTROPHILS # BLD AUTO: 3.56 K/UL — SIGNIFICANT CHANGE UP (ref 1.8–7.4)
NEUTROPHILS NFR BLD AUTO: 84.9 % — HIGH (ref 43–77)
NRBC # BLD: 0 /100 WBCS — SIGNIFICANT CHANGE UP (ref 0–0)
PLATELET # BLD AUTO: 546 K/UL — HIGH (ref 150–400)
RBC # BLD: 3.27 M/UL — LOW (ref 3.8–5.2)
RBC # FLD: 17.3 % — HIGH (ref 10.3–14.5)
WBC # BLD: 4.2 K/UL — SIGNIFICANT CHANGE UP (ref 3.8–10.5)
WBC # FLD AUTO: 4.2 K/UL — SIGNIFICANT CHANGE UP (ref 3.8–10.5)

## 2021-09-23 PROCEDURE — 99215 OFFICE O/P EST HI 40 MIN: CPT

## 2021-09-23 NOTE — REVIEW OF SYSTEMS
[Patient Intake Form Reviewed] : Patient intake form was reviewed [Negative] : Allergic/Immunologic [Abdominal Pain] : no abdominal pain [Vomiting] : no vomiting [Constipation] : no constipation [Diarrhea] : no diarrhea [FreeTextEntry2] : PCP Dr. Agnes Plunkett, last seen 9/2021. COVID vaccine 3/24 Pfizer [FreeTextEntry7] : + heartburn [FreeTextEntry8] : Dr. Gerard Tyson pap smear 2/2021, normal.  [FreeTextEntry9] : Osteoporosis, on prolia.

## 2021-09-23 NOTE — REASON FOR VISIT
[Follow-Up Visit] : a follow-up [Pre-Treatment Visit] : a pre-treatment [Initial Consultation] : an initial consultation [Other: _____] : [unfilled] [FreeTextEntry2] : breast cancer

## 2021-09-23 NOTE — HISTORY OF PRESENT ILLNESS
[de-identified] : Pt initially presented at age 63 in 2/2014 with right breast lump. Bilateral mammogram showed focal asymmetry in right lower outer quadrant with US demonstrating irregular mass measuring 2.8cm.  She had a right lumpectomy 4/2014 and ALND by Dr. Zunilda Yuen which showed a 2.6cm invasive ductal carcinoma grade 2 with LVI and PNI.  10/20 right axillary LN were positive for malignancy, +MARTI.  T2N3M0, stage IIIC.  ER 90%, HI 60%, HER2 negative IHC 0 staining however CISH was amplified (2.4).  Post op pt received adjuvant chemotherapy with ddAC-THP.  Radiation therapy with [facility].  She started letrozole 2.5mg daily which she continues to present time.\par \par She was clinically CHRIS until 5/19/21 when she went for screening mammo/US which showed an irregular focal asymmetry within the right upper outer quadrant which corresponds to irregular solid mass on concomitant US at right 10:00 10cmfn, biopsy rec, BIRADS 4C.\par \par Pt had a right breast 10:00 10cmfn US guided biopsy on 5/24/21 which showed invasive mammary carcinoma, mere score 6/9 (3=2+1), invasive tumor measures at least 0.3cm, no DCIS, ER 90%, HI 0%, HER2 negative 1+ IHC.\par \par Breast MRI on 5/27/2021 showed 0.9cm irregular mass in upper outer posterior right breast, corresponding to newly diagnosed malignancy; no evidence of multicentric or contralateral disease, BIRADS6.\par \par Pt underwent right breast mastectomy and prophylactic left breast mastectomy with Dr. Zunilda Yuen on 6/15/2021.  Left breast showed mild-proliferative fibrocystic changes.  Right breast mastectomy showed 10:00 invasive ductal carcinoma 6mm, Mere score 6/9 grade II (3+2+1), negative margins, ER 90%, HI 0%, HER2 negative IHC 1+,  pT1b pNx\par \par Oncotype score 28. \par \par Risk factors:Prior breast disease: as above.  Menarche: 14.  Postmenopausal at age 51.  Age of first pregnancy 21, two children, two pregnancies, no breast feeding. \par OCP 10 years, no other HRT. The patient is not of Ashkenazi ethnic background.  Family history is significant for maternal cousin age 35 breast cancer. Father had prostate cancer age 80.  Daughter with melanoma age 40.  maternal gpa lung cancer age 65, smoker.  Maternal aunt stomach cancer age 82.  Paternal gma unknown cancer age 30.  No ovary, endometrium, pancreatic cancer. Genetic testing [facility] 4/28/2019 VUS MELVA, NBN, F1.   [FreeTextEntry1] : C1 TC 7/22/21\par C2 TC 8/12/2021\par C3 TC 9/2/2021 \par C4 TC 9/23/2021 [de-identified] : Here for C4 of TC today. Patient was recently hospitalized at [facility] from 9/11/21-9/13/21 for diarrhea. Per discharge note review, diarrhea self-resolved and DAMIAN improved with fluids. Patient was found to have Klebsiella in urine culture, was given short course of Levaquin on discharge completed 2 days after discharge. \par No further diarrhea since the hospital stay. States that she still has persistent taste bud change and gets dehydrated easily and is trying to drink more fluids at home. Mild numbness and tingling in her fingers. Denies headaches, blurry vision, fevers or chills, or cough. States she gets winded easily.

## 2021-09-23 NOTE — ASSESSMENT
[FreeTextEntry1] : SHADIA RIVAS has a h/o right breast invasive ductal carcinoma in 2/2014, stage IIIC, T2N3M0, ER 90%, KS 60%, HER2 negative HER2 negative IHC 0 staining however CISH was amplified (2.4), s/p right lumpectomy 4/2014 and ALND, adjuvant chemotherapy with ddAC-THP, s/p radiation therapy, on letrozole until the present time.  Now with newly diagnosed right invasive ductal carcinoma, 6mm, ER 90%, KS 0%, HER2 negative, pT1b pNx.  s/p right breast mastectomy and prophylactic left breast mastectomy.  Oncotype 28.  Pt is on adjuvant chemotherapy with TC chemotherapy.  \par \par -CBC reviewed today, pt will receive C4 today. Tolerating well. \par -pt will see me in 3 weeks time to restart endocrine therapy with exemestane, will discontinue letrozole as pt developed new primary breast tumor on it. \par -if pt has fever of 100.4F or concerning symptoms, rec to call the office right away or go to the ER.\par -pt will continue to use dignicap during her treatment to help with alopecia.\par -Due for colonoscopy- will be scheduled for October 30, 2021. \par -I encouraged her to call me with any additional questions.

## 2021-09-23 NOTE — PHYSICAL EXAM
[Fully active, able to carry on all pre-disease performance without restriction] : Status 0 - Fully active, able to carry on all pre-disease performance without restriction [Normal] : affect appropriate [de-identified] : bilateral mastectomy scars, no chest wall masses, no axillary LAD b/l

## 2021-09-24 DIAGNOSIS — R11.2 NAUSEA WITH VOMITING, UNSPECIFIED: ICD-10-CM

## 2021-09-24 DIAGNOSIS — Z51.89 ENCOUNTER FOR OTHER SPECIFIED AFTERCARE: ICD-10-CM

## 2021-09-24 DIAGNOSIS — Z51.11 ENCOUNTER FOR ANTINEOPLASTIC CHEMOTHERAPY: ICD-10-CM

## 2021-10-05 ENCOUNTER — APPOINTMENT (OUTPATIENT)
Dept: HEMATOLOGY ONCOLOGY | Facility: CLINIC | Age: 71
End: 2021-10-05
Payer: COMMERCIAL

## 2021-10-05 ENCOUNTER — NON-APPOINTMENT (OUTPATIENT)
Age: 71
End: 2021-10-05

## 2021-10-05 PROCEDURE — 99213 OFFICE O/P EST LOW 20 MIN: CPT | Mod: 95

## 2021-10-05 NOTE — REASON FOR VISIT
[Home] : at home, [unfilled] , at the time of the visit. [Medical Office: ([facility])___] : at the medical office located in  [Verbal consent obtained from patient] : the patient, [unfilled] [Follow-Up Visit] : a follow-up [Pre-Treatment Visit] : a pre-treatment [Initial Consultation] : an initial consultation [Other: _____] : [unfilled] [FreeTextEntry2] : breast cancer

## 2021-10-05 NOTE — REASON FOR VISIT
[Home] : at home, [unfilled] , at the time of the visit. [Medical Office: ([facility])___] : at the medical office located in  [Verbal consent obtained from patient] : the patient, [unfilled] [Follow-Up Visit] : a follow-up [Pre-Treatment Visit] : a pre-treatment [Initial Consultation] : an initial consultation [Other: _____] : [unfilled] [FreeTextEntry2] : breast cancer

## 2021-10-14 ENCOUNTER — APPOINTMENT (OUTPATIENT)
Dept: HEMATOLOGY ONCOLOGY | Facility: CLINIC | Age: 71
End: 2021-10-14
Payer: COMMERCIAL

## 2021-10-14 ENCOUNTER — RESULT REVIEW (OUTPATIENT)
Age: 71
End: 2021-10-14

## 2021-10-14 VITALS
HEART RATE: 91 BPM | BODY MASS INDEX: 23.69 KG/M2 | RESPIRATION RATE: 16 BRPM | WEIGHT: 128.75 LBS | SYSTOLIC BLOOD PRESSURE: 117 MMHG | HEIGHT: 62 IN | DIASTOLIC BLOOD PRESSURE: 75 MMHG | OXYGEN SATURATION: 99 % | TEMPERATURE: 97.2 F

## 2021-10-14 DIAGNOSIS — T45.1X5A ANEMIA DUE TO ANTINEOPLASTIC CHEMOTHERAPY: ICD-10-CM

## 2021-10-14 DIAGNOSIS — T45.1X5A OTHER FATIGUE: ICD-10-CM

## 2021-10-14 DIAGNOSIS — L03.114 CELLULITIS OF LEFT UPPER LIMB: ICD-10-CM

## 2021-10-14 DIAGNOSIS — D64.81 ANEMIA DUE TO ANTINEOPLASTIC CHEMOTHERAPY: ICD-10-CM

## 2021-10-14 DIAGNOSIS — R53.83 OTHER FATIGUE: ICD-10-CM

## 2021-10-14 LAB
BASOPHILS # BLD AUTO: 0.27 K/UL — HIGH (ref 0–0.2)
BASOPHILS NFR BLD AUTO: 4 % — HIGH (ref 0–2)
EOSINOPHIL # BLD AUTO: 0.07 K/UL — SIGNIFICANT CHANGE UP (ref 0–0.5)
EOSINOPHIL NFR BLD AUTO: 1 % — SIGNIFICANT CHANGE UP (ref 0–6)
HCT VFR BLD CALC: 33.3 % — LOW (ref 34.5–45)
HGB BLD-MCNC: 10.6 G/DL — LOW (ref 11.5–15.5)
LYMPHOCYTES # BLD AUTO: 1.15 K/UL — SIGNIFICANT CHANGE UP (ref 1–3.3)
LYMPHOCYTES # BLD AUTO: 17 % — SIGNIFICANT CHANGE UP (ref 13–44)
MCHC RBC-ENTMCNC: 31.2 PG — SIGNIFICANT CHANGE UP (ref 27–34)
MCHC RBC-ENTMCNC: 31.8 G/DL — LOW (ref 32–36)
MCV RBC AUTO: 97.9 FL — SIGNIFICANT CHANGE UP (ref 80–100)
MONOCYTES # BLD AUTO: 1.22 K/UL — HIGH (ref 0–0.9)
MONOCYTES NFR BLD AUTO: 18 % — HIGH (ref 2–14)
MYELOCYTES NFR BLD: 2 % — HIGH (ref 0–0)
NEUTROPHILS # BLD AUTO: 3.94 K/UL — SIGNIFICANT CHANGE UP (ref 1.8–7.4)
NEUTROPHILS NFR BLD AUTO: 58 % — SIGNIFICANT CHANGE UP (ref 43–77)
NRBC # BLD: 0 /100 — SIGNIFICANT CHANGE UP (ref 0–0)
NRBC # BLD: SIGNIFICANT CHANGE UP /100 WBCS (ref 0–0)
PLAT MORPH BLD: NORMAL — SIGNIFICANT CHANGE UP
PLATELET # BLD AUTO: 460 K/UL — HIGH (ref 150–400)
RBC # BLD: 3.4 M/UL — LOW (ref 3.8–5.2)
RBC # FLD: 18 % — HIGH (ref 10.3–14.5)
RBC BLD AUTO: SIGNIFICANT CHANGE UP
WBC # BLD: 6.79 K/UL — SIGNIFICANT CHANGE UP (ref 3.8–10.5)
WBC # FLD AUTO: 6.79 K/UL — SIGNIFICANT CHANGE UP (ref 3.8–10.5)

## 2021-10-14 PROCEDURE — 99214 OFFICE O/P EST MOD 30 MIN: CPT

## 2021-10-14 NOTE — HISTORY OF PRESENT ILLNESS
[de-identified] : Pt initially presented at age 63 in 2/2014 with right breast lump. Bilateral mammogram showed focal asymmetry in right lower outer quadrant with US demonstrating irregular mass measuring 2.8cm.  She had a right lumpectomy 4/2014 and ALND by Dr. Zunilda Yuen which showed a 2.6cm invasive ductal carcinoma grade 2 with LVI and PNI.  10/20 right axillary LN were positive for malignancy, +MARTI.  T2N3M0, stage IIIC.  ER 90%, MS 60%, HER2 negative IHC 0 staining however CISH was amplified (2.4).  Post op pt received adjuvant chemotherapy with ddAC-THP.  Radiation therapy with [facility].  She started letrozole 2.5mg daily which she continues to present time.\par \par She was clinically CHRIS until 5/19/21 when she went for screening mammo/US which showed an irregular focal asymmetry within the right upper outer quadrant which corresponds to irregular solid mass on concomitant US at right 10:00 10cmfn, biopsy rec, BIRADS 4C.\par \par Pt had a right breast 10:00 10cmfn US guided biopsy on 5/24/21 which showed invasive mammary carcinoma, mere score 6/9 (3=2+1), invasive tumor measures at least 0.3cm, no DCIS, ER 90%, MS 0%, HER2 negative 1+ IHC.\par \par Breast MRI on 5/27/2021 showed 0.9cm irregular mass in upper outer posterior right breast, corresponding to newly diagnosed malignancy; no evidence of multicentric or contralateral disease, BIRADS6.\par \par Pt underwent right breast mastectomy and prophylactic left breast mastectomy with Dr. Zunilda Yuen on 6/15/2021.  Left breast showed mild-proliferative fibrocystic changes.  Right breast mastectomy showed 10:00 invasive ductal carcinoma 6mm, Mere score 6/9 grade II (3+2+1), negative margins, ER 90%, MS 0%, HER2 negative IHC 1+,  pT1b pNx\par \par Oncotype score 28. \par \par Risk factors:Prior breast disease: as above.  Menarche: 14.  Postmenopausal at age 51.  Age of first pregnancy 21, two children, two pregnancies, no breast feeding. \par OCP 10 years, no other HRT. The patient is not of Ashkenazi ethnic background.  Family history is significant for maternal cousin age 35 breast cancer. Father had prostate cancer age 80.  Daughter with melanoma age 40.  maternal gpa lung cancer age 65, smoker.  Maternal aunt stomach cancer age 82.  Paternal gma unknown cancer age 30.  No ovary, endometrium, pancreatic cancer. Genetic testing [facility] 4/28/2019 VUS MELVA, NBN, F1.   [FreeTextEntry1] : C1 TC 7/22/21\par C2 TC 8/12/2021\par C3 TC 9/2/2021 \par C4 TC 9/23/2021 [de-identified] : 10/14/21\par Patient presents today to rule out metastatic breast cancer and assess treatment toxicity and discuss ongoing treatment plan. \par Patient has completed her 4 cycle course of TC on 9/23/21.  \par She notes residual fatigue, some dyspnea when walking up stairs only, taste changes.\par Patient denies any SOB, CP, abdominal pain, bone pain or headache.\par Patient denies fever, chills, nausea/vomiting, diarrhea/constipation, headache.\par \par Patient was seen by telehealth 10/5/21 for a cellulitis of left hand which appeared to be secondary to a possible infiltration of the taxotere on her last treatment.  She completed a course of Keflex and noted the erythema to have significantly improved but with residual skin discoloration noted; no fevers, no chills, no tenderness. \par  today because of redness of her left hand which started about 5 days ago but seems to be getting worse. \par \par Patient was also receiving Prolia at [facility] for osteoporosis which had improved to osteopenia while on treatment.  Her last dose was 4/14/21.\par \par Bone Density:  July 2020 reviewed\par Colonoscopy: scheduled 10/2021\par

## 2021-10-14 NOTE — REVIEW OF SYSTEMS
[Negative] : Allergic/Immunologic [Abdominal Pain] : no abdominal pain [Vomiting] : no vomiting [Constipation] : no constipation [Diarrhea] : no diarrhea [Skin Rash] : skin rash [FreeTextEntry2] : PCP Dr. Agnes Plunkett, last seen 9/2021. COVID vaccine 3/24 Pfizer - booster scheduled  [FreeTextEntry7] : + heartburn [FreeTextEntry8] : Dr. Gerard Tyson pap smear 2/2021, normal.  [FreeTextEntry9] : Osteoporosis, on prolia.

## 2021-10-14 NOTE — ASSESSMENT
[FreeTextEntry1] : SHADIA RIVAS has a h/o right breast invasive ductal carcinoma in 2/2014, stage IIIC, T2N3M0, ER 90%, AR 60%, HER2 negative HER2 negative IHC 0 staining however CISH was amplified (2.4), s/p right lumpectomy 4/2014 and ALND, adjuvant chemotherapy with ddAC-THP, s/p radiation therapy, on letrozole until the present time.  Now with newly diagnosed right invasive ductal carcinoma, 6mm, ER 90%, AR 0%, HER2 negative, pT1b pNx.  s/p right breast mastectomy and prophylactic left breast mastectomy.  Oncotype 28.  Pt is on adjuvant chemotherapy with TC chemotherapy and completed 4 cycles on 9/23/21\par \par - CHRIS 7 years from Her2 positive CA in 2014; CHRIS x 6 months for new primary\par - start exemestane 25 mg po q day on 11/1/2021 - reviewed side effects w/ patient including arthralgias, hotflashes, vaginal dryness.  Patient agrees w/ ongoing therapy\par - bw to be drawn today\par \par Osteoporosis/osteopenia - improved on Prolia\par - since patient is resuming AI will continue prolia which is due at this time.  Patient will schedule.\par \par Colon Cancer Screening\par - scheduled 10/21\par \par Cellulitis - resolved - no further therapy warranted\par \par F/up in 3 months w/ Dr. Berumen.\par Patient had an opportunity to have her questions asked and answered to her satisfaction.\par \par \par \par \par \par \par - Cellulitis of left hand noted likely due to previous IV insertion; possible extravasation of chemotherapy? \par -I encouraged her to call me with any additional questions.  [Curative] : Goals of care discussed with patient: Curative

## 2021-10-14 NOTE — PHYSICAL EXAM
[Restricted in physically strenuous activity but ambulatory and able to carry out work of a light or sedentary nature] : Status 1- Restricted in physically strenuous activity but ambulatory and able to carry out work of a light or sedentary nature, e.g., light house work, office work [Normal] : affect appropriate [de-identified] : s/p b/l mastectomy without reconstruction; no chest wall masses, no skin changes; prominence of right upper ribs (unchanged from childhood) no palpable adenopathy  [de-identified] : left dorsum of hand large area of erythema resolved with residual hyperpigmentation of skin. , no bullae, no ulceration or blisters,

## 2021-10-15 LAB
25(OH)D3 SERPL-MCNC: 36.4 NG/ML
ALBUMIN SERPL ELPH-MCNC: 4.2 G/DL
ALP BLD-CCNC: 52 U/L
ALT SERPL-CCNC: 21 U/L
ANION GAP SERPL CALC-SCNC: 12 MMOL/L
AST SERPL-CCNC: 24 U/L
BILIRUB SERPL-MCNC: 0.3 MG/DL
BUN SERPL-MCNC: 18 MG/DL
CALCIUM SERPL-MCNC: 9.7 MG/DL
CHLORIDE SERPL-SCNC: 105 MMOL/L
CO2 SERPL-SCNC: 26 MMOL/L
CREAT SERPL-MCNC: 0.79 MG/DL
GLUCOSE SERPL-MCNC: 96 MG/DL
POTASSIUM SERPL-SCNC: 4.4 MMOL/L
PROT SERPL-MCNC: 6.3 G/DL
SODIUM SERPL-SCNC: 142 MMOL/L

## 2021-10-18 LAB
CHOLEST SERPL-MCNC: 232 MG/DL
HDLC SERPL-MCNC: 49 MG/DL
LDLC SERPL CALC-MCNC: 153 MG/DL
NONHDLC SERPL-MCNC: 183 MG/DL
TRIGL SERPL-MCNC: 152 MG/DL

## 2021-10-18 NOTE — ASSESSMENT
[FreeTextEntry1] : SHADIA RIVAS has a h/o right breast invasive ductal carcinoma in 2/2014, stage IIIC, T2N3M0, ER 90%, HI 60%, HER2 negative HER2 negative IHC 0 staining however CISH was amplified (2.4), s/p right lumpectomy 4/2014 and ALND, adjuvant chemotherapy with ddAC-THP, s/p radiation therapy, on letrozole until the present time.  Now with newly diagnosed right invasive ductal carcinoma, 6mm, ER 90%, HI 0%, HER2 negative, pT1b pNx.  s/p right breast mastectomy and prophylactic left breast mastectomy.  Oncotype 28.  Pt is on adjuvant chemotherapy with TC chemotherapy.  \par \par - Cellulitis of left hand noted likely due to previous IV insertion; possible extravasation of chemotherapy? \par -  Keflex 500 mg bid x 7 days\par - f/up within 48 hours if no improvement in erythema\par - no application of ice or heat recommended\par - reviewed literature on extravasations of taxotere which have been reported, but specific antidote or treatment advised 10 days after initial treatment. \par - if pt has fever of 100.4F or concerning symptoms, rec to call the office right away or go to the ER.\par \par \par -I encouraged her to call me with any additional questions.

## 2021-10-18 NOTE — HISTORY OF PRESENT ILLNESS
[de-identified] : Pt initially presented at age 63 in 2/2014 with right breast lump. Bilateral mammogram showed focal asymmetry in right lower outer quadrant with US demonstrating irregular mass measuring 2.8cm.  She had a right lumpectomy 4/2014 and ALND by Dr. Zunilda Yuen which showed a 2.6cm invasive ductal carcinoma grade 2 with LVI and PNI.  10/20 right axillary LN were positive for malignancy, +MARTI.  T2N3M0, stage IIIC.  ER 90%, NC 60%, HER2 negative IHC 0 staining however CISH was amplified (2.4).  Post op pt received adjuvant chemotherapy with ddAC-THP.  Radiation therapy with [facility].  She started letrozole 2.5mg daily which she continues to present time.\par \par She was clinically CHRIS until 5/19/21 when she went for screening mammo/US which showed an irregular focal asymmetry within the right upper outer quadrant which corresponds to irregular solid mass on concomitant US at right 10:00 10cmfn, biopsy rec, BIRADS 4C.\par \par Pt had a right breast 10:00 10cmfn US guided biopsy on 5/24/21 which showed invasive mammary carcinoma, mere score 6/9 (3=2+1), invasive tumor measures at least 0.3cm, no DCIS, ER 90%, NC 0%, HER2 negative 1+ IHC.\par \par Breast MRI on 5/27/2021 showed 0.9cm irregular mass in upper outer posterior right breast, corresponding to newly diagnosed malignancy; no evidence of multicentric or contralateral disease, BIRADS6.\par \par Pt underwent right breast mastectomy and prophylactic left breast mastectomy with Dr. Zunilda Yuen on 6/15/2021.  Left breast showed mild-proliferative fibrocystic changes.  Right breast mastectomy showed 10:00 invasive ductal carcinoma 6mm, Mere score 6/9 grade II (3+2+1), negative margins, ER 90%, NC 0%, HER2 negative IHC 1+,  pT1b pNx\par \par Oncotype score 28. \par \par Risk factors:Prior breast disease: as above.  Menarche: 14.  Postmenopausal at age 51.  Age of first pregnancy 21, two children, two pregnancies, no breast feeding. \par OCP 10 years, no other HRT. The patient is not of Ashkenazi ethnic background.  Family history is significant for maternal cousin age 35 breast cancer. Father had prostate cancer age 80.  Daughter with melanoma age 40.  maternal gpa lung cancer age 65, smoker.  Maternal aunt stomach cancer age 82.  Paternal gma unknown cancer age 30.  No ovary, endometrium, pancreatic cancer. Genetic testing [facility] 4/28/2019 VUS MELVA, NBN, F1.   [FreeTextEntry1] : C1 TC 7/22/21\par C2 TC 8/12/2021\par C3 TC 9/2/2021 \par C4 TC 9/23/2021 [de-identified] : 10/5/21\par Patient seen by telehealth today because of redness of her left hand which started about 5 days ago but seems to be getting worse.  Patient had treatement with Taxotere and Cytoxan on 9/23/21; a peripheral line was started in the dorsum of the left hand; at the site of insertion of IV, patient noticed erythema and erythema spreading up the enings of her left forearm.  She denies any fever, chills.  She states the area is not very warm or tender to touch.  Denies any blistering of hands or purulent drainage.\par Patient does not recall any burning or discomfort at the time of infusion.  Reviewed nursing note from treatment day and no mention of any issues w/ IV.

## 2021-10-18 NOTE — PHYSICAL EXAM
[Normal] : well developed, well nourished, in no acute distress [de-identified] : left dorsum of hand large area of erythema, no bullae, no ulceration or blisters, mild erythema following track of vein in forearm without surrounding erythema.

## 2021-10-18 NOTE — ASSESSMENT
[FreeTextEntry1] : SHADIA RIVAS has a h/o right breast invasive ductal carcinoma in 2/2014, stage IIIC, T2N3M0, ER 90%, NE 60%, HER2 negative HER2 negative IHC 0 staining however CISH was amplified (2.4), s/p right lumpectomy 4/2014 and ALND, adjuvant chemotherapy with ddAC-THP, s/p radiation therapy, on letrozole until the present time.  Now with newly diagnosed right invasive ductal carcinoma, 6mm, ER 90%, NE 0%, HER2 negative, pT1b pNx.  s/p right breast mastectomy and prophylactic left breast mastectomy.  Oncotype 28.  Pt is on adjuvant chemotherapy with TC chemotherapy.  \par \par - Cellulitis of left hand noted likely due to previous IV insertion; possible extravasation of chemotherapy? \par -  Keflex 500 mg bid x 7 days\par - f/up within 48 hours if no improvement in erythema\par - no application of ice or heat recommended\par - reviewed literature on extravasations of taxotere which have been reported, but specific antidote or treatment advised 10 days after initial treatment. \par - if pt has fever of 100.4F or concerning symptoms, rec to call the office right away or go to the ER.\par \par \par -I encouraged her to call me with any additional questions.

## 2021-10-18 NOTE — PHYSICAL EXAM
[Normal] : well developed, well nourished, in no acute distress [de-identified] : left dorsum of hand large area of erythema, no bullae, no ulceration or blisters, mild erythema following track of vein in forearm without surrounding erythema.

## 2021-11-05 ENCOUNTER — NON-APPOINTMENT (OUTPATIENT)
Age: 71
End: 2021-11-05

## 2021-11-05 ENCOUNTER — OUTPATIENT (OUTPATIENT)
Dept: OUTPATIENT SERVICES | Facility: HOSPITAL | Age: 71
LOS: 1 days | Discharge: HOME | End: 2021-11-05

## 2021-11-05 DIAGNOSIS — N60.02 SOLITARY CYST OF LEFT BREAST: Chronic | ICD-10-CM

## 2021-11-05 DIAGNOSIS — C50.919 MALIGNANT NEOPLASM OF UNSPECIFIED SITE OF UNSPECIFIED FEMALE BREAST: ICD-10-CM

## 2021-11-05 DIAGNOSIS — E89.0 POSTPROCEDURAL HYPOTHYROIDISM: Chronic | ICD-10-CM

## 2021-11-05 DIAGNOSIS — Z98.890 OTHER SPECIFIED POSTPROCEDURAL STATES: Chronic | ICD-10-CM

## 2021-11-05 DIAGNOSIS — Z90.10 ACQUIRED ABSENCE OF UNSPECIFIED BREAST AND NIPPLE: Chronic | ICD-10-CM

## 2021-11-16 ENCOUNTER — RESULT REVIEW (OUTPATIENT)
Age: 71
End: 2021-11-16

## 2021-11-16 ENCOUNTER — APPOINTMENT (OUTPATIENT)
Dept: INFUSION THERAPY | Facility: HOSPITAL | Age: 71
End: 2021-11-16

## 2021-11-16 LAB
BASOPHILS # BLD AUTO: 0.07 K/UL — SIGNIFICANT CHANGE UP (ref 0–0.2)
BASOPHILS NFR BLD AUTO: 1 % — SIGNIFICANT CHANGE UP (ref 0–2)
EOSINOPHIL # BLD AUTO: 0.21 K/UL — SIGNIFICANT CHANGE UP (ref 0–0.5)
EOSINOPHIL NFR BLD AUTO: 3 % — SIGNIFICANT CHANGE UP (ref 0–6)
HCT VFR BLD CALC: 37 % — SIGNIFICANT CHANGE UP (ref 34.5–45)
HGB BLD-MCNC: 12.6 G/DL — SIGNIFICANT CHANGE UP (ref 11.5–15.5)
IMM GRANULOCYTES NFR BLD AUTO: 1.3 % — SIGNIFICANT CHANGE UP (ref 0–1.5)
LYMPHOCYTES # BLD AUTO: 1.48 K/UL — SIGNIFICANT CHANGE UP (ref 1–3.3)
LYMPHOCYTES # BLD AUTO: 21.4 % — SIGNIFICANT CHANGE UP (ref 13–44)
MCHC RBC-ENTMCNC: 32.2 PG — SIGNIFICANT CHANGE UP (ref 27–34)
MCHC RBC-ENTMCNC: 34.1 G/DL — SIGNIFICANT CHANGE UP (ref 32–36)
MCV RBC AUTO: 94.6 FL — SIGNIFICANT CHANGE UP (ref 80–100)
MONOCYTES # BLD AUTO: 0.77 K/UL — SIGNIFICANT CHANGE UP (ref 0–0.9)
MONOCYTES NFR BLD AUTO: 11.1 % — SIGNIFICANT CHANGE UP (ref 2–14)
NEUTROPHILS # BLD AUTO: 4.3 K/UL — SIGNIFICANT CHANGE UP (ref 1.8–7.4)
NEUTROPHILS NFR BLD AUTO: 62.2 % — SIGNIFICANT CHANGE UP (ref 43–77)
NRBC # BLD: 0 /100 WBCS — SIGNIFICANT CHANGE UP (ref 0–0)
PLATELET # BLD AUTO: 334 K/UL — SIGNIFICANT CHANGE UP (ref 150–400)
RBC # BLD: 3.91 M/UL — SIGNIFICANT CHANGE UP (ref 3.8–5.2)
RBC # FLD: 13 % — SIGNIFICANT CHANGE UP (ref 10.3–14.5)
WBC # BLD: 6.92 K/UL — SIGNIFICANT CHANGE UP (ref 3.8–10.5)
WBC # FLD AUTO: 6.92 K/UL — SIGNIFICANT CHANGE UP (ref 3.8–10.5)

## 2021-11-17 DIAGNOSIS — C79.51 SECONDARY MALIGNANT NEOPLASM OF BONE: ICD-10-CM

## 2021-12-05 ENCOUNTER — OUTPATIENT (OUTPATIENT)
Dept: OUTPATIENT SERVICES | Facility: HOSPITAL | Age: 71
LOS: 1 days | Discharge: HOME | End: 2021-12-05

## 2021-12-05 DIAGNOSIS — E89.0 POSTPROCEDURAL HYPOTHYROIDISM: Chronic | ICD-10-CM

## 2021-12-05 DIAGNOSIS — N60.02 SOLITARY CYST OF LEFT BREAST: Chronic | ICD-10-CM

## 2021-12-05 DIAGNOSIS — Z90.10 ACQUIRED ABSENCE OF UNSPECIFIED BREAST AND NIPPLE: Chronic | ICD-10-CM

## 2021-12-05 DIAGNOSIS — C50.919 MALIGNANT NEOPLASM OF UNSPECIFIED SITE OF UNSPECIFIED FEMALE BREAST: ICD-10-CM

## 2021-12-05 DIAGNOSIS — Z98.890 OTHER SPECIFIED POSTPROCEDURAL STATES: Chronic | ICD-10-CM

## 2021-12-07 ENCOUNTER — APPOINTMENT (OUTPATIENT)
Dept: HEMATOLOGY ONCOLOGY | Facility: CLINIC | Age: 71
End: 2021-12-07
Payer: COMMERCIAL

## 2021-12-07 VITALS
SYSTOLIC BLOOD PRESSURE: 149 MMHG | BODY MASS INDEX: 22.83 KG/M2 | TEMPERATURE: 98 F | WEIGHT: 125.66 LBS | HEIGHT: 62.01 IN | RESPIRATION RATE: 16 BRPM | HEART RATE: 84 BPM | DIASTOLIC BLOOD PRESSURE: 68 MMHG | OXYGEN SATURATION: 98 %

## 2021-12-07 PROCEDURE — 99214 OFFICE O/P EST MOD 30 MIN: CPT

## 2021-12-07 RX ORDER — LOSARTAN POTASSIUM 50 MG/1
50 TABLET, FILM COATED ORAL DAILY
Qty: 30 | Refills: 0 | Status: ACTIVE | COMMUNITY
Start: 2021-12-07

## 2021-12-08 ENCOUNTER — APPOINTMENT (OUTPATIENT)
Dept: SURGERY | Facility: CLINIC | Age: 71
End: 2021-12-08
Payer: COMMERCIAL

## 2021-12-08 PROCEDURE — 99213K: CUSTOM

## 2021-12-08 NOTE — PHYSICAL EXAM
[Restricted in physically strenuous activity but ambulatory and able to carry out work of a light or sedentary nature] : Status 1- Restricted in physically strenuous activity but ambulatory and able to carry out work of a light or sedentary nature, e.g., light house work, office work [Normal] : affect appropriate [de-identified] : s/p b/l mastectomy without reconstruction; no chest wall masses, no skin changes; prominence of right upper ribs (unchanged from childhood) no palpable adenopathy  [de-identified] : left dorsum of hand large area of erythema resolved with residual hyperpigmentation of skin. , no bullae, no ulceration or blisters,

## 2021-12-08 NOTE — ASSESSMENT
[Curative] : Goals of care discussed with patient: Curative [FreeTextEntry1] : SHADIA RIVAS has a h/o right breast invasive ductal carcinoma in 2/2014, stage IIIC, T2N3M0, ER 90%, VT 60%, HER2 negative HER2 negative IHC 0 staining however CISH was amplified (2.4), s/p right lumpectomy 4/2014 and ALND, adjuvant chemotherapy with ddAC-THP, s/p radiation therapy, on letrozole until the present time.  Now with newly diagnosed right invasive ductal carcinoma, 6mm, ER 90%, VT 0%, HER2 negative, pT1b pNx.  s/p right breast mastectomy and prophylactic left breast mastectomy.  Oncotype 28.  Pt completed adjuvant chemotherapy with TC chemotherapy on 9/23/21.\par \par -CHRIS 7 years from Her2 positive CA in 2014; CHRIS x 6 months for new primary\par -started exemestane on 11/1/2021, tolerating well.\par -continue to fu with Dr. Yuen\par -labs reviewed from 11/16/21\par -continue prolia q6mos for osteoporosis\par -FU in 3mos

## 2021-12-08 NOTE — REVIEW OF SYSTEMS
[Skin Rash] : skin rash [Negative] : Allergic/Immunologic [Abdominal Pain] : no abdominal pain [Vomiting] : no vomiting [Constipation] : no constipation [Diarrhea] : no diarrhea [FreeTextEntry2] : PCP Dr. Agnes Plunkett, last seen 9/2021. COVID vaccine 3/24 Pfizer - booster scheduled  [FreeTextEntry7] : + heartburn [FreeTextEntry8] : Dr. Gerard Tyson pap smear 2/2021, normal.  [FreeTextEntry9] : Osteoporosis, on prolia.

## 2021-12-08 NOTE — HISTORY OF PRESENT ILLNESS
[de-identified] : Pt initially presented at age 63 in 2/2014 with right breast lump. Bilateral mammogram showed focal asymmetry in right lower outer quadrant with US demonstrating irregular mass measuring 2.8cm.  She had a right lumpectomy 4/2014 and ALND by Dr. Zunilda Yuen which showed a 2.6cm invasive ductal carcinoma grade 2 with LVI and PNI.  10/20 right axillary LN were positive for malignancy, +MARTI.  T2N3M0, stage IIIC.  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  Post op pt received adjuvant chemotherapy with ddAC-THP.  Radiation therapy with [facility].  She started letrozole 2.5mg daily which she continues to present time.\par \par She was clinically CHRIS until 5/19/21 when she went for screening mammo/US which showed an irregular focal asymmetry within the right upper outer quadrant which corresponds to irregular solid mass on concomitant US at right 10:00 10cmfn, biopsy rec, BIRADS 4C.\par \par Pt had a right breast 10:00 10cmfn US guided biopsy on 5/24/21 which showed invasive mammary carcinoma, mere score 6/9 (3=2+1), invasive tumor measures at least 0.3cm, no DCIS, ER 90%, IL 0%, HER2 negative 1+ IHC.\par \par Breast MRI on 5/27/2021 showed 0.9cm irregular mass in upper outer posterior right breast, corresponding to newly diagnosed malignancy; no evidence of multicentric or contralateral disease, BIRADS6.\par \par Pt underwent right breast mastectomy and prophylactic left breast mastectomy with Dr. Zunilda Yuen on 6/15/2021.  Left breast showed mild-proliferative fibrocystic changes.  Right breast mastectomy showed 10:00 invasive ductal carcinoma 6mm, Mere score 6/9 grade II (3+2+1), negative margins, ER 90%, IL 0%, HER2 negative IHC 1+,  pT1b pNx\par \par Oncotype score 28. \par \par Patient has completed her 4 cycle course of adjuvant chemotherapy with taxotere and cytoxan on 9/23/21.  \par \par Risk factors:Prior breast disease: as above.  Menarche: 14.  Postmenopausal at age 51.  Age of first pregnancy 21, two children, two pregnancies, no breast feeding. \par OCP 10 years, no other HRT. The patient is not of Ashkenazi ethnic background.  Family history is significant for maternal cousin age 35 breast cancer. Father had prostate cancer age 80.  Daughter with melanoma age 40.  maternal gpa lung cancer age 65, smoker.  Maternal aunt stomach cancer age 82.  Paternal gma unknown cancer age 30.  No ovary, endometrium, pancreatic cancer. Genetic testing INV[facility]AE 4/28/2019 VUS MELVA, NBN, NF1.   [FreeTextEntry1] : TC completed 9/23/21 [de-identified] : \par started exemestane 25 mg po q day on 11/1/2021\par elevated BP since being on AI, back on losartan 25mg daily\par Also with hair thinning will try biotin\par denies chest wall masses.\par Seeing breast surgeon Dr. Yuen 12/8/21\par Labs today\par \par HEALTH MAINTENANCE \par PCP Dr. Agnes Plunkett, seen last month \par Bone Density: July 2020 reviewed, Sang at [facility] for osteoporosis which had improved to osteopenia while on treatment.  Her last dose was 11/16/21.\par Gastrointestinal: Dr. Alvarez, Colonoscopy 9/2018 - 2 polyps, repeat in 3 years. \par GYN Dr. Gerard Tyson pap smear 2/2021, normal. no vaginal bleeding or spotting\par COVID vaccine 3/24 Pfizer.

## 2021-12-15 ENCOUNTER — NON-APPOINTMENT (OUTPATIENT)
Age: 71
End: 2021-12-15

## 2022-02-11 ENCOUNTER — NON-APPOINTMENT (OUTPATIENT)
Age: 72
End: 2022-02-11

## 2022-03-03 ENCOUNTER — OUTPATIENT (OUTPATIENT)
Dept: OUTPATIENT SERVICES | Facility: HOSPITAL | Age: 72
LOS: 1 days | Discharge: HOME | End: 2022-03-03

## 2022-03-03 DIAGNOSIS — Z90.10 ACQUIRED ABSENCE OF UNSPECIFIED BREAST AND NIPPLE: Chronic | ICD-10-CM

## 2022-03-03 DIAGNOSIS — Z98.890 OTHER SPECIFIED POSTPROCEDURAL STATES: Chronic | ICD-10-CM

## 2022-03-03 DIAGNOSIS — C50.919 MALIGNANT NEOPLASM OF UNSPECIFIED SITE OF UNSPECIFIED FEMALE BREAST: ICD-10-CM

## 2022-03-03 DIAGNOSIS — E89.0 POSTPROCEDURAL HYPOTHYROIDISM: Chronic | ICD-10-CM

## 2022-03-03 DIAGNOSIS — N60.02 SOLITARY CYST OF LEFT BREAST: Chronic | ICD-10-CM

## 2022-03-04 ENCOUNTER — RESULT REVIEW (OUTPATIENT)
Age: 72
End: 2022-03-04

## 2022-03-04 ENCOUNTER — APPOINTMENT (OUTPATIENT)
Dept: HEMATOLOGY ONCOLOGY | Facility: CLINIC | Age: 72
End: 2022-03-04
Payer: COMMERCIAL

## 2022-03-04 VITALS
DIASTOLIC BLOOD PRESSURE: 69 MMHG | OXYGEN SATURATION: 96 % | BODY MASS INDEX: 23.23 KG/M2 | WEIGHT: 127.87 LBS | HEART RATE: 80 BPM | RESPIRATION RATE: 16 BRPM | TEMPERATURE: 97.2 F | SYSTOLIC BLOOD PRESSURE: 142 MMHG | HEIGHT: 62.01 IN

## 2022-03-04 LAB
BASOPHILS # BLD AUTO: 0.07 K/UL — SIGNIFICANT CHANGE UP (ref 0–0.2)
BASOPHILS NFR BLD AUTO: 0.9 % — SIGNIFICANT CHANGE UP (ref 0–2)
EOSINOPHIL # BLD AUTO: 0.15 K/UL — SIGNIFICANT CHANGE UP (ref 0–0.5)
EOSINOPHIL NFR BLD AUTO: 1.9 % — SIGNIFICANT CHANGE UP (ref 0–6)
HCT VFR BLD CALC: 40.3 % — SIGNIFICANT CHANGE UP (ref 34.5–45)
HGB BLD-MCNC: 13.2 G/DL — SIGNIFICANT CHANGE UP (ref 11.5–15.5)
IMM GRANULOCYTES NFR BLD AUTO: 0.1 % — SIGNIFICANT CHANGE UP (ref 0–1.5)
LYMPHOCYTES # BLD AUTO: 1.92 K/UL — SIGNIFICANT CHANGE UP (ref 1–3.3)
LYMPHOCYTES # BLD AUTO: 24.8 % — SIGNIFICANT CHANGE UP (ref 13–44)
MCHC RBC-ENTMCNC: 29.7 PG — SIGNIFICANT CHANGE UP (ref 27–34)
MCHC RBC-ENTMCNC: 32.8 G/DL — SIGNIFICANT CHANGE UP (ref 32–36)
MCV RBC AUTO: 90.8 FL — SIGNIFICANT CHANGE UP (ref 80–100)
MONOCYTES # BLD AUTO: 0.78 K/UL — SIGNIFICANT CHANGE UP (ref 0–0.9)
MONOCYTES NFR BLD AUTO: 10.1 % — SIGNIFICANT CHANGE UP (ref 2–14)
NEUTROPHILS # BLD AUTO: 4.8 K/UL — SIGNIFICANT CHANGE UP (ref 1.8–7.4)
NEUTROPHILS NFR BLD AUTO: 62.2 % — SIGNIFICANT CHANGE UP (ref 43–77)
NRBC # BLD: 0 /100 WBCS — SIGNIFICANT CHANGE UP (ref 0–0)
PLATELET # BLD AUTO: 316 K/UL — SIGNIFICANT CHANGE UP (ref 150–400)
RBC # BLD: 4.44 M/UL — SIGNIFICANT CHANGE UP (ref 3.8–5.2)
RBC # FLD: 14.6 % — HIGH (ref 10.3–14.5)
WBC # BLD: 7.73 K/UL — SIGNIFICANT CHANGE UP (ref 3.8–10.5)
WBC # FLD AUTO: 7.73 K/UL — SIGNIFICANT CHANGE UP (ref 3.8–10.5)

## 2022-03-04 PROCEDURE — 99214 OFFICE O/P EST MOD 30 MIN: CPT

## 2022-03-07 LAB
25(OH)D3 SERPL-MCNC: 33.5 NG/ML
ALBUMIN SERPL ELPH-MCNC: 4.5 G/DL
ALP BLD-CCNC: 63 U/L
ALT SERPL-CCNC: 19 U/L
ANION GAP SERPL CALC-SCNC: 17 MMOL/L
AST SERPL-CCNC: 20 U/L
BILIRUB SERPL-MCNC: 0.2 MG/DL
BUN SERPL-MCNC: 24 MG/DL
CALCIUM SERPL-MCNC: 10.2 MG/DL
CHLORIDE SERPL-SCNC: 105 MMOL/L
CHOLEST SERPL-MCNC: 214 MG/DL
CO2 SERPL-SCNC: 22 MMOL/L
CREAT SERPL-MCNC: 0.91 MG/DL
EGFR: 68 ML/MIN/1.73M2
GLUCOSE SERPL-MCNC: 78 MG/DL
HDLC SERPL-MCNC: 76 MG/DL
LDLC SERPL CALC-MCNC: 115 MG/DL
NONHDLC SERPL-MCNC: 138 MG/DL
POTASSIUM SERPL-SCNC: 4.5 MMOL/L
PROT SERPL-MCNC: 6.8 G/DL
SODIUM SERPL-SCNC: 145 MMOL/L
TRIGL SERPL-MCNC: 115 MG/DL

## 2022-03-16 NOTE — HISTORY OF PRESENT ILLNESS
[Disease: _____________________] : Disease: [unfilled] [T: ___] : T[unfilled] [N: ___] : N[unfilled] [M: ___] : M[unfilled] [AJCC Stage: ____] : AJCC Stage: [unfilled] [de-identified] : Pt initially presented at age 63 in 2/2014 with right breast lump. Bilateral mammogram showed focal asymmetry in right lower outer quadrant with US demonstrating irregular mass measuring 2.8cm.  She had a right lumpectomy 4/2014 and ALND by Dr. Zunidla Yuen which showed a 2.6cm invasive ductal carcinoma grade 2 with LVI and PNI.  10/20 right axillary LN were positive for malignancy, +MARTI.  T2N3M0, stage IIIC.  ER 90%, AL 60%, HER2 negative IHC 0 staining however CISH was amplified (2.4).  Post op pt received adjuvant chemotherapy with ddAC-THP.  Radiation therapy with [facility].  She started letrozole 2.5mg daily which she continues to present time.\par \par She was clinically CHRIS until 5/19/21 when she went for screening mammo/US which showed an irregular focal asymmetry within the right upper outer quadrant which corresponds to irregular solid mass on concomitant US at right 10:00 10cmfn, biopsy rec, BIRADS 4C.\par \par Pt had a right breast 10:00 10cmfn US guided biopsy on 5/24/21 which showed invasive mammary carcinoma, mere score 6/9 (3=2+1), invasive tumor measures at least 0.3cm, no DCIS, ER 90%, AL 0%, HER2 negative 1+ IHC.\par \par Breast MRI on 5/27/2021 showed 0.9cm irregular mass in upper outer posterior right breast, corresponding to newly diagnosed malignancy; no evidence of multicentric or contralateral disease, BIRADS6.\par \par Pt underwent right breast mastectomy and prophylactic left breast mastectomy with Dr. Zunilda Yuen on 6/15/2021.  Left breast showed mild-proliferative fibrocystic changes.  Right breast mastectomy showed 10:00 invasive ductal carcinoma 6mm, Mere score 6/9 grade II (3+2+1), negative margins, ER 90%, AL 0%, HER2 negative IHC 1+,  pT1b pNx\par \par Oncotype score 28. \par \par Patient has completed her 4 cycle course of adjuvant chemotherapy with taxotere and cytoxan on 9/23/21.  \par \par Daughter with melanoma age 40.  maternal gpa lung cancer age 65, smoker.  Maternal aunt stomach cancer age 82.  Paternal gma unknown cancer age 30.  No ovary, endometrium, pancreatic cancer. Genetic testing INV[facility]AE 4/28/2019 VUS MELVA, NBN, NF1.   [de-identified] : invasive ductal carcinoma, ER 90%, LA 0%, HER2 negative [de-identified] : \par started exemestane 25 mg po q day on 11/1/2021\par elevated BP since being on AI, back on losartan 50 mg daily, Also with hair thinning will try biotin\par denies chest wall masses.\par Seeing breast surgeon Dr. Yuen 12/8/21\par Labs next visit\par \par HEALTH MAINTENANCE \par PCP Dr. Agnes Plunkett\par Dental clearance seeing 4/5/22, will get clearance\par OPHTHO will make appt\par MRI showed pancreatic cyst, will get report\par Bone Density: July 2020 reviewed, Prolia at [facility] for osteoporosis which had improved to osteopenia while on treatment.  Her last dose was 11/16/21.\par Gastrointestinal: Dr. Alvarez, Colonoscopy 9/2018 - 2 polyps, done 10/2021 - no polyps will get report. \par GYN Dr. Gerard Tyson and pap smear seen today, results pending, no vaginal bleeding or spotting\par COVID vaccine 3/24 Pfizer. s/p COVID booster left arm \par Back to work 3x/week and 2x at home, daughter is teacher

## 2022-03-16 NOTE — ASSESSMENT
[FreeTextEntry1] : SHADIA RIVAS has a h/o right breast invasive ductal carcinoma in 2/2014, stage IIIC, T2N3M0, ER 90%, DC 60%, HER2 negative HER2 negative IHC 0 staining however CISH was amplified (2.4), s/p right lumpectomy 4/2014 and ALND, adjuvant chemotherapy with ddAC-THP, s/p radiation therapy, on letrozole until the present time.  New primary right invasive ductal carcinoma 5/2021, 6mm, ER 90%, DC 0%, HER2 negative, pT1b pNx, Stage IA.  s/p right breast mastectomy and prophylactic left breast mastectomy.  Oncotype 28.  Pt completed adjuvant chemotherapy with TC chemotherapy on 9/23/21.\par \par -CHRIS 7 years from Her2 positive CA in 2014; CHRIS x 10 months for new primary\par -started exemestane on 11/1/2021, tolerating well.\par -continue to fu with Dr. Yuen as recommended\par -labs reviewed\par -continue prolia q6mos for osteoporosis\par -FU in 3mos

## 2022-03-16 NOTE — PHYSICAL EXAM
[Restricted in physically strenuous activity but ambulatory and able to carry out work of a light or sedentary nature] : Status 1- Restricted in physically strenuous activity but ambulatory and able to carry out work of a light or sedentary nature, e.g., light house work, office work [Normal] : affect appropriate [de-identified] : s/p b/l mastectomy without reconstruction; no chest wall masses, no skin changes; prominence of right upper ribs (unchanged from childhood) no palpable adenopathy  [de-identified] : left dorsum of hand large area of erythema resolved with residual hyperpigmentation of skin. , no bullae, no ulceration or blisters,

## 2022-04-05 ENCOUNTER — OUTPATIENT (OUTPATIENT)
Dept: OUTPATIENT SERVICES | Facility: HOSPITAL | Age: 72
LOS: 1 days | Discharge: HOME | End: 2022-04-05

## 2022-04-05 DIAGNOSIS — E89.0 POSTPROCEDURAL HYPOTHYROIDISM: Chronic | ICD-10-CM

## 2022-04-05 DIAGNOSIS — Z98.890 OTHER SPECIFIED POSTPROCEDURAL STATES: Chronic | ICD-10-CM

## 2022-04-05 DIAGNOSIS — C50.919 MALIGNANT NEOPLASM OF UNSPECIFIED SITE OF UNSPECIFIED FEMALE BREAST: ICD-10-CM

## 2022-04-05 DIAGNOSIS — N60.02 SOLITARY CYST OF LEFT BREAST: Chronic | ICD-10-CM

## 2022-04-05 DIAGNOSIS — Z90.10 ACQUIRED ABSENCE OF UNSPECIFIED BREAST AND NIPPLE: Chronic | ICD-10-CM

## 2022-04-19 NOTE — ASU PATIENT PROFILE, ADULT - ATTEMPT TO OOB
Quality 130: Documentation Of Current Medications In The Medical Record: Current Medications Documented Quality 110: Preventive Care And Screening: Influenza Immunization: Influenza Immunization previously received during influenza season Quality 431: Preventive Care And Screening: Unhealthy Alcohol Use - Screening: Patient not identified as an unhealthy alcohol user when screened for unhealthy alcohol use using a systematic screening method Quality 226: Preventive Care And Screening: Tobacco Use: Screening And Cessation Intervention: Patient screened for tobacco use and is an ex/non-smoker Detail Level: Detailed no

## 2022-05-12 ENCOUNTER — OUTPATIENT (OUTPATIENT)
Dept: OUTPATIENT SERVICES | Facility: HOSPITAL | Age: 72
LOS: 1 days | Discharge: HOME | End: 2022-05-12

## 2022-05-12 DIAGNOSIS — E89.0 POSTPROCEDURAL HYPOTHYROIDISM: Chronic | ICD-10-CM

## 2022-05-12 DIAGNOSIS — N60.02 SOLITARY CYST OF LEFT BREAST: Chronic | ICD-10-CM

## 2022-05-12 DIAGNOSIS — Z90.10 ACQUIRED ABSENCE OF UNSPECIFIED BREAST AND NIPPLE: Chronic | ICD-10-CM

## 2022-05-12 DIAGNOSIS — Z98.890 OTHER SPECIFIED POSTPROCEDURAL STATES: Chronic | ICD-10-CM

## 2022-05-12 DIAGNOSIS — C50.919 MALIGNANT NEOPLASM OF UNSPECIFIED SITE OF UNSPECIFIED FEMALE BREAST: ICD-10-CM

## 2022-05-12 NOTE — ED PROVIDER NOTE - HIV OFFER
Opt out

## 2022-05-16 ENCOUNTER — APPOINTMENT (OUTPATIENT)
Dept: RADIOLOGY | Facility: IMAGING CENTER | Age: 72
End: 2022-05-16
Payer: COMMERCIAL

## 2022-05-16 ENCOUNTER — APPOINTMENT (OUTPATIENT)
Dept: INFUSION THERAPY | Facility: HOSPITAL | Age: 72
End: 2022-05-16

## 2022-05-16 ENCOUNTER — OUTPATIENT (OUTPATIENT)
Dept: OUTPATIENT SERVICES | Facility: HOSPITAL | Age: 72
LOS: 1 days | End: 2022-05-16
Payer: COMMERCIAL

## 2022-05-16 DIAGNOSIS — Z98.890 OTHER SPECIFIED POSTPROCEDURAL STATES: Chronic | ICD-10-CM

## 2022-05-16 DIAGNOSIS — C50.911 MALIGNANT NEOPLASM OF UNSPECIFIED SITE OF RIGHT FEMALE BREAST: ICD-10-CM

## 2022-05-16 DIAGNOSIS — N60.02 SOLITARY CYST OF LEFT BREAST: Chronic | ICD-10-CM

## 2022-05-16 DIAGNOSIS — Z00.8 ENCOUNTER FOR OTHER GENERAL EXAMINATION: ICD-10-CM

## 2022-05-16 DIAGNOSIS — Z90.10 ACQUIRED ABSENCE OF UNSPECIFIED BREAST AND NIPPLE: Chronic | ICD-10-CM

## 2022-05-16 DIAGNOSIS — E89.0 POSTPROCEDURAL HYPOTHYROIDISM: Chronic | ICD-10-CM

## 2022-05-16 PROCEDURE — 77085 DXA BONE DENSITY AXL VRT FX: CPT | Mod: 26

## 2022-05-16 PROCEDURE — 77085 DXA BONE DENSITY AXL VRT FX: CPT

## 2022-05-17 DIAGNOSIS — C79.51 SECONDARY MALIGNANT NEOPLASM OF BONE: ICD-10-CM

## 2022-06-03 ENCOUNTER — APPOINTMENT (OUTPATIENT)
Dept: HEMATOLOGY ONCOLOGY | Facility: CLINIC | Age: 72
End: 2022-06-03

## 2022-06-03 VITALS
HEIGHT: 62.01 IN | HEART RATE: 64 BPM | OXYGEN SATURATION: 96 % | DIASTOLIC BLOOD PRESSURE: 85 MMHG | BODY MASS INDEX: 24.11 KG/M2 | RESPIRATION RATE: 16 BRPM | SYSTOLIC BLOOD PRESSURE: 137 MMHG | TEMPERATURE: 97.5 F | WEIGHT: 132.72 LBS

## 2022-06-03 PROCEDURE — 99214 OFFICE O/P EST MOD 30 MIN: CPT

## 2022-06-08 ENCOUNTER — APPOINTMENT (OUTPATIENT)
Dept: ORTHOPEDIC SURGERY | Facility: CLINIC | Age: 72
End: 2022-06-08
Payer: COMMERCIAL

## 2022-06-08 VITALS — HEIGHT: 62 IN | BODY MASS INDEX: 24.29 KG/M2 | WEIGHT: 132 LBS

## 2022-06-08 DIAGNOSIS — M20.12 HALLUX VALGUS (ACQUIRED), LEFT FOOT: ICD-10-CM

## 2022-06-08 DIAGNOSIS — M20.41 OTHER HAMMER TOE(S) (ACQUIRED), RIGHT FOOT: ICD-10-CM

## 2022-06-08 PROCEDURE — 73630 X-RAY EXAM OF FOOT: CPT | Mod: RT

## 2022-06-08 PROCEDURE — 99204 OFFICE O/P NEW MOD 45 MIN: CPT

## 2022-06-08 NOTE — PHYSICAL EXAM
[NL (40)] : plantar flexion 40 degrees [NL 30)] : inversion 30 degrees [NL (20)] : eversion 20 degrees [5___] : [facility] 5[unfilled]/5 [2+] : posterior tibialis pulse: 2+ [Normal] : saphenous nerve sensation normal [2nd] : 2nd [] : non-antalgic [Right] : right foot [Weight -] : weightbearing [FreeTextEntry8] : Callus beneath 2nd metatarsal head [de-identified] : Hallux valgus (IM 16, HV 36), pes planus, 2nd hammer toe, narrowing at 2nd and 3rd tmt joints with dorsal midfoot osteophytes

## 2022-06-08 NOTE — HISTORY OF PRESENT ILLNESS
[0] : 0 [3] : 3 [Dull/Aching] : dull/aching [Localized] : localized [Full time] : Work status: full time [de-identified] : Pt is a 72 year old F who presents today for evaluation of their right foot.  She reports a progressive bunion deformity that causes intermittent pain.  She has difficulty with certain shoes.  She also has a painful second toe.  She feels she has to walk on the outside of her foot to compensate.  No numbness/tingling. [] : Post Surgical Visit: no [FreeTextEntry1] : R Foot [FreeTextEntry3] : N/A Chronic [FreeTextEntry5] : pt is a 73 y/o fem in fo reval of the R Foot pt states NKI pt states pain is chronic due to presence of a bunion  [de-identified] : None [de-identified] : [employer]

## 2022-06-08 NOTE — ASSESSMENT
[FreeTextEntry1] : Patient was discussed nonsurgical options (shoe modifications) as well as surgery (Ludloff bunionectomy, 2nd Weil  osteotomy/MTP joint release/PIP fusion).  \par \par The risks and benefits of surgery have been discussed. Risks include but are not limited to bleeding, infection, reaction to anesthesia, injury to blood vessels and nerves, malunion, nonunion, necessity of repeat surgery, chronic pain, loss of limb and death. The patient understands the risks and agrees with the surgical plan. Details of the procedure and postoperative protocol were discussed at length. All questions have been answered.\par \par Patient was explained that well after recovering from surgery, wearing high heels/narrow toe box shoes can lead to recurrence of deformities.  The possibility of some loss of deformity correction was also discussed in detail, as this is not uncommon.\par \par Patient would like to think about her options and will get back to me.

## 2022-07-03 NOTE — PHYSICAL EXAM
[Restricted in physically strenuous activity but ambulatory and able to carry out work of a light or sedentary nature] : Status 1- Restricted in physically strenuous activity but ambulatory and able to carry out work of a light or sedentary nature, e.g., light house work, office work [Normal] : affect appropriate [de-identified] : s/p b/l mastectomy without reconstruction; no chest wall masses, no skin changes; prominence of right upper ribs (unchanged from childhood) no palpable adenopathy  [de-identified] : left dorsum of hand large area of erythema resolved with residual hyperpigmentation of skin. , no bullae, no ulceration or blisters,

## 2022-07-03 NOTE — ASSESSMENT
[FreeTextEntry1] : SHADIA RIVAS has a h/o right breast invasive ductal carcinoma in 2/2014, stage IIIC, T2N3M0, ER 90%, IN 60%, HER2 negative HER2 negative IHC 0 staining however CISH was amplified (2.4), s/p right lumpectomy 4/2014 and ALND, adjuvant chemotherapy with ddAC-THP, s/p radiation therapy, on letrozole until the present time.  New primary right invasive ductal carcinoma 5/2021, 6mm, ER 90%, IN 0%, HER2 negative, pT1b pNx, Stage IA.  s/p right breast mastectomy and prophylactic left breast mastectomy.  Oncotype 28.  Pt completed adjuvant chemotherapy with TC chemotherapy on 9/23/21.\par \par -CHRIS 8 years from Her2 positive CA in 2014; CHRIS x 13 months for new primary\par -started exemestane on 11/1/2021, tolerating well.\par -continue to fu with Dr. Yuen as recommended\par -labs reviewed\par -continue prolia q6mos for osteoporosis\par -FU in 3mos

## 2022-07-03 NOTE — HISTORY OF PRESENT ILLNESS
[Disease: _____________________] : Disease: [unfilled] [T: ___] : T[unfilled] [N: ___] : N[unfilled] [M: ___] : M[unfilled] [AJCC Stage: ____] : AJCC Stage: [unfilled] [de-identified] : Pt initially presented at age 63 in 2/2014 with right breast lump. Bilateral mammogram showed focal asymmetry in right lower outer quadrant with US demonstrating irregular mass measuring 2.8cm.  She had a right lumpectomy 4/2014 and ALND by Dr. Zunilda Yuen which showed a 2.6cm invasive ductal carcinoma grade 2 with LVI and PNI.  10/20 right axillary LN were positive for malignancy, +MARTI.  T2N3M0, stage IIIC.  ER 90%, MO 60%, HER2 negative IHC 0 staining however CISH was amplified (2.4).  Post op pt received adjuvant chemotherapy with ddAC-THP.  Radiation therapy with [facility].  She started letrozole 2.5mg daily which she continues to present time.\par \par She was clinically CHRIS until 5/19/21 when she went for screening mammo/US which showed an irregular focal asymmetry within the right upper outer quadrant which corresponds to irregular solid mass on concomitant US at right 10:00 10cmfn, biopsy rec, BIRADS 4C.\par \par Pt had a right breast 10:00 10cmfn US guided biopsy on 5/24/21 which showed invasive mammary carcinoma, mere score 6/9 (3=2+1), invasive tumor measures at least 0.3cm, no DCIS, ER 90%, MO 0%, HER2 negative 1+ IHC.\par \par Breast MRI on 5/27/2021 showed 0.9cm irregular mass in upper outer posterior right breast, corresponding to newly diagnosed malignancy; no evidence of multicentric or contralateral disease, BIRADS6.\par \par Pt underwent right breast mastectomy and prophylactic left breast mastectomy with Dr. Zunilda Yuen on 6/15/2021.  Left breast showed mild-proliferative fibrocystic changes.  Right breast mastectomy showed 10:00 invasive ductal carcinoma 6mm, Mere score 6/9 grade II (3+2+1), negative margins, ER 90%, MO 0%, HER2 negative IHC 1+,  pT1b pNx\par \par Oncotype score 28. \par \par Patient has completed her 4 cycle course of adjuvant chemotherapy with taxotere and cytoxan on 9/23/21.  \par \par Started exemestane 25 mg po q day on 11/1/2021.\par \par Daughter with melanoma age 40.  maternal gpa lung cancer age 65, smoker.  Maternal aunt stomach cancer age 82.  Paternal gma unknown cancer age 30.  No ovary, endometrium, pancreatic cancer. Genetic testing [facility] 4/28/2019 VUS MELVA, NBN, NF1.   [de-identified] : invasive ductal carcinoma, ER 90%, MA 0%, HER2 negative [de-identified] : \par started exemestane 25 mg po q day on 11/1/2021\par elevated BP since being on AI, back on losartan 50 mg daily, Also with hair thinning will try biotin\par denies chest wall masses or skin changes.\par Last saw breast surgeon Dr. Yuen 12/8/21\par Labs reviewed from 3/4/22\par \par HEALTH MAINTENANCE \par PCP Dr. Agnes Plunkett\par Dental clearance seeing 4/5/22, will get clearance\par OPHTHO will make appt\par MRI showed pancreatic cyst, will get report PH\par Bone Density: 5/16/22 osteopenia femoral neck -1.8 , Prolia at [facility] for osteoporosis which had improved to osteopenia while on treatment.  Her last dose was 11/16/21.\par Gastrointestinal: Dr. Alvarez, Colonoscopy 9/2018 - 2 polyps, done 10/2021 - no polyps will get report. \par GYN Dr. Gerard Tyson and pap smear seen today, results pending, no vaginal bleeding or spotting\par COVID vaccine 3/24 Pfizer. s/p COVID booster left arm \par Back to work 3x/week and 2x at home, daughter is teacher

## 2022-08-08 NOTE — DISCUSSION/SUMMARY
[Radiation] : Radiation: No [Genetic Counseling] : Genetic Counseling: No [FreeTextEntry1] : Adjuvant chemotherapy with TC:\par docetaxel / cyclophosphamide every three weeks x 4 cycles  completed 9/23/2021\par  [FreeTextEntry2] : Risk reduction hormone therapy with exemestane initiated 10/2021 [FreeTextEntry7] : Bone density study should be performed prior to aromatase inhibitor initiation and every\par two years for duration of therapy. Known osteoporosis. Prolia every 6 months.\par Prior history of right breast IDC -stage lllC 2/2014.ER+/[place]-/HER 2 marcelino +   Adjuvant therapy with dose dense AC- THP,\par lumpectomy, radiation and letrozole until recent diagnosis 5/2021 [FreeTextEntry8] : [facility]

## 2022-09-08 ENCOUNTER — OUTPATIENT (OUTPATIENT)
Dept: OUTPATIENT SERVICES | Facility: HOSPITAL | Age: 72
LOS: 1 days | Discharge: HOME | End: 2022-09-08

## 2022-09-08 DIAGNOSIS — E89.0 POSTPROCEDURAL HYPOTHYROIDISM: Chronic | ICD-10-CM

## 2022-09-08 DIAGNOSIS — Z98.890 OTHER SPECIFIED POSTPROCEDURAL STATES: Chronic | ICD-10-CM

## 2022-09-08 DIAGNOSIS — C50.919 MALIGNANT NEOPLASM OF UNSPECIFIED SITE OF UNSPECIFIED FEMALE BREAST: ICD-10-CM

## 2022-09-08 DIAGNOSIS — N60.02 SOLITARY CYST OF LEFT BREAST: Chronic | ICD-10-CM

## 2022-09-08 DIAGNOSIS — Z90.10 ACQUIRED ABSENCE OF UNSPECIFIED BREAST AND NIPPLE: Chronic | ICD-10-CM

## 2022-09-09 ENCOUNTER — APPOINTMENT (OUTPATIENT)
Dept: HEMATOLOGY ONCOLOGY | Facility: CLINIC | Age: 72
End: 2022-09-09

## 2022-09-09 VITALS
HEIGHT: 62 IN | WEIGHT: 136.66 LBS | BODY MASS INDEX: 25.15 KG/M2 | SYSTOLIC BLOOD PRESSURE: 144 MMHG | OXYGEN SATURATION: 98 % | DIASTOLIC BLOOD PRESSURE: 84 MMHG | RESPIRATION RATE: 16 BRPM | TEMPERATURE: 97 F | HEART RATE: 74 BPM

## 2022-09-09 PROCEDURE — 99214 OFFICE O/P EST MOD 30 MIN: CPT

## 2022-09-09 RX ORDER — OMEPRAZOLE 40 MG/1
40 CAPSULE, DELAYED RELEASE ORAL
Qty: 30 | Refills: 3 | Status: COMPLETED | COMMUNITY
Start: 2021-07-21 | End: 2022-09-09

## 2022-09-09 RX ORDER — DEXAMETHASONE 4 MG/1
4 TABLET ORAL
Qty: 8 | Refills: 4 | Status: COMPLETED | COMMUNITY
Start: 2021-07-21 | End: 2022-09-09

## 2022-09-09 RX ORDER — CEPHALEXIN 500 MG/1
500 CAPSULE ORAL
Qty: 14 | Refills: 0 | Status: COMPLETED | COMMUNITY
Start: 2021-10-05 | End: 2022-09-09

## 2022-09-09 RX ORDER — METOCLOPRAMIDE 10 MG/1
10 TABLET ORAL EVERY 6 HOURS
Qty: 60 | Refills: 2 | Status: COMPLETED | COMMUNITY
Start: 2021-07-22 | End: 2022-09-09

## 2022-09-09 RX ORDER — APREPITANT 80 MG/1
80 CAPSULE ORAL
Qty: 16 | Refills: 1 | Status: COMPLETED | COMMUNITY
Start: 2021-07-21 | End: 2022-09-09

## 2022-09-09 RX ORDER — LIDOCAINE AND PRILOCAINE 25; 25 MG/G; MG/G
2.5-2.5 CREAM TOPICAL
Qty: 30 | Refills: 2 | Status: COMPLETED | COMMUNITY
Start: 2021-07-21 | End: 2022-09-09

## 2022-09-09 NOTE — ASSESSMENT
[FreeTextEntry1] : SHADIA RIVAS has a h/o right breast invasive ductal carcinoma in 2/2014, stage IIIC, T2N3M0, ER 90%, MN 60%, HER2 negative HER2 negative IHC 0 staining however CISH was amplified (2.4), s/p right lumpectomy 4/2014 and ALND, adjuvant chemotherapy with ddAC-THP, s/p radiation therapy, on letrozole until the present time.  New primary right invasive ductal carcinoma 5/2021, 6mm, ER 90%, MN 0%, HER2 negative, pT1b pNx, Stage IA.  s/p right breast mastectomy and prophylactic left breast mastectomy.  Oncotype 28.  Pt completed adjuvant chemotherapy with TC chemotherapy on 9/23/21.\par \par -CHRIS 8 years from Her2 positive CA in 2014; CHRIS x 18 months for new primary\par -started exemestane on 11/1/2021, tolerating well.\par -continue to fu with Dr. Yuen as recommended\par -labs ordered to be done within 30 days of Prolia \par \par Osteoporosis\par -continue prolia q6mos for osteoporosis due November 2022\par -FU in 4mos - will see Dr. Starr at next visit. \par \par PMD at least annually with lipid checks/bloodwork, gyn at least annually and PAP test screening per their recommendations, colon cancer screening/colonoscopy at least every 10 years as recommended by PMD/GI , dermatology for annual skin checks  ophthalmology for annual eye exams\par

## 2022-09-09 NOTE — PHYSICAL EXAM
[Normal] : affect appropriate [Fully active, able to carry on all pre-disease performance without restriction] : Status 0 - Fully active, able to carry on all pre-disease performance without restriction [de-identified] : s/p b/l mastectomy without reconstruction; no chest wall masses, no skin changes; prominence of right upper ribs (unchanged from childhood) no palpable adenopathy  [de-identified] : left dorsum of hand large area of erythema resolved with residual hyperpigmentation of skin. , no bullae, no ulceration or blisters,

## 2022-09-09 NOTE — REVIEW OF SYSTEMS
[Abdominal Pain] : no abdominal pain [Vomiting] : no vomiting [Constipation] : no constipation [Diarrhea] : no diarrhea [Skin Rash] : no skin rash [Negative] : Integumentary [FreeTextEntry7] : + heartburn

## 2022-09-09 NOTE — HISTORY OF PRESENT ILLNESS
[Disease: _____________________] : Disease: [unfilled] [T: ___] : T[unfilled] [N: ___] : N[unfilled] [M: ___] : M[unfilled] [AJCC Stage: ____] : AJCC Stage: [unfilled] [de-identified] : Pt initially presented at age 63 in 2/2014 with right breast lump. Bilateral mammogram showed focal asymmetry in right lower outer quadrant with US demonstrating irregular mass measuring 2.8cm.  She had a right lumpectomy 4/2014 and ALND by Dr. Zunilda Yuen which showed a 2.6cm invasive ductal carcinoma grade 2 with LVI and PNI.  10/20 right axillary LN were positive for malignancy, +MARTI.  T2N3M0, stage IIIC.  ER 90%, OR 60%, HER2 negative IHC 0 staining however CISH was amplified (2.4).  Post op pt received adjuvant chemotherapy with ddAC-THP.  Radiation therapy with [facility].  She started letrozole 2.5mg daily which she continues to present time.\par \par She was clinically CHRIS until 5/19/21 when she went for screening mammo/US which showed an irregular focal asymmetry within the right upper outer quadrant which corresponds to irregular solid mass on concomitant US at right 10:00 10cmfn, biopsy rec, BIRADS 4C.\par \par Pt had a right breast 10:00 10cmfn US guided biopsy on 5/24/21 which showed invasive mammary carcinoma, mere score 6/9 (3=2+1), invasive tumor measures at least 0.3cm, no DCIS, ER 90%, OR 0%, HER2 negative 1+ IHC.\par \par Breast MRI on 5/27/2021 showed 0.9cm irregular mass in upper outer posterior right breast, corresponding to newly diagnosed malignancy; no evidence of multicentric or contralateral disease, BIRADS6.\par \par Pt underwent right breast mastectomy and prophylactic left breast mastectomy with Dr. Zunilda Yuen on 6/15/2021.  Left breast showed mild-proliferative fibrocystic changes.  Right breast mastectomy showed 10:00 invasive ductal carcinoma 6mm, Mere score 6/9 grade II (3+2+1), negative margins, ER 90%, OR 0%, HER2 negative IHC 1+,  pT1b pNx\par \par Oncotype score 28. \par \par Patient has completed her 4 cycle course of adjuvant chemotherapy with taxotere and cytoxan on 9/23/21.  \par \par Started exemestane 25 mg po q day on 11/1/2021.\par \par Daughter with melanoma age 40.  maternal gpa lung cancer age 65, smoker.  Maternal aunt stomach cancer age 82.  Paternal gma unknown cancer age 30.  No ovary, endometrium, pancreatic cancer. Genetic testing [facility] 4/28/2019 VUS MELVA, NBN, NF1.   [de-identified] : invasive ductal carcinoma, ER 90%, CO 0%, HER2 negative [de-identified] : 9/9/2022\par Patient presents today to rule out metastatic breast cancer and assess treatment toxicity.\par started exemestane 25 mg po q day on 11/1/2021\par denies chest wall masses or skin changes.\par Patient denies any SOB, CP, abdominal pain, bone pain, headache, or unexplained weight loss\par Patient denies any hotflashes, arthralgias, vaginal dryness, vaginal bleeding, hair loss, muscle cramps.\par Bone Density: 5/16/22 osteopenia femoral neck -1.8 , On Prolia every 6 months - due November 2022. [facility]\par Last saw breast surgeon Dr. Yuen 12/8/21\par Labs reviewed from 3/4/22\par \par HEALTH MAINTENANCE \par PCP Dr. Anges Plunkett\par OPHTHO will make appt\par MRI showed pancreatic cyst, will get report PH\par Gastrointestinal: Dr. Alvarez, Colonoscopy 9/2018 - 2 polyps, done 10/2021 - no polyps will get report. \par GYN Dr. Gerard Tyson , no vaginal bleeding or spotting\par COVID vaccine 3/24 Pfizer. s/p COVID booster left arm \par Back to work 3x/week and 2x at home, daughter is teacher\par \par Reviewed patient intake form:\par Patient does have diagnosis of hypertension followed by PCPon losartan\par Patient does NOT have diagnosis of diabetes: Last Hgb A1c - not available\par Patient has NOT fallen in the last 12 months:\par Patient is NOT a current smoker.\par Last flu vaccine:  10/21 - patient advised to have updated flu vaccine 2022\par LAST colonoscopy: 10/21 self reported\par LAST Mammogram: n/a - s/p b/l mastectomy\par

## 2022-10-24 LAB
BASOPHILS # BLD AUTO: 0.06 K/UL
BASOPHILS NFR BLD AUTO: 0.9 %
EOSINOPHIL # BLD AUTO: 0.14 K/UL
EOSINOPHIL NFR BLD AUTO: 2.2 %
HCT VFR BLD CALC: 43.4 %
HGB BLD-MCNC: 13.9 G/DL
IMM GRANULOCYTES NFR BLD AUTO: 0.3 %
LYMPHOCYTES # BLD AUTO: 1.83 K/UL
LYMPHOCYTES NFR BLD AUTO: 28.7 %
MAN DIFF?: NORMAL
MCHC RBC-ENTMCNC: 30.5 PG
MCHC RBC-ENTMCNC: 32 GM/DL
MCV RBC AUTO: 95.2 FL
MONOCYTES # BLD AUTO: 0.7 K/UL
MONOCYTES NFR BLD AUTO: 11 %
NEUTROPHILS # BLD AUTO: 3.63 K/UL
NEUTROPHILS NFR BLD AUTO: 56.9 %
PLATELET # BLD AUTO: 324 K/UL
RBC # BLD: 4.56 M/UL
RBC # FLD: 14 %
WBC # FLD AUTO: 6.38 K/UL

## 2022-10-25 LAB
ALBUMIN SERPL ELPH-MCNC: 4.6 G/DL
ALP BLD-CCNC: 54 U/L
ALT SERPL-CCNC: 18 U/L
ANION GAP SERPL CALC-SCNC: 11 MMOL/L
AST SERPL-CCNC: 20 U/L
BILIRUB SERPL-MCNC: 0.5 MG/DL
BUN SERPL-MCNC: 14 MG/DL
CALCIUM SERPL-MCNC: 9.7 MG/DL
CHLORIDE SERPL-SCNC: 104 MMOL/L
CO2 SERPL-SCNC: 25 MMOL/L
CREAT SERPL-MCNC: 0.82 MG/DL
EGFR: 76 ML/MIN/1.73M2
GLUCOSE SERPL-MCNC: 108 MG/DL
POTASSIUM SERPL-SCNC: 4.1 MMOL/L
PROT SERPL-MCNC: 6.8 G/DL
SODIUM SERPL-SCNC: 141 MMOL/L

## 2022-11-04 ENCOUNTER — OUTPATIENT (OUTPATIENT)
Dept: OUTPATIENT SERVICES | Facility: HOSPITAL | Age: 72
LOS: 1 days | Discharge: HOME | End: 2022-11-04

## 2022-11-04 DIAGNOSIS — C50.919 MALIGNANT NEOPLASM OF UNSPECIFIED SITE OF UNSPECIFIED FEMALE BREAST: ICD-10-CM

## 2022-11-04 DIAGNOSIS — Z90.10 ACQUIRED ABSENCE OF UNSPECIFIED BREAST AND NIPPLE: Chronic | ICD-10-CM

## 2022-11-04 DIAGNOSIS — N60.02 SOLITARY CYST OF LEFT BREAST: Chronic | ICD-10-CM

## 2022-11-04 DIAGNOSIS — E89.0 POSTPROCEDURAL HYPOTHYROIDISM: Chronic | ICD-10-CM

## 2022-11-04 DIAGNOSIS — Z98.890 OTHER SPECIFIED POSTPROCEDURAL STATES: Chronic | ICD-10-CM

## 2022-11-14 ENCOUNTER — APPOINTMENT (OUTPATIENT)
Dept: INFUSION THERAPY | Facility: HOSPITAL | Age: 72
End: 2022-11-14

## 2022-11-21 DIAGNOSIS — C79.51 SECONDARY MALIGNANT NEOPLASM OF BONE: ICD-10-CM

## 2022-12-05 NOTE — ED ADULT TRIAGE NOTE - SPO2 (%)
Patient Name: Durga Valenzuela  : 1948    MRN: [id number]                              Today's Date: 2022       Admit Date: 2022    Visit Dx:     ICD-10-CM ICD-9-CM   1. Failed total knee, left, sequela  T84.093S 909.3   2. History of left knee replacement  Z96.652 V43.65   3. Pain due to hip joint prosthesis ([facility])  T84.84XA 996.77    Z96.649 V43.64     Patient Active Problem List   Diagnosis   • Post traumatic stress disorder (PTSD)   • Benign prostatic hyperplasia with urinary frequency   • Chronic insomnia   • Lumbar degenerative disc disease   • Impaired fasting glucose   • Benign essential hypertension   • Primary osteoarthritis involving multiple joints   • Hyperlipidemia   • Primary hypothyroidism   • Obstructive sleep apnea, tolerates CPAP well.  Previously failed oral appliance.   • History of pulmonary embolism   • Non-occlusive coronary artery disease, 2017--normal LM; proximal LAD mild LI; 50% mid LAD.  Mild distal LAD LI; normal Cx.  Mild LI marginal branches; normal RCA, nondominant.  EF 60%.   • Therapeutic drug monitoring   • Vitamin D deficiency   • Psoriasis   • Allergic rhinitis   • Depression with anxiety   • Multiple environmental allergies   • Bilateral lower extremity edema   • Morbid obesity ([facility])   • Spinal stenosis of lumbar region with neurogenic claudication   • Traumatic complete tear of left rotator cuff   • Urinary incontinence without sensory awareness   • Bipolar disorder, in full remission, most recent episode mixed ([facility])   • Weakness of both lower extremities   • Failed total knee, left, sequela     Past Medical History:   Diagnosis Date   • Affective psychosis, bipolar ([facility]) 2021   • Allergic rhinitis 2018    Patient has had allergy testing in the past which revealed allergies to molds and grass.  Immunotherapy for about 2 years in the 1980s.   • Benign essential hypertension 2016   • Benign prostatic hyperplasia with urinary frequency  02/25/2016   • Bilateral lower extremity edema 08/21/2018   • Bipolar disorder, in full remission, most recent episode mixed (HCC) 09/30/2021   • Chronic insomnia 02/25/2016   • Closed traumatic lateral subluxation of patellofemoral joint, right, initial encounter 08/13/2018    08/10/2018--patient was evaluated by the orthopedist for right knee pain and found to have lateral subluxation of the right patella associated with presence of right artificial knee joint.  Physical therapy no help.  Surgery is scheduled 09/17/2018   • Depression with anxiety 08/21/2018   • Elevated cholesterol    • History of deep venous thrombosis (DVT) of distal vein of right lower extremity 08/21/2018 04/26/2016--CTA of the chest performed for elevated d-dimer and progressive shortness of breath and chest pain for one month revealed bilateral occlusive in near occlusive segmental and subsegmental pulmonary emboli in all lobes of the right lung as well as within the left lower lobe.  No evidence of pulmonary infarct.  Nonspecific multifocal groundglass attenuation in the right pulmonary apex, perhaps representing pneumonitis or submental atelectasis.  Questionable cholelithiasis.  Doppler venous study was positive for DVT in the right popliteal vein.  Hypercoagulable workup was normal.  He was treated with Eliquis for a total of 6 months and was subsequently discontinued.   • History of pneumonia    • History of pulmonary embolism 06/10/2016    04/26/2016--CTA of the chest performed for elevated d-dimer and progressive shortness of breath and chest pain for one month revealed bilateral occlusive in near occlusive segmental and subsegmental pulmonary emboli in all lobes of the right lung as well as within the left lower lobe.  No evidence of pulmonary infarct.  Nonspecific multifocal groundglass attenuation in the right pulmonary apex, perhaps representing pneumonitis or submental atelectasis.  Questionable cholelithiasis.  Doppler  venous study was positive for DVT in the right popliteal vein.  Hypercoagulable workup was normal.  He was treated with Eliquis for a total of 6 months and was subsequently discontinued.   • Hyperlipidemia 02/25/2016   • Impaired fasting glucose 02/25/2016   • Lumbar degenerative disc disease 02/25/2016   • Morbidly obese (HCC) 10/19/2018   • Multiple environmental allergies 08/21/2018    Patient has had allergy testing in the past which revealed allergies to molds and grass.  Immunotherapy for about 2 years in the 1980s.   • Non-occlusive coronary artery disease, 05/16/2017--normal LM; proximal LAD mild LI; 50% mid LAD.  Mild distal LAD LI; normal Cx.  Mild LI marginal branches; normal RCA, nondominant.  EF 60%. 07/24/2017 05/16/2017--cardiac catheterization performed for abnormal stress test.  Normal LV with ejection fraction 60%.  Dilated aortic root.  No wall motion abnormalities.  Normal left main.  Ramus branch small caliber and normal.  Proximal LAD large caliber and mild luminal irregularities.  50% mid LAD.  Mild luminal irregularities distal LAD.  3 diagonal branches of small caliber with mild luminal irregularities.  Normal septal branches.  Circumflex is large caliber and free of disease.  Marginal branches are medium caliber with mild luminal irregularities.  RCA is a medium caliber and free of disease.  It is nondominant.   • Obstructive sleep apnea, tolerates CPAP well.  Previously failed oral appliance. 02/25/2016   • Post traumatic stress disorder (PTSD) 02/25/2016   • Primary hypothyroidism 02/25/2016   • Primary osteoarthritis involving multiple joints 02/25/2016   • Psoriasis 09/17/2014   • Spinal stenosis of lumbar region with neurogenic claudication 02/15/2021   • Traumatic complete tear of left rotator cuff 03/17/2021 March 14, 2021--patient was evaluated by the orthopedic surgeon after he slipped on ice and fell onto his left shoulder.  Work-up revealed complete rotator cuff tear  involving 2 tendons.  Specifically, he has an acute left supraspinatus tear and acute left infraspinatus tear.  He also has left glenohumeral joint osteoarthritis.  Apparently this is not repairable and patient would need a shoulder r   • Traumatic complete tear of left rotator cuff 03/17/2021 March 14, 2021--patient was evaluated by the orthopedic surgeon after he slipped on ice and fell onto his left shoulder.  Work-up revealed complete rotator cuff tear involving 2 tendons.  Specifically, he has an acute left supraspinatus tear and acute left infraspinatus tear.  He also has left glenohumeral joint osteoarthritis.  Apparently this is not repairable and patient would need a shoulder r   • Urinary incontinence without sensory awareness 03/17/2021   • Vitamin D deficiency 08/21/2018     Past Surgical History:   Procedure Laterality Date   • CARDIAC CATHETERIZATION N/A 5/16/2017    Procedure: Coronary angiography;  Surgeon: Malcolm Chen MD;  Location: [facility] CATH INVASIVE LOCATION;  Service:    • CARDIAC CATHETERIZATION N/A 5/16/2017    Procedure: Left Heart Cath;  Surgeon: Malcolm Chen MD;  Location: [facility] CATH INVASIVE LOCATION;  Service:    • CARDIAC CATHETERIZATION N/A 5/16/2017    Procedure: Left ventriculography;  Surgeon: Malcolm Chen MD;  Location: [facility] CATH INVASIVE LOCATION;  Service:    • COLONOSCOPY  2005 2005--colonoscopy reportedly normal.  Records not available.   • COLONOSCOPY N/A 6/13/2019    Procedure: COLONOSCOPY INTO CECUM WITH COLD BIOPSY POLYPECTOMY;  Surgeon: Ayaan Gallardo MD;  Location: [facility] ENDOSCOPY;  Service: General   • LUMBAR DECOMPRESSION  2007 2007--lumbar decompression without fusion or hardware.   • REVISION AMPUTATION OF FINGER  09/2017 September 2017--traumatic left index finger amputation with flap just distal to DIP joint.   • REVISION TOTAL KNEE ARTHROPLASTY Right 09/17/2018 09/17/2018--right total knee arthroplasty  revision due to lateral subluxation of the patella due to multiple factors.   • TOTAL KNEE ARTHROPLASTY Left 11/03/2017 11/03/2017--right total knee replacement complicated by patellofemoral subluxation.   • TOTAL KNEE ARTHROPLASTY Left 06/2014 June 2014--left total knee replacement.      General Information     [facility] Name 12/05/22 1445          Physical Therapy Time and Intention    Document Type evaluation  -     Mode of Treatment physical therapy  -[facility] Name 12/05/22 1445          General Information    Patient Profile Reviewed yes  -     Prior Level of Function independent:;all household mobility;community mobility  -     Existing Precautions/Restrictions brace worn when out of bed;fall  -     Barriers to Rehab none identified  -[facility] Name 12/05/22 1445          Living Environment    People in Home spouse  -[facility] Name 12/05/22 1445          Home Main Entrance    Number of Stairs, Main Entrance one  -     Stair Railings, Main Entrance none  -[facility] Name 12/05/22 1445          Cognition    Orientation Status (Cognition) oriented x 3  -           User Key  (r) = Recorded By, (t) = Taken By, (c) = Cosigned By    Initials Name Provider Type     Mulu Ordoñez, PT Physical Therapist               Mobility     Row Name 12/05/22 1445          Bed Mobility    Bed Mobility supine-sit;sit-supine  -     Supine-Sit [place] (Bed Mobility) contact guard  -     Sit-Supine [place] (Bed Mobility) minimum assist (75% patient effort)  -     Assistive Device (Bed Mobility) bed rails;head of bed elevated  -[facility] Name 12/05/22 1445          Transfers    Comment, (Transfers) cues for hand placement  -[facility] Name 12/05/22 1445          Sit-Stand Transfer    Sit-Stand [place] (Transfers) contact guard  -     Assistive Device (Sit-Stand Transfers) walker, front-wheeled  -[facility] Name 12/05/22 1443          Gait/Stairs (Locomotion)    [place] Level (Gait)  contact guard  -JW     Assistive Device (Gait) walker, front-wheeled  -JW     Ambulated day of surgery or within 4 hours of PACU discharge yes  -JW     Distance in Feet (Gait) 20  -JW     Deviations/Abnormal Patterns (Gait) base of support, wide  -JW     Bilateral Gait Deviations forward flexed posture  -     Comment, (Gait/Stairs) pt performs gait in immobilizer, unsteadiness noted during direction changes however no additional assistance required  -     Row Name 12/05/22 1446          Mobility    Extremity Weight-bearing Status left lower extremity  -     Left Lower Extremity (Weight-bearing Status) weight-bearing as tolerated (WBAT)  -           User Key  (r) = Recorded By, (t) = Taken By, (c) = Cosigned By    Initials Name Provider Type    Mulu Dinh PT Physical Therapist               Obj/Interventions     [facility] Name 12/05/22 1445          Range of Motion Comprehensive    Comment, General Range of Motion right LE functional within task, left LE not tested  -[facility] Name 12/05/22 1449          Strength Comprehensive (MMT)    Comment, General Manual Muscle Testing (MMT) Assessment right LE functional within task, left LE not tested  -[facility] Name 12/05/22 1443          Balance    Comment, Balance SBA for sitting balance, CGA for standing balance with walker  -     Row Name 12/05/22 144          Sensory Assessment (Somatosensory)    Sensory Assessment (Somatosensory) sensation intact  -           User Key  (r) = Recorded By, (t) = Taken By, (c) = Cosigned By    Initials Name Provider Type    Mulu Dinh PT Physical Therapist               Goals/Plan     [facility] Name 12/05/22 144          Bed Mobility Goal 1 (PT)    Activity/Assistive Device (Bed Mobility Goal 1, PT) bed mobility activities, all  -     [place] Level/Cues Needed (Bed Mobility Goal 1, PT) supervision required  -     Time Frame (Bed Mobility Goal 1, PT) 1 day  -     Progress/Outcomes (Bed Mobility Goal 1, PT)  new goal  -     Row Name 12/05/22 1445          Transfer Goal 1 (PT)    Activity/Assistive Device (Transfer Goal 1, PT) transfers, all  -JW     [place] Level/Cues Needed (Transfer Goal 1, PT) supervision required  -JW     Time Frame (Transfer Goal 1, PT) 1 day  -JW     Progress/Outcome (Transfer Goal 1, PT) new goal  -[facility] Name 12/05/22 1445          Gait Training Goal 1 (PT)    Activity/Assistive Device (Gait Training Goal 1, PT) gait (walking locomotion);assistive device use  -JW     [place] Level (Gait Training Goal 1, PT) supervision required  -JW     Distance (Gait Training Goal 1, PT) 100  -JW     Time Frame (Gait Training Goal 1, PT) 1 day  -JW     Progress/Outcome (Gait Training Goal 1, PT) new goal  -[facility] Name 12/05/22 1445          Therapy Assessment/Plan (PT)    Planned Therapy Interventions (PT) balance training;bed mobility training;gait training;home exercise program;patient/family education;strengthening;transfer training;stair training  -           User Key  (r) = Recorded By, (t) = Taken By, (c) = Cosigned By    Initials Name Provider Type    Mluu Dinh, PT Physical Therapist               Clinical Impression     [facility] Name 12/05/22 1445          Pain    Pretreatment Pain Rating 1/10  -     Posttreatment Pain Rating 5/10  -     Pain Location - Side/Orientation Left  -JW     Pain Location incisional  -     Pain Location - knee  -JW     Pain Intervention(s) Cold applied;Repositioned;Medication (See MAR)  -[facility] Name 12/05/22 1445          Plan of Care Review    Plan of Care Reviewed With patient;spouse  -     Outcome Evaluation Physical therapy evaluation complete.  Per operative note from ortho MD, pt with complicated involvement of left patellar tendon, requiring reconstruction in addition to knee replacement revision.  Patient with left knee immobilizer in place upon arrival.  Patient performs supine to sit with CGA and sit to stand with CGA.  Patient  performs gait x20 feet with rolling walker, CGA with noted unsteadiness during direction changes however does not require additional assistance.  Patient requires min assist for sit to supine transfer.  Patient will benefit from physical therapy to address deficits in functional mobility and activity tolerance.  Will await further ROM/exercise restrictions per ortho MD, discussed with nursing staff who reports call has been placed to ortho MD office.  -     Row Name 12/05/22 1448          Therapy Assessment/Plan (PT)    Patient/Family Therapy Goals Statement (PT) go home  -     Rehab Potential (PT) good, to achieve stated therapy goals  -     Criteria for Skilled Interventions Met (PT) yes;meets criteria  -     Therapy Frequency (PT) other (see comments)  1-2 additional visits  -     Predicted Duration of Therapy Intervention (PT) 1 day  -     Row Name 12/05/22 9480          Positioning and Restraints    Pre-Treatment Position in bed  -JW     Post Treatment Position bed  -JW     In Bed supine;call light within reach;encouraged to call for assist;notified nsg;with family/caregiver  -           User Key  (r) = Recorded By, (t) = Taken By, (c) = Cosigned By    Initials Name Provider Type    Mulu Dinh, PT Physical Therapist               Outcome Measures     Row Name 12/05/22 6064          How much help from another person do you currently need...    Turning from your back to your side while in flat bed without using bedrails? 3  -JW     Moving from lying on back to sitting on the side of a flat bed without bedrails? 3  -JW     Moving to and from a bed to a chair (including a wheelchair)? 3  -JW     Standing up from a chair using your arms (e.g., wheelchair, bedside chair)? 3  -JW     Climbing 3-5 steps with a railing? 2  -JW     To walk in hospital room? 3  -JW     AM-PAC 6 Clicks Score (PT) 17  -JW     Highest level of mobility 5 --> Static standing  -     Row Name 12/05/22 7280           Functional Assessment    Outcome Measure Options AM-PAC 6 Clicks Basic Mobility (PT)  -GINA           User Key  (r) = Recorded By, (t) = Taken By, (c) = Cosigned By    Initials Name Provider Type    Mulu Dinh PT Physical Therapist                             Physical Therapy Education     Title: PT OT SLP Therapies (In Progress)     Topic: Physical Therapy (In Progress)     Point: Mobility training (Done)     Learning Progress Summary           Patient Acceptance, E,TB, VU by GINA at 12/5/2022 1530                   Point: Home exercise program (Not Started)     Learner Progress:  Not documented in this visit.          Point: Precautions (Not Started)     Learner Progress:  Not documented in this visit.                      User Key     Initials Effective Dates Name Provider Type Discipline    GINA 06/16/21 -  Mluu Ordoñez PT Physical Therapist PT              PT Recommendation and Plan  Planned Therapy Interventions (PT): balance training, bed mobility training, gait training, home exercise program, patient/family education, strengthening, transfer training, stair training  Plan of Care Reviewed With: patient, spouse  Outcome Evaluation: Physical therapy evaluation complete.  Per operative note from ortho MD, pt with complicated involvement of left patellar tendon, requiring reconstruction in addition to knee replacement revision.  Patient with left knee immobilizer in place upon arrival.  Patient performs supine to sit with CGA and sit to stand with CGA.  Patient performs gait x20 feet with rolling walker, CGA with noted unsteadiness during direction changes however does not require additional assistance.  Patient requires min assist for sit to supine transfer.  Patient will benefit from physical therapy to address deficits in functional mobility and activity tolerance.  Will await further ROM/exercise restrictions per migel CONDE, discussed with nursing staff who reports call has been placed to ortho MD  office.     Time Calculation:    PT Charges     Row Name 12/05/22 1530             Time Calculation    Start Time 1445  -      Stop Time 1512  -      Time Calculation (min) 27 min  -GINA      PT Received On 12/05/22  -GINA      PT - Next Appointment 12/06/22  -GINA            User Key  (r) = Recorded By, (t) = Taken By, (c) = Cosigned By    Initials Name Provider Type    Mulu Dinh, HOLLEY Physical Therapist              Therapy Charges for Today     Code Description Service Date Service Provider Modifiers Qty    90010234017 HC PT EVAL LOW COMPLEXITY 2 12/5/2022 Mulu Ordoñez, PT GP 1          PT G-Codes  Outcome Measure Options: AM-PAC 6 Clicks Basic Mobility (PT)  AM-PAC 6 Clicks Score (PT): 17  PT Discharge Summary  Anticipated Discharge Disposition (PT): home with home health    Mulu Ordoñez, HOLLEY  12/5/2022     97

## 2023-01-02 ENCOUNTER — OUTPATIENT (OUTPATIENT)
Dept: OUTPATIENT SERVICES | Facility: HOSPITAL | Age: 73
LOS: 1 days | Discharge: HOME | End: 2023-01-02

## 2023-01-02 DIAGNOSIS — N60.02 SOLITARY CYST OF LEFT BREAST: Chronic | ICD-10-CM

## 2023-01-02 DIAGNOSIS — C50.919 MALIGNANT NEOPLASM OF UNSPECIFIED SITE OF UNSPECIFIED FEMALE BREAST: ICD-10-CM

## 2023-01-02 DIAGNOSIS — E89.0 POSTPROCEDURAL HYPOTHYROIDISM: Chronic | ICD-10-CM

## 2023-01-02 DIAGNOSIS — Z98.890 OTHER SPECIFIED POSTPROCEDURAL STATES: Chronic | ICD-10-CM

## 2023-01-02 DIAGNOSIS — Z90.10 ACQUIRED ABSENCE OF UNSPECIFIED BREAST AND NIPPLE: Chronic | ICD-10-CM

## 2023-01-06 ENCOUNTER — APPOINTMENT (OUTPATIENT)
Dept: HEMATOLOGY ONCOLOGY | Facility: CLINIC | Age: 73
End: 2023-01-06
Payer: COMMERCIAL

## 2023-01-06 ENCOUNTER — NON-APPOINTMENT (OUTPATIENT)
Age: 73
End: 2023-01-06

## 2023-01-06 VITALS
HEART RATE: 75 BPM | RESPIRATION RATE: 16 BRPM | OXYGEN SATURATION: 98 % | BODY MASS INDEX: 25.76 KG/M2 | HEIGHT: 61.97 IN | DIASTOLIC BLOOD PRESSURE: 77 MMHG | SYSTOLIC BLOOD PRESSURE: 128 MMHG | WEIGHT: 139.97 LBS | TEMPERATURE: 97.5 F

## 2023-01-06 PROCEDURE — 99214 OFFICE O/P EST MOD 30 MIN: CPT

## 2023-01-06 NOTE — PHYSICAL EXAM
[Fully active, able to carry on all pre-disease performance without restriction] : Status 0 - Fully active, able to carry on all pre-disease performance without restriction [Normal] : affect appropriate [de-identified] : s/p b/l mastectomy without reconstruction; no chest wall masses, no skin changes; prominence of right upper ribs (unchanged from childhood) ; minimal seroma of the left mastectomy site; no adenopathy  [de-identified] : no bullae, no ulceration or blisters,

## 2023-01-06 NOTE — REVIEW OF SYSTEMS
[Negative] : Allergic/Immunologic [Abdominal Pain] : no abdominal pain [Vomiting] : no vomiting [Constipation] : no constipation [Diarrhea] : no diarrhea [Skin Rash] : no skin rash [FreeTextEntry7] : + heartburn

## 2023-01-06 NOTE — HISTORY OF PRESENT ILLNESS
[Disease: _____________________] : Disease: [unfilled] [T: ___] : T[unfilled] [N: ___] : N[unfilled] [M: ___] : M[unfilled] [AJCC Stage: ____] : AJCC Stage: [unfilled] [de-identified] : Pt initially presented at age 63 in 2/2014 with right breast lump. Bilateral mammogram showed focal asymmetry in right lower outer quadrant with US demonstrating irregular mass measuring 2.8cm.  She had a right lumpectomy 4/2014 and ALND by Dr. Zunilda Yuen which showed a 2.6cm invasive ductal carcinoma grade 2 with LVI and PNI.  10/20 right axillary LN were positive for malignancy, +MARTI.  T2N3M0, stage IIIC.  ER 90%, NJ 60%, HER2 negative IHC 0 staining however CISH was amplified (2.4).  Post op pt received adjuvant chemotherapy with ddAC-THP.  Radiation therapy with [facility].  She started letrozole 2.5mg daily which she continues to present time.\par \par She was clinically CHRIS until 5/19/21 when she went for screening mammo/US which showed an irregular focal asymmetry within the right upper outer quadrant which corresponds to irregular solid mass on concomitant US at right 10:00 10cmfn, biopsy rec, BIRADS 4C.\par \par Pt had a right breast 10:00 10cmfn US guided biopsy on 5/24/21 which showed invasive mammary carcinoma, mere score 6/9 (3=2+1), invasive tumor measures at least 0.3cm, no DCIS, ER 90%, NJ 0%, HER2 negative 1+ IHC.\par \par Breast MRI on 5/27/2021 showed 0.9cm irregular mass in upper outer posterior right breast, corresponding to newly diagnosed malignancy; no evidence of multicentric or contralateral disease, BIRADS6.\par \par Pt underwent right breast mastectomy and prophylactic left breast mastectomy with Dr. Zunilda Yuen on 6/15/2021.  Left breast showed mild-proliferative fibrocystic changes.  Right breast mastectomy showed 10:00 invasive ductal carcinoma 6mm, Mere score 6/9 grade II (3+2+1), negative margins, ER 90%, NJ 0%, HER2 negative IHC 1+,  pT1b pNx\par \par Oncotype score 28. \par \par Patient has completed her 4 cycle course of adjuvant chemotherapy with taxotere and cytoxan on 9/23/21.  \par \par Started exemestane 25 mg po q day on 11/1/2021.\par \par Daughter with melanoma age 40.  maternal gpa lung cancer age 65, smoker.  Maternal aunt stomach cancer age 82.  Paternal gma unknown cancer age 30.  No ovary, endometrium, pancreatic cancer. Genetic testing INVITAE 4/28/2019 VUS MELVA, NBN, NF1.   [de-identified] : invasive ductal carcinoma, ER 90%, CT 0%, HER2 negative [de-identified] : 1/6/23\par Transferring care from Dr. Berumen to me today\par All of the patient's prior records including radiology, pathology and prior notes reviewed; Past Medical History, Past Surgical History, Family History and Social history reviewed and updated in the patient's chart. \par \par Patient presents today to rule out metastatic breast cancer and assess treatment toxicity.\par started exemestane 25 mg po q day on 11/1/2021 without incident\par denies chest wall masses or skin changes.\par Patient denies any SOB, CP, abdominal pain, bone pain, headache, or unexplained weight loss\par Patient denies any hotflashes, arthralgias, vaginal dryness, vaginal bleeding, hair loss, muscle cramps.\par Bone Density: 5/16/22 osteopenia femoral neck -1.8 , On Prolia every 6 months - due May 2023. Receives at [facility]\par Last saw breast surgeon Dr. Yuen fall 2023\par Labs reviewed from 3/4/22\par \par HEALTH MAINTENANCE \par PCP Dr. Agnes Plunkett\par OPHTHO will make appt\par MRI showed pancreatic cyst, will get report PH\par Gastrointestinal: Dr. Alvarez, Colonoscopy 9/2018 - 2 polyps, done 10/2021 - no polyps will get report. \par GYN Dr. Gerard Tyson , no vaginal bleeding or spotting\par COVID vaccine 3/24 Pfizer. s/p COVID booster left arm \par Back to work 3x/week and 2x at home, daughter is teacher\par \par Reviewed patient intake form:\par Patient does have diagnosis of hypertension followed by PCPon losartan\par Patient does NOT have diagnosis of diabetes: Last Hgb A1c - not available\par Patient has NOT fallen in the last 12 months:\par Patient is NOT a current smoker.\par Last flu vaccine:  10/21 - patient advised to have updated flu vaccine 2022\par LAST colonoscopy: 10/21 self reported\par LAST Mammogram: n/a - s/p b/l mastectomy\par

## 2023-01-06 NOTE — ASSESSMENT
[FreeTextEntry1] : SHADIA RIVAS has a h/o right breast invasive ductal carcinoma in 2/2014, stage IIIC, T2N3M0, ER 90%, CO 60%, HER2 negative HER2 negative IHC 0 staining however CISH was amplified (2.4), s/p right lumpectomy 4/2014 and ALND, adjuvant chemotherapy with ddAC-THP, s/p radiation therapy, on letrozole until the present time.  New primary right invasive ductal carcinoma 5/2021, 6mm, ER 90%, CO 0%, HER2 negative, pT1b pNx, Stage IA.  s/p right breast mastectomy and prophylactic left breast mastectomy.  Oncotype 28.  Pt completed adjuvant chemotherapy with TC chemotherapy on 9/23/21.\par \par -CHRIS 8.5 years from Her2 positive CA in 2014; CHRIS x 18 months for new primary ER+ Her2 negative CA in 5/2021\par -started exemestane on 11/1/2021, tolerating well.\par - next bone density due in 5/2024\par -continue to fu with Dr. Yuen as recommended \par - patient had labs with PMD in 12/2022; will obtain reports\par \par Osteoporosis\par -continue prolia q6mos for osteoporosis\par -labs to be done within 30 days of Prolia which she gets every 6 months; next due in May 2023\par -FU in 3-4mos - will see JONAH Wilson at next visit. \par \par PMD at least annually with lipid checks/bloodwork, gyn at least annually and PAP test screening per their recommendations, colon cancer screening/colonoscopy at least every 10 years as recommended by PMD/GI , dermatology for annual skin checks  ophthalmology for annual eye exams.\par Had last visit in 12/2022\par

## 2023-02-15 NOTE — H&P PST ADULT - ALCOHOL USE HISTORY SINGLE SELECT
Patient SpO2 consistently under 88% for minimum of 5 minutes  Placed on 1L NC  Will continue to monitor  yes...

## 2023-04-02 NOTE — HISTORY OF PRESENT ILLNESS
Start b12 injection   [de-identified] : Pt initially presented at age 63 in 2/2014 with right breast lump. Bilateral mammogram showed focal asymmetry in right lower outer quadrant with US demonstrating irregular mass measuring 2.8cm.  She had a right lumpectomy 4/2014 and ALND by Dr. Zunilda Yuen which showed a 2.6cm invasive ductal carcinoma grade 2 with LVI and PNI.  10/20 right axillary LN were positive for malignancy, +MARTI.  T2N3M0, stage IIIC.  ER 90%, WA 60%, HER2 negative IHC 0 staining however CISH was amplified (2.4).  Post op pt received adjuvant chemotherapy with ddAC-THP.  Radiation therapy with [facility].  She started letrozole 2.5mg daily which she continues to present time.\par \par She was clinically CHRIS until 5/19/21 when she went for screening mammo/US which showed an irregular focal asymmetry within the right upper outer quadrant which corresponds to irregular solid mass on concomitant US at right 10:00 10cmfn, biopsy rec, BIRADS 4C.\par \par Pt had a right breast 10:00 10cmfn US guided biopsy on 5/24/21 which showed invasive mammary carcinoma, mere score 6/9 (3=2+1), invasive tumor measures at least 0.3cm, no DCIS, ER 90%, WA 0%, HER2 negative 1+ IHC.\par \par Breast MRI on 5/27/2021 showed 0.9cm irregular mass in upper outer posterior right breast, corresponding to newly diagnosed malignancy; no evidence of multicentric or contralateral disease, BIRADS6.\par \par Pt underwent right breast mastectomy and prophylactic left breast mastectomy with Dr. Zunilda Yuen on 6/15/2021.  Left breast showed mild-proliferative fibrocystic changes.  Right breast mastectomy showed 10:00 invasive ductal carcinoma 6mm, Mere score 6/9 grade II (3+2+1), negative margins, ER 90%, WA 0%, HER2 negative IHC 1+,  pT1b pNx\par \par Oncotype score 28. \par \par Risk factors:Prior breast disease: as above.  Menarche: 14.  Postmenopausal at age 51.  Age of first pregnancy 21, two children, two pregnancies, no breast feeding. \par OCP 10 years, no other HRT. The patient is not of Ashkenazi ethnic background.  Family history is significant for maternal cousin age 35 breast cancer. Father had prostate cancer age 80.  Daughter with melanoma age 40.  maternal gpa lung cancer age 65, smoker.  Maternal aunt stomach cancer age 82.  Paternal gma unknown cancer age 30.  No ovary, endometrium, pancreatic cancer. Genetic testing INVITAE 4/28/2019 VUS MELVA, NBN, F1.   [FreeTextEntry1] : C1 TC 7/22/21\par C2 TC 8/12/2021\par C3 TC 9/2/2021 \par C4 TC 9/23/2021 [de-identified] : 10/5/21\par Patient seen by telehealth today because of redness of her left hand which started about 5 days ago but seems to be getting worse.  Patient had treatement with Taxotere and Cytoxan on 9/23/21; a peripheral line was started in the dorsum of the left hand; at the site of insertion of IV, patient noticed erythema and erythema spreading up the enings of her left forearm.  She denies any fever, chills.  She states the area is not very warm or tender to touch.  Denies any blistering of hands or purulent drainage.\par Patient does not recall any burning or discomfort at the time of infusion.  Reviewed nursing note from treatment day and no mention of any issues w/ IV.

## 2023-04-05 ENCOUNTER — OUTPATIENT (OUTPATIENT)
Dept: OUTPATIENT SERVICES | Facility: HOSPITAL | Age: 73
LOS: 1 days | Discharge: HOME | End: 2023-04-05

## 2023-04-05 DIAGNOSIS — N60.02 SOLITARY CYST OF LEFT BREAST: Chronic | ICD-10-CM

## 2023-04-05 DIAGNOSIS — E89.0 POSTPROCEDURAL HYPOTHYROIDISM: Chronic | ICD-10-CM

## 2023-04-05 DIAGNOSIS — Z90.10 ACQUIRED ABSENCE OF UNSPECIFIED BREAST AND NIPPLE: Chronic | ICD-10-CM

## 2023-04-05 DIAGNOSIS — Z98.890 OTHER SPECIFIED POSTPROCEDURAL STATES: Chronic | ICD-10-CM

## 2023-04-05 DIAGNOSIS — C50.919 MALIGNANT NEOPLASM OF UNSPECIFIED SITE OF UNSPECIFIED FEMALE BREAST: ICD-10-CM

## 2023-04-07 ENCOUNTER — RESULT REVIEW (OUTPATIENT)
Age: 73
End: 2023-04-07

## 2023-04-07 ENCOUNTER — APPOINTMENT (OUTPATIENT)
Dept: HEMATOLOGY ONCOLOGY | Facility: CLINIC | Age: 73
End: 2023-04-07
Payer: COMMERCIAL

## 2023-04-07 VITALS
TEMPERATURE: 97.1 F | WEIGHT: 142.86 LBS | RESPIRATION RATE: 16 BRPM | SYSTOLIC BLOOD PRESSURE: 132 MMHG | DIASTOLIC BLOOD PRESSURE: 88 MMHG | BODY MASS INDEX: 26.97 KG/M2 | OXYGEN SATURATION: 98 % | HEIGHT: 61 IN | HEART RATE: 82 BPM

## 2023-04-07 LAB
ALBUMIN SERPL ELPH-MCNC: 4.6 G/DL
ALP BLD-CCNC: 53 U/L
ALT SERPL-CCNC: 17 U/L
ANION GAP SERPL CALC-SCNC: 12 MMOL/L
AST SERPL-CCNC: 20 U/L
BASOPHILS # BLD AUTO: 0.08 K/UL — SIGNIFICANT CHANGE UP (ref 0–0.2)
BASOPHILS NFR BLD AUTO: 1 % — SIGNIFICANT CHANGE UP (ref 0–2)
BILIRUB SERPL-MCNC: 0.5 MG/DL
BUN SERPL-MCNC: 20 MG/DL
CALCIUM SERPL-MCNC: 10 MG/DL
CHLORIDE SERPL-SCNC: 104 MMOL/L
CO2 SERPL-SCNC: 26 MMOL/L
CREAT SERPL-MCNC: 0.87 MG/DL
EGFR: 71 ML/MIN/1.73M2
EOSINOPHIL # BLD AUTO: 0.13 K/UL — SIGNIFICANT CHANGE UP (ref 0–0.5)
EOSINOPHIL NFR BLD AUTO: 1.6 % — SIGNIFICANT CHANGE UP (ref 0–6)
GLUCOSE SERPL-MCNC: 100 MG/DL
HCT VFR BLD CALC: 42.5 % — SIGNIFICANT CHANGE UP (ref 34.5–45)
HGB BLD-MCNC: 14.4 G/DL — SIGNIFICANT CHANGE UP (ref 11.5–15.5)
IMM GRANULOCYTES NFR BLD AUTO: 0.5 % — SIGNIFICANT CHANGE UP (ref 0–0.9)
LYMPHOCYTES # BLD AUTO: 2.08 K/UL — SIGNIFICANT CHANGE UP (ref 1–3.3)
LYMPHOCYTES # BLD AUTO: 26.1 % — SIGNIFICANT CHANGE UP (ref 13–44)
MCHC RBC-ENTMCNC: 30.8 PG — SIGNIFICANT CHANGE UP (ref 27–34)
MCHC RBC-ENTMCNC: 33.9 G/DL — SIGNIFICANT CHANGE UP (ref 32–36)
MCV RBC AUTO: 90.8 FL — SIGNIFICANT CHANGE UP (ref 80–100)
MONOCYTES # BLD AUTO: 0.72 K/UL — SIGNIFICANT CHANGE UP (ref 0–0.9)
MONOCYTES NFR BLD AUTO: 9 % — SIGNIFICANT CHANGE UP (ref 2–14)
NEUTROPHILS # BLD AUTO: 4.93 K/UL — SIGNIFICANT CHANGE UP (ref 1.8–7.4)
NEUTROPHILS NFR BLD AUTO: 61.8 % — SIGNIFICANT CHANGE UP (ref 43–77)
NRBC # BLD: 0 /100 WBCS — SIGNIFICANT CHANGE UP (ref 0–0)
PLATELET # BLD AUTO: 330 K/UL — SIGNIFICANT CHANGE UP (ref 150–400)
POTASSIUM SERPL-SCNC: 4 MMOL/L
PROT SERPL-MCNC: 6.8 G/DL
RBC # BLD: 4.68 M/UL — SIGNIFICANT CHANGE UP (ref 3.8–5.2)
RBC # FLD: 13.2 % — SIGNIFICANT CHANGE UP (ref 10.3–14.5)
SODIUM SERPL-SCNC: 142 MMOL/L
WBC # BLD: 7.98 K/UL — SIGNIFICANT CHANGE UP (ref 3.8–10.5)
WBC # FLD AUTO: 7.98 K/UL — SIGNIFICANT CHANGE UP (ref 3.8–10.5)

## 2023-04-07 PROCEDURE — 99213 OFFICE O/P EST LOW 20 MIN: CPT

## 2023-04-07 RX ORDER — CEPHALEXIN 500 MG/1
500 TABLET ORAL
Qty: 14 | Refills: 0 | Status: COMPLETED | COMMUNITY
Start: 2022-09-19 | End: 2023-04-07

## 2023-04-07 NOTE — HISTORY OF PRESENT ILLNESS
[Disease: _____________________] : Disease: [unfilled] [T: ___] : T[unfilled] [N: ___] : N[unfilled] [M: ___] : M[unfilled] [AJCC Stage: ____] : AJCC Stage: [unfilled] [de-identified] : Pt initially presented at age 63 in 2/2014 with right breast lump. Bilateral mammogram showed focal asymmetry in right lower outer quadrant with US demonstrating irregular mass measuring 2.8cm.  She had a right lumpectomy 4/2014 and ALND by Dr. Zunilda Yuen which showed a 2.6cm invasive ductal carcinoma grade 2 with LVI and PNI.  10/20 right axillary LN were positive for malignancy, +MARTI.  T2N3M0, stage IIIC.  ER 90%, GA 60%, HER2 negative IHC 0 staining however CISH was amplified (2.4).  Post op pt received adjuvant chemotherapy with ddAC-THP.  Radiation therapy with [facility].  She started letrozole 2.5mg daily which she continues to present time.\par \par She was clinically CHRIS until 5/19/21 when she went for screening mammo/US which showed an irregular focal asymmetry within the right upper outer quadrant which corresponds to irregular solid mass on concomitant US at right 10:00 10cmfn, biopsy rec, BIRADS 4C.\par \par Pt had a right breast 10:00 10cmfn US guided biopsy on 5/24/21 which showed invasive mammary carcinoma, mere score 6/9 (3=2+1), invasive tumor measures at least 0.3cm, no DCIS, ER 90%, GA 0%, HER2 negative 1+ IHC.\par \par Breast MRI on 5/27/2021 showed 0.9cm irregular mass in upper outer posterior right breast, corresponding to newly diagnosed malignancy; no evidence of multicentric or contralateral disease, BIRADS6.\par \par Pt underwent right breast mastectomy and prophylactic left breast mastectomy with Dr. Zunilda Yuen on 6/15/2021.  Left breast showed mild-proliferative fibrocystic changes.  Right breast mastectomy showed 10:00 invasive ductal carcinoma 6mm, Mere score 6/9 grade II (3+2+1), negative margins, ER 90%, GA 0%, HER2 negative IHC 1+,  pT1b pNx\par \par Oncotype score 28. \par \par Patient has completed her 4 cycle course of adjuvant chemotherapy with taxotere and cytoxan on 9/23/21.  \par \par Started exemestane 25 mg po q day on 11/1/2021.\par \par Daughter with melanoma age 40.  maternal gpa lung cancer age 65, smoker.  Maternal aunt stomach cancer age 82.  Paternal gma unknown cancer age 30.  No ovary, endometrium, pancreatic cancer. Genetic testing INVITAE 4/28/2019 VUS MELVA, NBN, NF1.   [de-identified] : invasive ductal carcinoma, ER 90%, ME 0%, HER2 negative [de-identified] : 4/7/2023\par Patient presents today to rule out metastatic breast cancer and assess treatment toxicity.\par started exemestane 25 mg po q day on 11/1/2021 without incident\par denies chest wall masses or skin changes.\par Patient denies any SOB, CP, abdominal pain, bone pain, headache, or unexplained weight loss\par Patient denies any hotflashes, arthralgias, vaginal dryness, vaginal bleeding, hair loss, muscle cramps.\par Bone Density: 5/16/22 osteopenia femoral neck -1.8 , On Prolia every 6 months - due May 2023. Receives at [facility]\par Last saw breast surgeon Dr. Yuen fall 2023\par \par \par HEALTH MAINTENANCE \par PCP Dr. Agnes Plunkett\par OPHTHO will make appt\par MRI showed pancreatic cyst, will get report PH\par Gastrointestinal: Dr. Alvarez, Colonoscopy 9/2018 - 2 polyps, done 10/2021 - no polyps will get report. \par GYN Dr. Gerard Tyson , no vaginal bleeding or spotting\par COVID vaccine 3/24 Pfizer. s/p COVID booster left arm \par Back to work 3x/week and 2x at home, daughter is teacher\par \par Reviewed patient intake form:\par Patient does have diagnosis of hypertension followed by PCPon losartan\par Patient does NOT have diagnosis of diabetes: Last Hgb A1c - not available\par Patient has NOT fallen in the last 12 months:\par Patient is NOT a current smoker.\par Last flu vaccine:  10/21 - patient advised to have updated flu vaccine 2022\par LAST colonoscopy: 10/21 self reported\par LAST Mammogram: n/a - s/p b/l mastectomy\par

## 2023-04-07 NOTE — ASSESSMENT
[FreeTextEntry1] : SHADIA RIVAS has a h/o right breast invasive ductal carcinoma in 2/2014, stage IIIC, T2N3M0, ER 90%, MT 60%, HER2 negative HER2 negative IHC 0 staining however CISH was amplified (2.4), s/p right lumpectomy 4/2014 and ALND, adjuvant chemotherapy with ddAC-THP, s/p radiation therapy, on letrozole until the present time.  New primary right invasive ductal carcinoma 5/2021, 6mm, ER 90%, MT 0%, HER2 negative, pT1b pNx, Stage IA.  s/p right breast mastectomy and prophylactic left breast mastectomy.  Oncotype 28.  Pt completed adjuvant chemotherapy with TC chemotherapy on 9/23/21.\par \par -CHRIS 9 years from Her2 positive CA in 2014; CHRIS x 24 months for new primary ER+ Her2 negative CA in 5/2021\par -started exemestane on 11/1/2021, tolerating well.\par - next bone density due in 5/2024\par -continue to fu with Dr. Yuen as recommended \par - bw today\par \par Osteoporosis\par -continue prolia q6mos for osteoporosis\par -labs to be done within 30 days of Prolia which she gets every 6 months; next due in May 2023\par -FU in 6 months with Dr. Starr \par \par PMD at least annually with lipid checks/bloodwork, gyn at least annually and PAP test screening per their recommendations, colon cancer screening/colonoscopy at least every 10 years as recommended by PMD/GI , dermatology for annual skin checks  ophthalmology for annual eye exams.\par Had last visit in 12/2022\par

## 2023-04-07 NOTE — PHYSICAL EXAM
[Fully active, able to carry on all pre-disease performance without restriction] : Status 0 - Fully active, able to carry on all pre-disease performance without restriction [Normal] : affect appropriate [de-identified] : s/p b/l mastectomy without reconstruction; no chest wall masses, no skin changes; prominence of right upper ribs (unchanged from childhood) ; minimal seroma of the left mastectomy site; no adenopathy  [de-identified] : no bullae, no ulceration or blisters,

## 2023-05-11 DIAGNOSIS — C50.919 MALIGNANT NEOPLASM OF UNSPECIFIED SITE OF UNSPECIFIED FEMALE BREAST: ICD-10-CM

## 2023-05-15 ENCOUNTER — APPOINTMENT (OUTPATIENT)
Dept: INFUSION THERAPY | Facility: HOSPITAL | Age: 73
End: 2023-05-15

## 2023-05-15 DIAGNOSIS — C79.51 SECONDARY MALIGNANT NEOPLASM OF BONE: ICD-10-CM

## 2023-05-16 NOTE — PATIENT PROFILE ADULT - BRAND OF COVID-19 VACCINATION
Asthma Education with home action plan - topic(s) reviewed  [x] Rescue Medications   [x] Control Medications   [x] Disease Process   [x] Early Warning Signs    [x] Late Warning Signs   [x] Signs and Symptoms   [x] Triggers   [x] Daily Treatment Plan   [x] Parent Signature    [x] Follow Up Physician Appointment       [x] Proper Inhaler Use      [x] mom verbalizes understanding of all information presented.       Ginny Butterfield RCP  9:47 AM
Pfizer dose 1 and 2
(E4) spontaneous

## 2023-06-22 ENCOUNTER — OUTPATIENT (OUTPATIENT)
Dept: OUTPATIENT SERVICES | Facility: HOSPITAL | Age: 73
LOS: 1 days | Discharge: HOME | End: 2023-06-22

## 2023-06-22 DIAGNOSIS — Z90.10 ACQUIRED ABSENCE OF UNSPECIFIED BREAST AND NIPPLE: Chronic | ICD-10-CM

## 2023-06-22 DIAGNOSIS — E89.0 POSTPROCEDURAL HYPOTHYROIDISM: Chronic | ICD-10-CM

## 2023-06-22 DIAGNOSIS — N60.02 SOLITARY CYST OF LEFT BREAST: Chronic | ICD-10-CM

## 2023-06-22 DIAGNOSIS — C50.919 MALIGNANT NEOPLASM OF UNSPECIFIED SITE OF UNSPECIFIED FEMALE BREAST: ICD-10-CM

## 2023-06-22 DIAGNOSIS — Z98.890 OTHER SPECIFIED POSTPROCEDURAL STATES: Chronic | ICD-10-CM

## 2023-07-03 ENCOUNTER — APPOINTMENT (OUTPATIENT)
Dept: HEMATOLOGY ONCOLOGY | Facility: CLINIC | Age: 73
End: 2023-07-03
Payer: COMMERCIAL

## 2023-07-03 VITALS
BODY MASS INDEX: 26.66 KG/M2 | WEIGHT: 141.09 LBS | DIASTOLIC BLOOD PRESSURE: 81 MMHG | TEMPERATURE: 96.6 F | SYSTOLIC BLOOD PRESSURE: 125 MMHG | RESPIRATION RATE: 14 BRPM | HEART RATE: 59 BPM | OXYGEN SATURATION: 98 %

## 2023-07-03 PROCEDURE — 99214 OFFICE O/P EST MOD 30 MIN: CPT

## 2023-07-03 NOTE — HISTORY OF PRESENT ILLNESS
[Disease: _____________________] : Disease: [unfilled] [T: ___] : T[unfilled] [N: ___] : N[unfilled] [M: ___] : M[unfilled] [AJCC Stage: ____] : AJCC Stage: [unfilled] [de-identified] : Pt initially presented at age 63 in 2/2014 with right breast lump. Bilateral mammogram showed focal asymmetry in right lower outer quadrant with US demonstrating irregular mass measuring 2.8cm.  She had a right lumpectomy 4/2014 and ALND by Dr. Zunilda Yuen which showed a 2.6cm invasive ductal carcinoma grade 2 with LVI and PNI.  10/20 right axillary LN were positive for malignancy, +MARTI.  T2N3M0, stage IIIC.  ER 90%, IA 60%, HER2 negative IHC 0 staining however CISH was amplified (2.4).  Post op pt received adjuvant chemotherapy with ddAC-THP.  Radiation therapy with [facility].  She started letrozole 2.5mg daily which she continues to present time.\par \par She was clinically CHRIS until 5/19/21 when she went for screening mammo/US which showed an irregular focal asymmetry within the right upper outer quadrant which corresponds to irregular solid mass on concomitant US at right 10:00 10cmfn, biopsy rec, BIRADS 4C.\par \par Pt had a right breast 10:00 10cmfn US guided biopsy on 5/24/21 which showed invasive mammary carcinoma, mere score 6/9 (3=2+1), invasive tumor measures at least 0.3cm, no DCIS, ER 90%, IA 0%, HER2 negative 1+ IHC.\par \par Breast MRI on 5/27/2021 showed 0.9cm irregular mass in upper outer posterior right breast, corresponding to newly diagnosed malignancy; no evidence of multicentric or contralateral disease, BIRADS6.\par \par Pt underwent right breast mastectomy and prophylactic left breast mastectomy with Dr. Zunilda Yuen on 6/15/2021.  Left breast showed mild-proliferative fibrocystic changes.  Right breast mastectomy showed 10:00 invasive ductal carcinoma 6mm, Mere score 6/9 grade II (3+2+1), negative margins, ER 90%, IA 0%, HER2 negative IHC 1+,  pT1b pNx\par \par Oncotype score 28. \par \par Patient has completed her 4 cycle course of adjuvant chemotherapy with taxotere and cytoxan on 9/23/21.  \par \par Started exemestane 25 mg po q day on 11/1/2021.\par \par Daughter with melanoma age 40.  maternal gpa lung cancer age 65, smoker.  Maternal aunt stomach cancer age 82.  Paternal gma unknown cancer age 30.  No ovary, endometrium, pancreatic cancer. Genetic testing INV[facility]AE 4/28/2019 VUS MELVA, NBN, NF1.   [de-identified] : invasive ductal carcinoma, ER 90%, DC 0%, HER2 negative [de-identified] : 7/3/23\par Patient returns today to rule out progression or recurrence of breast cancer and to assess treatment toxicity. \par started exemestane 25 mg po q day on 11/1/2021 without incident; \par denies chest wall masses or skin changes; has discomfort with lateral chest wall loose skin\par \par Patient denies any SOB, CP, abdominal pain, bone pain, headache, or unexplained weight loss\par Patient denies any hotflashes, arthralgias, vaginal dryness, vaginal bleeding, hair loss, muscle cramps.\par Bone Density: 5/16/22 osteopenia femoral neck -1.8 , On Prolia every 6 months - due November 2023. [facility]ceives at [facility]\par Last saw breast surgeon Dr. Yuen fall 2023\par \par \par HEALTH MAINTENANCE \par PCP Dr. Agnes Plunkett\par OPHTHO will make appt\par MRI showed pancreatic cyst, will get report PH\par Gastrointestinal: Dr. Alvarez, Colonoscopy 9/2018 - 2 polyps, done 10/2021 - no polyps will get report. \par GYN Dr. eGrard Tyson , no vaginal bleeding or spotting\par COVID vaccine 3/24 Pfizer. s/p COVID booster left arm \par Back to work 3x/week and 2x at home, daughter is teacher\par \par Reviewed patient intake form:\par Patient does have diagnosis of hypertension followed by PCPon losartan\par Patient does NOT have diagnosis of diabetes: Last Hgb A1c - not available\par Patient has NOT fallen in the last 12 months:\par Patient is NOT a current smoker.\par Last flu vaccine:  10/21 - patient advised to have updated flu vaccine 2022\par LAST colonoscopy: 10/21 self reported\par LAST Mammogram: n/a - s/p b/l mastectomy\par

## 2023-07-03 NOTE — REVIEW OF SYSTEMS
[Negative] : Allergic/Immunologic [Abdominal Pain] : no abdominal pain [Vomiting] : no vomiting [Constipation] : no constipation [Skin Rash] : no skin rash [FreeTextEntry7] : + heartburn

## 2023-07-03 NOTE — PHYSICAL EXAM
[Fully active, able to carry on all pre-disease performance without restriction] : Status 0 - Fully active, able to carry on all pre-disease performance without restriction [Normal] : affect appropriate [de-identified] : s/p b/l mastectomy without reconstruction; no chest wall masses, no skin changes; prominence of right upper ribs (unchanged from childhood) ; minimal seroma of the left mastectomy site; no adenopathy  [de-identified] : no bullae, no ulceration or blisters,

## 2023-07-03 NOTE — ASSESSMENT
[FreeTextEntry1] : SHADIA RIVAS has a h/o right breast invasive ductal carcinoma in 2/2014, stage IIIC, T2N3M0, ER 90%, ID 60%, HER2 negative HER2 negative IHC 0 staining however CISH was amplified (2.4), s/p right lumpectomy 4/2014 and ALND, adjuvant chemotherapy with ddAC-THP, s/p radiation therapy, on letrozole until the present time.  New primary right invasive ductal carcinoma 5/2021, 6mm, ER 90%, ID 0%, HER2 negative, pT1b pNx, Stage IA.  s/p right breast mastectomy and prophylactic left breast mastectomy.  Oncotype 28.  Pt completed adjuvant chemotherapy with TC chemotherapy on 9/23/21.\par \par -CHRIS 9 years from Her2 positive CA in 2014; CHRIS x 24 months for new primary ER+ Her2 negative CA in 5/2021\par -started exemestane on 11/1/2021, tolerating well; planned for 10 years for therapy (11/2031)\par - next bone density due in 5/2024\par -continue to fu with Dr. Yuen as recommended \par - reviewed labs from 4/2023\par \par Osteoporosis\par -continue prolia q6mos for osteoporosis\par -labs to be done within 30 days of Prolia which she gets every 6 months; next due in November 2023\par \par \par PMD at least annually with lipid checks/bloodwork, gyn at least annually and PAP test screening per their recommendations, colon cancer screening/colonoscopy at least every 10 years as recommended by PMD/GI , dermatology for annual skin checks  ophthalmology for annual eye exams.\par Had last visit in 12/2022\par \par Patient had the opportunity to have all their questions answered to their satisfaction \par f/u in 6 months (10/2023); to have labs prior to Prolia in 11/2023
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
## 2023-09-05 ENCOUNTER — RX RENEWAL (OUTPATIENT)
Age: 73
End: 2023-09-05

## 2023-09-28 NOTE — ED PROVIDER NOTE - AXIS
September 28, 2023      Urgent Care - [address]  [facility] [address]  Phone: [phone]  Fax: [phone]       Patient: Nolberto Coates   YOB: 2008  Date of Visit: 09/28/2023    To Whom It May Concern:    Akil Coates  was at [facility] on 09/28/2023. The patient may return to work/school on 09/29/2023 with no restrictions. If you have any questions or concerns, or if I can be of further assistance, please do not hesitate to contact me.    Sincerely,    Ruma Weber M.D.     
Normal

## 2023-10-03 NOTE — PATIENT PROFILE ADULT - FUNCTIONAL SCREEN CURRENT LEVEL: SWALLOWING (IF SCORE 2 OR MORE FOR ANY ITEM, CONSULT REHAB SERVICES), MLM)
Routine guidance discussed with mom and 15-month handout given.  We will give Hib and MMR vaccines today and VIS given.  Great growth and development.  Next well exam at 18 months of age.   0 = swallows foods/liquids without difficulty

## 2023-10-12 ENCOUNTER — OUTPATIENT (OUTPATIENT)
Dept: OUTPATIENT SERVICES | Facility: HOSPITAL | Age: 73
LOS: 1 days | Discharge: HOME | End: 2023-10-12

## 2023-10-12 DIAGNOSIS — N60.02 SOLITARY CYST OF LEFT BREAST: Chronic | ICD-10-CM

## 2023-10-12 DIAGNOSIS — E89.0 POSTPROCEDURAL HYPOTHYROIDISM: Chronic | ICD-10-CM

## 2023-10-12 DIAGNOSIS — Z90.10 ACQUIRED ABSENCE OF UNSPECIFIED BREAST AND NIPPLE: Chronic | ICD-10-CM

## 2023-10-12 DIAGNOSIS — Z98.890 OTHER SPECIFIED POSTPROCEDURAL STATES: Chronic | ICD-10-CM

## 2023-10-12 DIAGNOSIS — C50.919 MALIGNANT NEOPLASM OF UNSPECIFIED SITE OF UNSPECIFIED FEMALE BREAST: ICD-10-CM

## 2023-10-13 ENCOUNTER — APPOINTMENT (OUTPATIENT)
Dept: HEMATOLOGY ONCOLOGY | Facility: CLINIC | Age: 73
End: 2023-10-13
Payer: COMMERCIAL

## 2023-10-13 ENCOUNTER — RESULT REVIEW (OUTPATIENT)
Age: 73
End: 2023-10-13

## 2023-10-13 VITALS
SYSTOLIC BLOOD PRESSURE: 134 MMHG | HEART RATE: 73 BPM | WEIGHT: 142.2 LBS | DIASTOLIC BLOOD PRESSURE: 69 MMHG | OXYGEN SATURATION: 98 % | TEMPERATURE: 97.2 F | BODY MASS INDEX: 26.87 KG/M2 | RESPIRATION RATE: 16 BRPM

## 2023-10-13 LAB
25(OH)D3 SERPL-MCNC: 30.1 NG/ML
ALBUMIN SERPL ELPH-MCNC: 4.5 G/DL
ALP BLD-CCNC: 53 U/L
ALT SERPL-CCNC: 17 U/L
ANION GAP SERPL CALC-SCNC: 12 MMOL/L
AST SERPL-CCNC: 20 U/L
BASOPHILS # BLD AUTO: 0.06 K/UL — SIGNIFICANT CHANGE UP (ref 0–0.2)
BASOPHILS # BLD AUTO: 0.06 K/UL — SIGNIFICANT CHANGE UP (ref 0–0.2)
BASOPHILS NFR BLD AUTO: 0.5 % — SIGNIFICANT CHANGE UP (ref 0–2)
BASOPHILS NFR BLD AUTO: 0.5 % — SIGNIFICANT CHANGE UP (ref 0–2)
BILIRUB SERPL-MCNC: 0.4 MG/DL
BUN SERPL-MCNC: 21 MG/DL
CALCIUM SERPL-MCNC: 10.1 MG/DL
CHLORIDE SERPL-SCNC: 105 MMOL/L
CHOLEST SERPL-MCNC: 207 MG/DL
CO2 SERPL-SCNC: 24 MMOL/L
CREAT SERPL-MCNC: 0.88 MG/DL
EGFR: 70 ML/MIN/1.73M2
EOSINOPHIL # BLD AUTO: 0.12 K/UL — SIGNIFICANT CHANGE UP (ref 0–0.5)
EOSINOPHIL # BLD AUTO: 0.12 K/UL — SIGNIFICANT CHANGE UP (ref 0–0.5)
EOSINOPHIL NFR BLD AUTO: 1.1 % — SIGNIFICANT CHANGE UP (ref 0–6)
EOSINOPHIL NFR BLD AUTO: 1.1 % — SIGNIFICANT CHANGE UP (ref 0–6)
GLUCOSE SERPL-MCNC: 99 MG/DL
HCT VFR BLD CALC: 42 % — SIGNIFICANT CHANGE UP (ref 34.5–45)
HCT VFR BLD CALC: 42 % — SIGNIFICANT CHANGE UP (ref 34.5–45)
HDLC SERPL-MCNC: 80 MG/DL
HGB BLD-MCNC: 13.9 G/DL — SIGNIFICANT CHANGE UP (ref 11.5–15.5)
HGB BLD-MCNC: 13.9 G/DL — SIGNIFICANT CHANGE UP (ref 11.5–15.5)
IMM GRANULOCYTES NFR BLD AUTO: 0.3 % — SIGNIFICANT CHANGE UP (ref 0–0.9)
IMM GRANULOCYTES NFR BLD AUTO: 0.3 % — SIGNIFICANT CHANGE UP (ref 0–0.9)
LDLC SERPL CALC-MCNC: 113 MG/DL
LYMPHOCYTES # BLD AUTO: 19.2 % — SIGNIFICANT CHANGE UP (ref 13–44)
LYMPHOCYTES # BLD AUTO: 19.2 % — SIGNIFICANT CHANGE UP (ref 13–44)
LYMPHOCYTES # BLD AUTO: 2.14 K/UL — SIGNIFICANT CHANGE UP (ref 1–3.3)
LYMPHOCYTES # BLD AUTO: 2.14 K/UL — SIGNIFICANT CHANGE UP (ref 1–3.3)
MCHC RBC-ENTMCNC: 30.4 PG — SIGNIFICANT CHANGE UP (ref 27–34)
MCHC RBC-ENTMCNC: 30.4 PG — SIGNIFICANT CHANGE UP (ref 27–34)
MCHC RBC-ENTMCNC: 33.1 G/DL — SIGNIFICANT CHANGE UP (ref 32–36)
MCHC RBC-ENTMCNC: 33.1 G/DL — SIGNIFICANT CHANGE UP (ref 32–36)
MCV RBC AUTO: 91.9 FL — SIGNIFICANT CHANGE UP (ref 80–100)
MCV RBC AUTO: 91.9 FL — SIGNIFICANT CHANGE UP (ref 80–100)
MONOCYTES # BLD AUTO: 0.98 K/UL — HIGH (ref 0–0.9)
MONOCYTES # BLD AUTO: 0.98 K/UL — HIGH (ref 0–0.9)
MONOCYTES NFR BLD AUTO: 8.8 % — SIGNIFICANT CHANGE UP (ref 2–14)
MONOCYTES NFR BLD AUTO: 8.8 % — SIGNIFICANT CHANGE UP (ref 2–14)
NEUTROPHILS # BLD AUTO: 7.81 K/UL — HIGH (ref 1.8–7.4)
NEUTROPHILS # BLD AUTO: 7.81 K/UL — HIGH (ref 1.8–7.4)
NEUTROPHILS NFR BLD AUTO: 70.1 % — SIGNIFICANT CHANGE UP (ref 43–77)
NEUTROPHILS NFR BLD AUTO: 70.1 % — SIGNIFICANT CHANGE UP (ref 43–77)
NONHDLC SERPL-MCNC: 127 MG/DL
NRBC # BLD: 0 /100 WBCS — SIGNIFICANT CHANGE UP (ref 0–0)
NRBC # BLD: 0 /100 WBCS — SIGNIFICANT CHANGE UP (ref 0–0)
PLATELET # BLD AUTO: 334 K/UL — SIGNIFICANT CHANGE UP (ref 150–400)
PLATELET # BLD AUTO: 334 K/UL — SIGNIFICANT CHANGE UP (ref 150–400)
POTASSIUM SERPL-SCNC: 4.5 MMOL/L
PROT SERPL-MCNC: 6.9 G/DL
RBC # BLD: 4.57 M/UL — SIGNIFICANT CHANGE UP (ref 3.8–5.2)
RBC # BLD: 4.57 M/UL — SIGNIFICANT CHANGE UP (ref 3.8–5.2)
RBC # FLD: 13.7 % — SIGNIFICANT CHANGE UP (ref 10.3–14.5)
RBC # FLD: 13.7 % — SIGNIFICANT CHANGE UP (ref 10.3–14.5)
SODIUM SERPL-SCNC: 141 MMOL/L
TRIGL SERPL-MCNC: 77 MG/DL
WBC # BLD: 11.14 K/UL — HIGH (ref 3.8–10.5)
WBC # BLD: 11.14 K/UL — HIGH (ref 3.8–10.5)
WBC # FLD AUTO: 11.14 K/UL — HIGH (ref 3.8–10.5)
WBC # FLD AUTO: 11.14 K/UL — HIGH (ref 3.8–10.5)

## 2023-10-13 PROCEDURE — 99214 OFFICE O/P EST MOD 30 MIN: CPT

## 2023-10-13 RX ORDER — EXEMESTANE 25 MG/1
25 TABLET, FILM COATED ORAL DAILY
Qty: 90 | Refills: 3 | Status: ACTIVE | COMMUNITY
Start: 2021-10-14 | End: 1900-01-01

## 2023-11-13 ENCOUNTER — APPOINTMENT (OUTPATIENT)
Dept: INFUSION THERAPY | Facility: HOSPITAL | Age: 73
End: 2023-11-13

## 2023-11-14 DIAGNOSIS — M81.0 AGE-RELATED OSTEOPOROSIS WITHOUT CURRENT PATHOLOGICAL FRACTURE: ICD-10-CM

## 2023-12-22 NOTE — REVIEW OF SYSTEMS
Patient is requesting medication refill. Please approve or deny this request.    Rx requested:  Requested Prescriptions     Pending Prescriptions Disp Refills    ALPRAZolam (XANAX) 0.5 MG tablet [Pharmacy Med Name: ALPRAZolam 0.5 MG Oral Tablet] 30 tablet 0     Sig: Take 1 tablet by mouth once daily         Last Office Visit:   7/17/2023      Next Visit Date:  Future Appointments   Date Time Provider Department Ward   1/12/2024 10:45 AM Luna Lester MD [facility] [place]   1/19/2024 11:00 AM MASON REEDER  2 RN MASON REEDER [facility]   2/7/2024  1:15 PM [facility]   4/8/2024 12:15 PM Gómez Robert MD [facility] [place]   7/17/2024 11:00 AM [facility] Wis[facility]-C [facility]   7/17/2024 11:30 AM Mini Wiseman PA-C [facility] [place]        [Patient Intake Form Reviewed] : Patient intake form was reviewed [Negative] : Allergic/Immunologic [Abdominal Pain] : no abdominal pain [Vomiting] : no vomiting [Constipation] : no constipation [Diarrhea] : no diarrhea [FreeTextEntry2] : PCP Dr. Agnes Plunkett, last seen 9/2021. COVID vaccine 3/24 Pfizer [FreeTextEntry7] : + heartburn [FreeTextEntry8] : Dr. Gerard Tyson pap smear 2/2021, normal.  [FreeTextEntry9] : Osteoporosis, on prolia.

## 2024-01-04 NOTE — PATIENT PROFILE ADULT - DO YOU FEEL LIKE HURTING YOURSELF OR OTHERS?
Hospice Sn 0-7 days assessment visit  Patient received supine in hospital bed with HOB elevated 45 degrees  Eyes closed, mouth breathing, respirations shallow  Skin cool and dry    breath sounds clear, no secretions noted  No response to verbal / tactile stimuli  Appears well palliated currently  Daughter is medicating patient  Q 2 hours with Morphine 10 mg with Haldol 1 mg with good effect  Educated daughter regarding the use of Levsin for secretions  Understanding verbalized  No oral intake  Daughter keeping patient's mouth moist only    PCG is in attendance  Education and support provided the daughter on S/S and care at EOL   Understanding verbalized  Informed daughterLaura, to call hospice 24/7 with concerns/ needs and at TOD no

## 2024-04-08 ENCOUNTER — OUTPATIENT (OUTPATIENT)
Dept: OUTPATIENT SERVICES | Facility: HOSPITAL | Age: 74
LOS: 1 days | Discharge: HOME | End: 2024-04-08

## 2024-04-08 DIAGNOSIS — Z98.890 OTHER SPECIFIED POSTPROCEDURAL STATES: Chronic | ICD-10-CM

## 2024-04-08 DIAGNOSIS — E89.0 POSTPROCEDURAL HYPOTHYROIDISM: Chronic | ICD-10-CM

## 2024-04-08 DIAGNOSIS — C50.919 MALIGNANT NEOPLASM OF UNSPECIFIED SITE OF UNSPECIFIED FEMALE BREAST: ICD-10-CM

## 2024-04-08 DIAGNOSIS — Z90.10 ACQUIRED ABSENCE OF UNSPECIFIED BREAST AND NIPPLE: Chronic | ICD-10-CM

## 2024-04-08 DIAGNOSIS — N60.02 SOLITARY CYST OF LEFT BREAST: Chronic | ICD-10-CM

## 2024-04-14 ENCOUNTER — NON-APPOINTMENT (OUTPATIENT)
Age: 74
End: 2024-04-14

## 2024-04-15 ENCOUNTER — APPOINTMENT (OUTPATIENT)
Dept: HEMATOLOGY ONCOLOGY | Facility: CLINIC | Age: 74
End: 2024-04-15
Payer: MEDICARE

## 2024-04-15 VITALS
HEART RATE: 71 BPM | WEIGHT: 142.2 LBS | DIASTOLIC BLOOD PRESSURE: 71 MMHG | OXYGEN SATURATION: 97 % | RESPIRATION RATE: 16 BRPM | BODY MASS INDEX: 26.87 KG/M2 | SYSTOLIC BLOOD PRESSURE: 125 MMHG | TEMPERATURE: 97.8 F

## 2024-04-15 DIAGNOSIS — Z79.899 OTHER LONG TERM (CURRENT) DRUG THERAPY: ICD-10-CM

## 2024-04-15 DIAGNOSIS — C50.911 MALIGNANT NEOPLASM OF UNSPECIFIED SITE OF RIGHT FEMALE BREAST: ICD-10-CM

## 2024-04-15 DIAGNOSIS — M81.0 AGE-RELATED OSTEOPOROSIS W/OUT CURRENT PATHOLOGICAL FRACTURE: ICD-10-CM

## 2024-04-15 PROCEDURE — G2211 COMPLEX E/M VISIT ADD ON: CPT

## 2024-04-15 PROCEDURE — 99214 OFFICE O/P EST MOD 30 MIN: CPT

## 2024-04-15 NOTE — PHYSICAL EXAM
[Fully active, able to carry on all pre-disease performance without restriction] : Status 0 - Fully active, able to carry on all pre-disease performance without restriction [Normal] : affect appropriate [de-identified] : s/p b/l mastectomy without reconstruction; no chest wall masses, no skin changes; prominence of right upper ribs (unchanged from childhood) ; no adenopathy

## 2024-04-15 NOTE — REVIEW OF SYSTEMS
[Diarrhea: Grade 0] : Diarrhea: Grade 0 [Negative] : Allergic/Immunologic [Abdominal Pain] : no abdominal pain [Vomiting] : no vomiting [Constipation] : no constipation [Skin Rash] : no skin rash

## 2024-04-15 NOTE — ASSESSMENT
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 New primary right invasive ductal carcinoma 5/2021, 6mm, ER 90%, AL 0%, HER2 negative, pT1b pNx, Stage IA. s/p right breast mastectomy and prophylactic left breast mastectomy. Oncotype 28. Pt completed adjuvant chemotherapy with TC chemotherapy on 9/23/21.  -CHRIS 9.5 years from Her2 positive CA in 2014; CHRIS x 3.0 years for new primary ER+ Her2 negative CA in 5/2021 -started exemestane on 11/1/2021, tolerating well; planned for 10 years for therapy (11/2031) -continue to fu with Dr. Yuen annually as recommended  Osteoporosis - next bone density due in 5/2024 -continue prolia q6mos for osteoporosis - due in 5/2024 - to be scheduled toay -labs today including chem panel  Patient had the opportunity to have all their questions answered to their satisfaction F/u in 6 months

## 2024-04-15 NOTE — HISTORY OF PRESENT ILLNESS
[Disease: _____________________] : Disease: [unfilled] [T: ___] : T[unfilled] [N: ___] : N[unfilled] [M: ___] : M[unfilled] [AJCC Stage: ____] : AJCC Stage: [unfilled] [Date: ____________] : Patient's last distress assessment performed on [unfilled]. [0 - No Distress] : Distress Level: 0 [100: Normal, no complaints, no evidence of disease.] : 100: Normal, no complaints, no evidence of disease. [de-identified] : Pt initially presented at age 63 in 2/2014 with right breast lump. Bilateral mammogram showed focal asymmetry in right lower outer quadrant with US demonstrating irregular mass measuring 2.8cm.  She had a right lumpectomy 4/2014 and ALND by Dr. Zunilda Yuen which showed a 2.6cm invasive ductal carcinoma grade 2 with LVI and PNI.  10/20 right axillary LN were positive for malignancy, +MARTI.  T2N3M0, stage IIIC.  ER 90%, RI 60%, HER2 negative IHC 0 staining however CISH was amplified (2.4).  Post op pt received adjuvant chemotherapy with ddAC-THP.  Radiation therapy with [facility].  She started letrozole 2.5mg daily which she continues to present time.\par  \par  She was clinically CHRIS until 5/19/21 when she went for screening mammo/US which showed an irregular focal asymmetry within the right upper outer quadrant which corresponds to irregular solid mass on concomitant US at right 10:00 10cmfn, biopsy rec, BIRADS 4C.\par  \par  Pt had a right breast 10:00 10cmfn US guided biopsy on 5/24/21 which showed invasive mammary carcinoma, mere score 6/9 (3=2+1), invasive tumor measures at least 0.3cm, no DCIS, ER 90%, RI 0%, HER2 negative 1+ IHC.\par  \par  Breast MRI on 5/27/2021 showed 0.9cm irregular mass in upper outer posterior right breast, corresponding to newly diagnosed malignancy; no evidence of multicentric or contralateral disease, BIRADS6.\par  \par  Pt underwent right breast mastectomy and prophylactic left breast mastectomy with Dr. Zunilda Yuen on 6/15/2021.  Left breast showed mild-proliferative fibrocystic changes.  Right breast mastectomy showed 10:00 invasive ductal carcinoma 6mm, Smallwood score 6/9 grade II (3+2+1), negative margins, ER 90%, RI 0%, HER2 negative IHC 1+,  pT1b pNx\par  \par  Oncotype score 28. \par  \par  Patient has completed her 4 cycle course of adjuvant chemotherapy with taxotere and cytoxan on 9/23/21.  \par  \par  Started exemestane 25 mg po q day on 11/1/2021.\par  \par  Daughter with melanoma age 40.  maternal gpa lung cancer age 65, smoker.  Maternal aunt stomach cancer age 82.  Paternal gma unknown cancer age 30.  No ovary, endometrium, pancreatic cancer. Genetic testing [facility] 4/28/2019 VUS MELVA, NBN, NF1.   [de-identified] : invasive ductal carcinoma, ER 90%, ND 0%, HER2 negative [de-identified] : 4/15/24 Patient returns today to rule out progression or recurrence of breast cancer and to assess treatment toxicity.  started exemestane 25 mg po q day on 11/1/2021 without incident;  denies chest wall masses or skin changes; has discomfort with lateral chest wall loose skin  Patient denies any SOB, CP, abdominal pain, bone pain, headache, or unexplained weight loss Patient denies any hotflashes, arthralgias, vaginal dryness, vaginal bleeding, hair loss, muscle cramps. Bone Density: 5/16/22 osteopenia femoral neck -1.8 , On Prolia every 6 months - last November 2023. [facility]ceives at [facility] Last saw breast surgeon Dr. Yuen fall 2023   HEALTH MAINTENANCE  PCP Dr. Agnes Trotter-David GRANADOS will make appt MRI showed pancreatic cyst, will get report [facility] Gastrointestinal: Dr. Alvarez, Colonoscopy 9/2018 - 2 polyps, done 10/2021 - no polyps will get report.  GYN Dr. Gerard Tyson , no vaginal bleeding or spotting COVID vaccine 3/24 Pfizer. s/p COVID booster left arm  Back to work 3x/week and 2x at home, daughter is teacher  Reviewed patient intake form: Patient does have diagnosis of hypertension followed by PCP on losartan Patient does NOT have diagnosis of diabetes: Last Hgb A1c - not available Patient has NOT fallen in the last 12 months: Patient is NOT a current smoker. Last flu vaccine:  10/21 - patient advised to have updated flu vaccine 2022 LAST colonoscopy: 10/21 self reported LAST Mammogram: n/a - s/p b/l mastectomy

## 2024-04-22 NOTE — H&P ADULT - NSRESEARCHGRANTASSESS_GEN_A_CORE
Cereve , Cetaphil, Vanicream, Eucerin - want products w/ ceremides   Not applicable: This is a surgical and/or non-medical patient

## 2024-04-29 ENCOUNTER — RESULT REVIEW (OUTPATIENT)
Age: 74
End: 2024-04-29

## 2024-04-29 ENCOUNTER — TRANSCRIPTION ENCOUNTER (OUTPATIENT)
Age: 74
End: 2024-04-29

## 2024-04-29 ENCOUNTER — APPOINTMENT (OUTPATIENT)
Dept: HEMATOLOGY ONCOLOGY | Facility: CLINIC | Age: 74
End: 2024-04-29

## 2024-04-29 ENCOUNTER — APPOINTMENT (OUTPATIENT)
Dept: SURGERY | Facility: CLINIC | Age: 74
End: 2024-04-29
Payer: MEDICARE

## 2024-04-29 LAB
BASOPHILS # BLD AUTO: 0.08 K/UL — SIGNIFICANT CHANGE UP (ref 0–0.2)
BASOPHILS NFR BLD AUTO: 0.9 % — SIGNIFICANT CHANGE UP (ref 0–2)
EOSINOPHIL # BLD AUTO: 0.12 K/UL — SIGNIFICANT CHANGE UP (ref 0–0.5)
EOSINOPHIL NFR BLD AUTO: 1.3 % — SIGNIFICANT CHANGE UP (ref 0–6)
HCT VFR BLD CALC: 41.8 % — SIGNIFICANT CHANGE UP (ref 34.5–45)
HGB BLD-MCNC: 14.1 G/DL — SIGNIFICANT CHANGE UP (ref 11.5–15.5)
IMM GRANULOCYTES NFR BLD AUTO: 0.3 % — SIGNIFICANT CHANGE UP (ref 0–0.9)
LYMPHOCYTES # BLD AUTO: 2.17 K/UL — SIGNIFICANT CHANGE UP (ref 1–3.3)
LYMPHOCYTES # BLD AUTO: 23.3 % — SIGNIFICANT CHANGE UP (ref 13–44)
MCHC RBC-ENTMCNC: 31.1 PG — SIGNIFICANT CHANGE UP (ref 27–34)
MCHC RBC-ENTMCNC: 33.7 G/DL — SIGNIFICANT CHANGE UP (ref 32–36)
MCV RBC AUTO: 92.3 FL — SIGNIFICANT CHANGE UP (ref 80–100)
MONOCYTES # BLD AUTO: 0.8 K/UL — SIGNIFICANT CHANGE UP (ref 0–0.9)
MONOCYTES NFR BLD AUTO: 8.6 % — SIGNIFICANT CHANGE UP (ref 2–14)
NEUTROPHILS # BLD AUTO: 6.1 K/UL — SIGNIFICANT CHANGE UP (ref 1.8–7.4)
NEUTROPHILS NFR BLD AUTO: 65.6 % — SIGNIFICANT CHANGE UP (ref 43–77)
NRBC # BLD: 0 /100 WBCS — SIGNIFICANT CHANGE UP (ref 0–0)
PLATELET # BLD AUTO: 325 K/UL — SIGNIFICANT CHANGE UP (ref 150–400)
RBC # BLD: 4.53 M/UL — SIGNIFICANT CHANGE UP (ref 3.8–5.2)
RBC # FLD: 13.9 % — SIGNIFICANT CHANGE UP (ref 10.3–14.5)
WBC # BLD: 9.3 K/UL — SIGNIFICANT CHANGE UP (ref 3.8–10.5)
WBC # FLD AUTO: 9.3 K/UL — SIGNIFICANT CHANGE UP (ref 3.8–10.5)

## 2024-04-29 PROCEDURE — 99213K: CUSTOM

## 2024-04-30 LAB
ALBUMIN SERPL ELPH-MCNC: 4.5 G/DL
ALP BLD-CCNC: 59 U/L
ALT SERPL-CCNC: 23 U/L
ANION GAP SERPL CALC-SCNC: 14 MMOL/L
AST SERPL-CCNC: 24 U/L
BILIRUB SERPL-MCNC: 0.4 MG/DL
BUN SERPL-MCNC: 19 MG/DL
CALCIUM SERPL-MCNC: 9.7 MG/DL
CHLORIDE SERPL-SCNC: 105 MMOL/L
CO2 SERPL-SCNC: 22 MMOL/L
CREAT SERPL-MCNC: 0.86 MG/DL
EGFR: 71 ML/MIN/1.73M2
GLUCOSE SERPL-MCNC: 103 MG/DL
POTASSIUM SERPL-SCNC: 4.4 MMOL/L
PROT SERPL-MCNC: 6.8 G/DL
SODIUM SERPL-SCNC: 141 MMOL/L

## 2024-05-13 ENCOUNTER — APPOINTMENT (OUTPATIENT)
Dept: INFUSION THERAPY | Facility: HOSPITAL | Age: 74
End: 2024-05-13

## 2024-05-14 DIAGNOSIS — C79.51 SECONDARY MALIGNANT NEOPLASM OF BONE: ICD-10-CM

## 2024-06-06 ENCOUNTER — APPOINTMENT (OUTPATIENT)
Dept: RADIOLOGY | Facility: IMAGING CENTER | Age: 74
End: 2024-06-06
Payer: MEDICARE

## 2024-06-06 ENCOUNTER — OUTPATIENT (OUTPATIENT)
Dept: OUTPATIENT SERVICES | Facility: HOSPITAL | Age: 74
LOS: 1 days | End: 2024-06-06
Payer: MEDICARE

## 2024-06-06 DIAGNOSIS — N60.02 SOLITARY CYST OF LEFT BREAST: Chronic | ICD-10-CM

## 2024-06-06 DIAGNOSIS — Z90.10 ACQUIRED ABSENCE OF UNSPECIFIED BREAST AND NIPPLE: Chronic | ICD-10-CM

## 2024-06-06 DIAGNOSIS — Z98.890 OTHER SPECIFIED POSTPROCEDURAL STATES: Chronic | ICD-10-CM

## 2024-06-06 DIAGNOSIS — E89.0 POSTPROCEDURAL HYPOTHYROIDISM: Chronic | ICD-10-CM

## 2024-06-06 DIAGNOSIS — M81.0 AGE-RELATED OSTEOPOROSIS WITHOUT CURRENT PATHOLOGICAL FRACTURE: ICD-10-CM

## 2024-06-06 PROCEDURE — 77080 DXA BONE DENSITY AXIAL: CPT | Mod: 26

## 2024-06-06 PROCEDURE — 77080 DXA BONE DENSITY AXIAL: CPT

## 2024-06-11 ENCOUNTER — APPOINTMENT (OUTPATIENT)
Dept: ORTHOPEDIC SURGERY | Facility: CLINIC | Age: 74
End: 2024-06-11
Payer: MEDICARE

## 2024-06-11 VITALS — WEIGHT: 142 LBS | BODY MASS INDEX: 26.81 KG/M2 | HEIGHT: 61 IN

## 2024-06-11 DIAGNOSIS — M17.12 UNILATERAL PRIMARY OSTEOARTHRITIS, LEFT KNEE: ICD-10-CM

## 2024-06-11 PROCEDURE — J3490M: CUSTOM | Mod: NC

## 2024-06-11 PROCEDURE — 20610 DRAIN/INJ JOINT/BURSA W/O US: CPT

## 2024-06-11 PROCEDURE — 99204 OFFICE O/P NEW MOD 45 MIN: CPT | Mod: 25

## 2024-06-11 PROCEDURE — 73564 X-RAY EXAM KNEE 4 OR MORE: CPT | Mod: LT

## 2024-06-11 NOTE — ASSESSMENT
[FreeTextEntry1] : Left X-Ray Examination of the KNEE (4 views): medial and patellofemoral degenerate changes.   - We discussed their diagnosis and treatment options at length including the risks and benefits of both surgical treatment with a knee replacement and non-surgical options. Surgical risks include but are not limited to pain, infection, bleeding, vascular injury, numbness, tingling, nerve damage. - Due to risks of surgery, we will continue conservative treatment with PT, icing, and anti-inflammatory medications - The patient was provided with a PT prescription to work on ROM, hip ER/abductors strengthening, quad/hamstring stretches and strengthening, and other exercises - The patient was advised to let pain guide the gradual advancement of activities. - The patient was advised to apply ice (wrapped in a towel or protective covering) to the area daily (20 minutes at a time, 2-4X/day). - We also discussed the possible of a corticosteroid injection in order to help decrease inflammation and pain so that they can perform better therapy. - The risks, benefits, and alternatives to corticosteroid injection were reviewed with the patient and they wished to proceed with this treatment course. - Follow up as needed in 6 weeks to re-evaluate, if no improvement we spoke about possibility of viscosupplementation injections    Medication Discussion: 1) We discussed a comprehensive treatment plan that included possible pharmaceutical management involving the use of prescription strength medications including but not limited to options such as oral Naprosyn 500mg BID, once daily Meloxicam 15 mg, or 500-650 mg Tylenol versus over the counter oral medications in addition to discussing possible topical prescription Pennsaid vs  Voltaren gel. 2) There is a moderate risk of morbidity with further treatment, especially from use of prescription strength medications and possible side effects of these medications which include but are not limited to upset stomach with oral medications, skin reactions to topical medications and GI/cardiac/renal issues with long term use. 3) I recommended that the patient follow-up with their medical physician if there are any significant potential issues with long term medication use such as interactions with current medications or with exacerbation of underlying medical comorbidities. 4) The benefits and risks associated with use of oral and / or topical prescription and over the counter anti-inflammatory medications were discussed with the patient. The patient voiced understanding of the risks including but not limited to bleeding, stroke, kidney dysfunction, heart disease, and were referred to the black box warning label for further information.

## 2024-06-11 NOTE — HISTORY OF PRESENT ILLNESS
[de-identified] : 74 year old female  ( retired )   chronic Left knee pain intermittne for years, worsening since 6/6/24 while going up stairs, The pain is located  posterior, ant and deep The pain is associated with  swelling, stiffness Worse with stairs activity and better at rest. Has tried icing, Naproxen

## 2024-06-11 NOTE — IMAGING
[de-identified] :  LEFT KNEE Inspection:  mild effusion, pop cyst Palpation: medial joint line tenderness, anterior tenderness Knee Range of Motion:  3-125  Strength: 5/5 Quadriceps strength, 5/5 Hamstring strength Neurological: light touch is intact throughout Ligament Stability and Special Tests:  McMurrays: neg Lachman: neg Pivot Shift: neg Posterior Drawer: neg Valgus: neg Varus: neg Patella Apprehension: neg Patella Maltracking: neg

## 2024-10-22 ENCOUNTER — OUTPATIENT (OUTPATIENT)
Dept: OUTPATIENT SERVICES | Facility: HOSPITAL | Age: 74
LOS: 1 days | Discharge: HOME | End: 2024-10-22

## 2024-10-22 DIAGNOSIS — C50.919 MALIGNANT NEOPLASM OF UNSPECIFIED SITE OF UNSPECIFIED FEMALE BREAST: ICD-10-CM

## 2024-10-22 DIAGNOSIS — Z98.890 OTHER SPECIFIED POSTPROCEDURAL STATES: Chronic | ICD-10-CM

## 2024-10-22 DIAGNOSIS — Z90.10 ACQUIRED ABSENCE OF UNSPECIFIED BREAST AND NIPPLE: Chronic | ICD-10-CM

## 2024-10-22 DIAGNOSIS — E89.0 POSTPROCEDURAL HYPOTHYROIDISM: Chronic | ICD-10-CM

## 2024-10-22 DIAGNOSIS — N60.02 SOLITARY CYST OF LEFT BREAST: Chronic | ICD-10-CM

## 2024-10-23 ENCOUNTER — NON-APPOINTMENT (OUTPATIENT)
Age: 74
End: 2024-10-23

## 2024-10-28 ENCOUNTER — NON-APPOINTMENT (OUTPATIENT)
Age: 74
End: 2024-10-28

## 2024-10-28 ENCOUNTER — APPOINTMENT (OUTPATIENT)
Dept: HEMATOLOGY ONCOLOGY | Facility: CLINIC | Age: 74
End: 2024-10-28
Payer: MEDICARE

## 2024-10-28 ENCOUNTER — RESULT REVIEW (OUTPATIENT)
Age: 74
End: 2024-10-28

## 2024-10-28 VITALS
BODY MASS INDEX: 25.83 KG/M2 | OXYGEN SATURATION: 99 % | TEMPERATURE: 97 F | DIASTOLIC BLOOD PRESSURE: 87 MMHG | RESPIRATION RATE: 16 BRPM | HEART RATE: 70 BPM | WEIGHT: 136.68 LBS | SYSTOLIC BLOOD PRESSURE: 154 MMHG

## 2024-10-28 DIAGNOSIS — M81.0 AGE-RELATED OSTEOPOROSIS W/OUT CURRENT PATHOLOGICAL FRACTURE: ICD-10-CM

## 2024-10-28 DIAGNOSIS — C50.911 MALIGNANT NEOPLASM OF UNSPECIFIED SITE OF RIGHT FEMALE BREAST: ICD-10-CM

## 2024-10-28 LAB
BASOPHILS # BLD AUTO: 0.06 K/UL — SIGNIFICANT CHANGE UP (ref 0–0.2)
BASOPHILS NFR BLD AUTO: 0.8 % — SIGNIFICANT CHANGE UP (ref 0–2)
EOSINOPHIL # BLD AUTO: 0.14 K/UL — SIGNIFICANT CHANGE UP (ref 0–0.5)
EOSINOPHIL NFR BLD AUTO: 1.9 % — SIGNIFICANT CHANGE UP (ref 0–6)
HCT VFR BLD CALC: 42.2 % — SIGNIFICANT CHANGE UP (ref 34.5–45)
HGB BLD-MCNC: 14.3 G/DL — SIGNIFICANT CHANGE UP (ref 11.5–15.5)
IMM GRANULOCYTES NFR BLD AUTO: 0.3 % — SIGNIFICANT CHANGE UP (ref 0–0.9)
LYMPHOCYTES # BLD AUTO: 1.92 K/UL — SIGNIFICANT CHANGE UP (ref 1–3.3)
LYMPHOCYTES # BLD AUTO: 25.5 % — SIGNIFICANT CHANGE UP (ref 13–44)
MCHC RBC-ENTMCNC: 31.1 PG — SIGNIFICANT CHANGE UP (ref 27–34)
MCHC RBC-ENTMCNC: 33.9 G/DL — SIGNIFICANT CHANGE UP (ref 32–36)
MCV RBC AUTO: 91.7 FL — SIGNIFICANT CHANGE UP (ref 80–100)
MONOCYTES # BLD AUTO: 0.8 K/UL — SIGNIFICANT CHANGE UP (ref 0–0.9)
MONOCYTES NFR BLD AUTO: 10.6 % — SIGNIFICANT CHANGE UP (ref 2–14)
NEUTROPHILS # BLD AUTO: 4.58 K/UL — SIGNIFICANT CHANGE UP (ref 1.8–7.4)
NEUTROPHILS NFR BLD AUTO: 60.9 % — SIGNIFICANT CHANGE UP (ref 43–77)
NRBC # BLD: 0 /100 WBCS — SIGNIFICANT CHANGE UP (ref 0–0)
NRBC BLD-RTO: 0 /100 WBCS — SIGNIFICANT CHANGE UP (ref 0–0)
PLATELET # BLD AUTO: 331 K/UL — SIGNIFICANT CHANGE UP (ref 150–400)
RBC # BLD: 4.6 M/UL — SIGNIFICANT CHANGE UP (ref 3.8–5.2)
RBC # FLD: 13.2 % — SIGNIFICANT CHANGE UP (ref 10.3–14.5)
WBC # BLD: 7.52 K/UL — SIGNIFICANT CHANGE UP (ref 3.8–10.5)
WBC # FLD AUTO: 7.52 K/UL — SIGNIFICANT CHANGE UP (ref 3.8–10.5)

## 2024-10-28 PROCEDURE — G2211 COMPLEX E/M VISIT ADD ON: CPT

## 2024-10-28 PROCEDURE — 99214 OFFICE O/P EST MOD 30 MIN: CPT

## 2024-11-07 NOTE — H&P ADULT - PROBLEM SELECTOR PROBLEM 4
Patient called and states [facility] does not take her insurance. [facility] Is [facility] okay?     Breast cancer Diarrhea

## 2024-11-11 ENCOUNTER — APPOINTMENT (OUTPATIENT)
Dept: HEMATOLOGY ONCOLOGY | Facility: CLINIC | Age: 74
End: 2024-11-11

## 2024-11-11 ENCOUNTER — APPOINTMENT (OUTPATIENT)
Dept: INFUSION THERAPY | Facility: HOSPITAL | Age: 74
End: 2024-11-11

## 2024-11-12 DIAGNOSIS — M81.0 AGE-RELATED OSTEOPOROSIS WITHOUT CURRENT PATHOLOGICAL FRACTURE: ICD-10-CM

## 2024-12-24 PROBLEM — F10.90 ALCOHOL USE: Status: ACTIVE | Noted: 2021-07-09

## 2025-03-26 NOTE — ASU PATIENT PROFILE, ADULT - PMH
History of Present Illness    CHIEF COMPLAINT:  Sammi presents for follow-up of COPD exacerbation and management of cardiac issues.    HPI:  Sammi has been under observation for COPD for the last 2 weeks. She reports nocturnal dyspnea and increased difficulty climbing stairs. She has been using an albuterol inhaler but has not been compliant with other prescribed medications. She uses Budesonide only for severe attacks and has not been using prednisone or Duoneb as prescribed. She reports ankle swelling. Her weight has been stable. Her previous X-rays and echo have shown mild pulmonary artery pressure over 37. She has a pending appointment with Dr. Cheng, who suggests cardiac catheterization and right heart catheterization.    She denies fever, chills, chest pain, orthopnea, hemoptysis, melena, dysuria, joint swelling, depression, and hallucinations.    MEDICATIONS:  Sammi is on Albuterol inhaler as needed for COPD. She is using Budesonide inhaler only for severe attacks, not as prescribed. Prednisone and Duoneb are not being used as prescribed. She is taking Spironolactone 3 times per week and Lasix daily. Sammi was previously prescribed Budesonide inhaler, Prednisone, and Duoneb for regular use, but is now only using Budesonide for severe attacks and not using Prednisone or Duoneb as prescribed.    MEDICAL HISTORY:  Sammi has a history of COPD and obesity.    TEST RESULTS:  BMP, Magnesium, and Neuropeptide tests are pending. A follow-up is recommended for Potassium levels.    IMAGING:  Sammi has had a previous Chest XR. A previous Echocardiogram showed mild PA pressure over 37.      ROS:  General: -fever, -chills, -fatigue, -weight gain, -weight loss  Eyes: -vision changes, -redness, -discharge  ENT: -ear pain, -nasal congestion, -sore throat  Cardiovascular: -chest pain, -palpitations, +lower extremity edema, +paroxysmal nocturnal dyspnea  Respiratory: -cough, -shortness of breath  Gastrointestinal:  -abdominal pain, -nausea, -vomiting, -diarrhea, -constipation, -blood in stool  Genitourinary: -dysuria, -hematuria, -frequency  Musculoskeletal: -joint pain, -muscle pain  Skin: -rash, -lesion  Neurological: -headache, -dizziness, -numbness, -tingling  Psychiatric: -anxiety, -depression, -sleep difficulty     Physical Exam  BP (!) 118/58 (BP Location: Left arm, Patient Position: Sitting)   Pulse 62   Wt 103.5 kg (228 lb 4.6 oz)   SpO2 99%   BMI 40.44 kg/m²       The patient appears alert, well appearing, and in no distress, oriented to person, place, and time, and morbidly obese. ENT: ENT exam normal, no neck nodes or sinus tenderness, neck without nodes, throat normal without erythema or exudate, and nasal mucosa congested. Chest:clear to auscultation, no wheezes, rales or rhonchi, symmetric air entry, no tachypnea, retractions or cyanosis. Heart sounds are normal. Abdomen soft, nontender, no masses or organomegaly.  Lab Results   Component Value Date    WBC 6.11 03/10/2025    HGB 12.2 03/10/2025    HCT 40.3 03/10/2025     03/10/2025    CHOL 172 03/10/2025    TRIG 131 03/10/2025    HDL 38 (L) 03/10/2025    ALT 15 03/10/2025    AST 29 03/10/2025     03/10/2025    K 4.3 03/10/2025     03/10/2025    CREATININE 1.1 03/10/2025    BUN 20 03/10/2025    CO2 27 03/10/2025    TSH 3.261 08/04/2020    INR 2.1 03/13/2025    HGBA1C 5.8 (H) 03/10/2025     1. SOB (shortness of breath)    2. Hypertension associated with diabetes  -     magnesium oxide (MAG-OX) 400 mg (241.3 mg magnesium) tablet; Take 1 tablet (400 mg total) by mouth 2 (two) times daily.  Dispense: 180 tablet; Refill: 3    3. Severe obesity (BMI >= 40)    4. Pulmonary hypertension    5. Severe persistent asthma without complication  -     predniSONE (DELTASONE) 20 MG tablet; Take one daily for 5 days and may repeat for shortness of breath.  Dispense: 21 tablet; Refill: 3  -     budesonide (PULMICORT) 0.5 mg/2 mL nebulizer solution; Take 2 mLs  (0.5 mg total) by nebulization 2 (two) times daily. Controller  Dispense: 60 mL; Refill: 3    6. Stage 3b chronic kidney disease  -     B-TYPE NATRIURETIC PEPTIDE; Future; Expected date: 03/17/2025  -     Ferritin; Future; Expected date: 03/17/2025  -     Basic Metabolic Panel; Future; Expected date: 03/17/2025  -     Magnesium; Future; Expected date: 03/17/2025  -     dexAMETHasone injection 4 mg  -     calcitRIOL (ROCALTROL) 0.25 MCG Cap; Take 1 capsule (0.25 mcg total) by mouth once daily.  Dispense: 90 capsule; Refill: 3  -     PTH, Intact; Future; Expected date: 03/17/2025    7. COPD mixed type  -     B-TYPE NATRIURETIC PEPTIDE; Future; Expected date: 03/17/2025  -     Ferritin; Future; Expected date: 03/17/2025  -     Basic Metabolic Panel; Future; Expected date: 03/17/2025  -     Magnesium; Future; Expected date: 03/17/2025  -     dexAMETHasone injection 4 mg    8. Moderate persistent asthma without complication  -     albuterol (PROVENTIL/VENTOLIN HFA) 90 mcg/actuation inhaler; 2 puffs every 4 hours as needed for cough, wheeze, or shortness of breath  Dispense: 18 g; Refill: 3    9. Asthma-COPD overlap syndrome  -     albuterol (PROVENTIL/VENTOLIN HFA) 90 mcg/actuation inhaler; 2 puffs every 4 hours as needed for cough, wheeze, or shortness of breath  Dispense: 18 g; Refill: 3    10. Hyperparathyroidism , secondary, non-renal  -     calcitRIOL (ROCALTROL) 0.25 MCG Cap; Take 1 capsule (0.25 mcg total) by mouth once daily.  Dispense: 90 capsule; Refill: 3    11. COPD with asthma  -     budesonide (PULMICORT) 0.5 mg/2 mL nebulizer solution; Take 2 mLs (0.5 mg total) by nebulization 2 (two) times daily. Controller  Dispense: 60 mL; Refill: 3    12. Edema of extremities    13. Obstructive sleep apnea syndrome  -     albuterol-ipratropium (DUO-NEB) 2.5 mg-0.5 mg/3 mL nebulizer solution; Take 3 mLs by nebulization daily 2 hours after breakfast. Rescue  Dispense: 75 mL; Refill: 0    14. Chronic obstructive pulmonary  disease, unspecified COPD type  -     albuterol-ipratropium (DUO-NEB) 2.5 mg-0.5 mg/3 mL nebulizer solution; Take 3 mLs by nebulization daily 2 hours after breakfast. Rescue  Dispense: 75 mL; Refill: 0    15. Peripheral edema  -     furosemide (LASIX) 40 MG tablet; Take 1 tablet (40 mg total) by mouth once daily.    16. Chronic diastolic CHF (congestive heart failure)  -     furosemide (LASIX) 40 MG tablet; Take 1 tablet (40 mg total) by mouth once daily.    Other orders  -     esomeprazole (NEXIUM) 20 MG capsule; Take 1 capsule (20 mg total) by mouth every other day.  -     losartan (COZAAR) 50 MG tablet; Take 1 tablet (50 mg total) by mouth once daily.  Dispense: 90 tablet; Refill: 3          Assessment & Plan    IMPRESSION:  - Assessed patient for COPD exacerbation and cardiac issues.  - Noted poor medication compliance, including underuse of prescribed inhalers and prednisone.  - Reviewed labs, previous XRs, and echo showing mild PA pressure over 37.  - Considered cardiac catheterization and right heart catheterization as suggested by Dr. Cheng.  - Continued cardiac workup and management with diuretics.    CHRONIC OBSTRUCTIVE PULMONARY DISEASE (COPD):  - Observed the patient's COPD for the last 2 weeks without nocturnal wheezing.  - Noted that the patient has been using albuterol inhaler but not as prescribed, and has not been using Budesonide, prednisone, and Duoneb as prescribed.  - Physical exam revealed respiratory stress and expiratory wheezes of the upper lungs at 40% expiration phase.  - Instructed to continue nebulizer treatments.  - Administered Duoneb 4 mg IM today.    PULMONARY HYPERTENSION:  - Previous echo showed mild PA pressure over 37.  - Dr. Cheng suggested cardiac catheterization and right heart catheterization for further evaluation.    HEART FAILURE:  - Continued spironolactone 3 times per week and Lasix daily.  - Instructed to monitor potassium and magnesium levels.  - Sammi denied chest  "pain and orthopnea, but reported nocturnal dyspnea and ankle swelling.  - Physical exam showed normal anterior-posterior diameter, no JVD, heart S1 S2, BMI not displaced.    OBESITY:  - Physical exam revealed obesity.  - Recommend weight loss.  - Provided detailed dietary recommendations, including smaller meals, chewing techniques, portion control, and specific calorie intake guidelines.  - Recommend weight loss through dietary modifications:  - Eat smaller meals.  - Chew food 27 times.  - Use "fork down" technique with every bite.  - Drink water with every bite.  - Use a plate 2/3 the size of original.  - Wait before taking seconds.  - Eat on schedule.  - Limit dinner to protein shakes or small protein meal under 180 calories.  - Limit other daily calories to less than 350.  - Recommend increasing water intake to 1.5 L daily.  - Recommend reducing salt intake:  - Read salt labels.  - Avoid frozen food, canned goods, and deli meats.  - Recommend incorporating beans or nuts as protein sources.  - Recommend choosing lean chicken and fish over red meat.  - Recommend rotating dairy product consumption.    SHORTNESS OF BREATH:  - Sammi reported nocturnal dyspnea.    LOCALIZED EDEMA:  - Sammi reported ankle swelling.  - Physical exam showed no edema of the lower extremities.  - Continued Lasix daily and instructed to monitor potassium levels.    MEDICATION UNDERDOSING:  - Noted that the patient has problems with medication compliance, not using Budesonide, prednisone, and Duoneb as prescribed.    FOLLOW-UP:  - Assessed follow-up from hospital and pending appointment with Dr. Cheng for cardiac workup.  - Scheduled follow up in 6 weeks with Mrs. Milligan, pending BMP, magnesium neuropeptide.  - Noted that the patient's weight has been stable.  - Ordered BMP, magnesium level, and neuropeptide level.  - Follow up in 6 weeks with Mrs. Milligan.   Controlled on current medications.  Continue current medications.Counseled " "patient on his ideal body weight, health consequences of being obese and current recommendations including weekly exercise and a heart healthy diet.  Current BMI is:Estimated body mass index is 40.44 kg/m² as calculated from the following:    Height as of 12/16/24: 5' 3" (1.6 m).    Weight as of this encounter: 103.5 kg (228 lb 4.6 oz)..  Patient is aware that ideal BMI < 25 or  .    This note was generated with the assistance of ambient listening technology. Verbal consent was obtained by the patient and accompanying visitor(s) for the recording of patient appointment to facilitate this note. I attest to having reviewed and edited the generated note for accuracy, though some syntax or spelling errors may persist. Please contact the author of this note for any clarification.  " H/O thyroid nodule    Hypertension    Malignant neoplasm of breast  s/p chemo and radiation 2014

## 2025-04-07 ENCOUNTER — APPOINTMENT (OUTPATIENT)
Dept: SURGERY | Facility: CLINIC | Age: 75
End: 2025-04-07
Payer: MEDICARE

## 2025-04-07 PROCEDURE — 99213K: CUSTOM

## 2025-04-10 ENCOUNTER — OUTPATIENT (OUTPATIENT)
Dept: OUTPATIENT SERVICES | Facility: HOSPITAL | Age: 75
LOS: 1 days | Discharge: HOME | End: 2025-04-10

## 2025-04-10 DIAGNOSIS — C50.919 MALIGNANT NEOPLASM OF UNSPECIFIED SITE OF UNSPECIFIED FEMALE BREAST: ICD-10-CM

## 2025-04-10 DIAGNOSIS — N60.02 SOLITARY CYST OF LEFT BREAST: Chronic | ICD-10-CM

## 2025-04-10 DIAGNOSIS — Z98.890 OTHER SPECIFIED POSTPROCEDURAL STATES: Chronic | ICD-10-CM

## 2025-04-10 DIAGNOSIS — Z90.10 ACQUIRED ABSENCE OF UNSPECIFIED BREAST AND NIPPLE: Chronic | ICD-10-CM

## 2025-04-10 DIAGNOSIS — E89.0 POSTPROCEDURAL HYPOTHYROIDISM: Chronic | ICD-10-CM

## 2025-04-15 ENCOUNTER — RESULT REVIEW (OUTPATIENT)
Age: 75
End: 2025-04-15

## 2025-04-15 ENCOUNTER — APPOINTMENT (OUTPATIENT)
Dept: HEMATOLOGY ONCOLOGY | Facility: CLINIC | Age: 75
End: 2025-04-15

## 2025-04-15 VITALS
BODY MASS INDEX: 25.2 KG/M2 | WEIGHT: 133.38 LBS | DIASTOLIC BLOOD PRESSURE: 77 MMHG | OXYGEN SATURATION: 97 % | TEMPERATURE: 97.7 F | HEART RATE: 72 BPM | RESPIRATION RATE: 17 BRPM | SYSTOLIC BLOOD PRESSURE: 123 MMHG

## 2025-04-15 DIAGNOSIS — C50.911 MALIGNANT NEOPLASM OF UNSPECIFIED SITE OF RIGHT FEMALE BREAST: ICD-10-CM

## 2025-04-15 LAB
BASOPHILS # BLD AUTO: 0.04 K/UL — SIGNIFICANT CHANGE UP (ref 0–0.2)
BASOPHILS NFR BLD AUTO: 0.5 % — SIGNIFICANT CHANGE UP (ref 0–2)
EOSINOPHIL # BLD AUTO: 0.2 K/UL — SIGNIFICANT CHANGE UP (ref 0–0.5)
EOSINOPHIL NFR BLD AUTO: 2.5 % — SIGNIFICANT CHANGE UP (ref 0–6)
HCT VFR BLD CALC: 40.7 % — SIGNIFICANT CHANGE UP (ref 34.5–45)
HGB BLD-MCNC: 13.8 G/DL — SIGNIFICANT CHANGE UP (ref 11.5–15.5)
IMM GRANULOCYTES NFR BLD AUTO: 0.3 % — SIGNIFICANT CHANGE UP (ref 0–0.9)
LYMPHOCYTES # BLD AUTO: 2.33 K/UL — SIGNIFICANT CHANGE UP (ref 1–3.3)
LYMPHOCYTES # BLD AUTO: 29.4 % — SIGNIFICANT CHANGE UP (ref 13–44)
MCHC RBC-ENTMCNC: 30.5 PG — SIGNIFICANT CHANGE UP (ref 27–34)
MCHC RBC-ENTMCNC: 33.9 G/DL — SIGNIFICANT CHANGE UP (ref 32–36)
MCV RBC AUTO: 90 FL — SIGNIFICANT CHANGE UP (ref 80–100)
MONOCYTES # BLD AUTO: 0.69 K/UL — SIGNIFICANT CHANGE UP (ref 0–0.9)
MONOCYTES NFR BLD AUTO: 8.7 % — SIGNIFICANT CHANGE UP (ref 2–14)
NEUTROPHILS # BLD AUTO: 4.64 K/UL — SIGNIFICANT CHANGE UP (ref 1.8–7.4)
NEUTROPHILS NFR BLD AUTO: 58.6 % — SIGNIFICANT CHANGE UP (ref 43–77)
NRBC BLD AUTO-RTO: 0 /100 WBCS — SIGNIFICANT CHANGE UP (ref 0–0)
PLATELET # BLD AUTO: 344 K/UL — SIGNIFICANT CHANGE UP (ref 150–400)
RBC # BLD: 4.52 M/UL — SIGNIFICANT CHANGE UP (ref 3.8–5.2)
RBC # FLD: 13.3 % — SIGNIFICANT CHANGE UP (ref 10.3–14.5)
WBC # BLD: 7.92 K/UL — SIGNIFICANT CHANGE UP (ref 3.8–10.5)
WBC # FLD AUTO: 7.92 K/UL — SIGNIFICANT CHANGE UP (ref 3.8–10.5)

## 2025-04-15 PROCEDURE — 99214 OFFICE O/P EST MOD 30 MIN: CPT

## 2025-04-16 LAB
ALBUMIN SERPL ELPH-MCNC: 4.4 G/DL
ALP BLD-CCNC: 53 U/L
ALT SERPL-CCNC: 18 U/L
ANION GAP SERPL CALC-SCNC: 14 MMOL/L
AST SERPL-CCNC: 18 U/L
BILIRUB SERPL-MCNC: 0.4 MG/DL
BUN SERPL-MCNC: 17 MG/DL
CALCIUM SERPL-MCNC: 9.6 MG/DL
CHLORIDE SERPL-SCNC: 107 MMOL/L
CO2 SERPL-SCNC: 23 MMOL/L
CREAT SERPL-MCNC: 0.8 MG/DL
EGFRCR SERPLBLD CKD-EPI 2021: 78 ML/MIN/1.73M2
GLUCOSE SERPL-MCNC: 95 MG/DL
POTASSIUM SERPL-SCNC: 4.1 MMOL/L
PROT SERPL-MCNC: 6.8 G/DL
SODIUM SERPL-SCNC: 144 MMOL/L

## 2025-04-29 ENCOUNTER — APPOINTMENT (OUTPATIENT)
Dept: HEMATOLOGY ONCOLOGY | Facility: CLINIC | Age: 75
End: 2025-04-29

## 2025-04-29 ENCOUNTER — APPOINTMENT (OUTPATIENT)
Dept: INFUSION THERAPY | Facility: HOSPITAL | Age: 75
End: 2025-04-29

## 2025-04-29 ENCOUNTER — RESULT REVIEW (OUTPATIENT)
Age: 75
End: 2025-04-29

## 2025-04-29 DIAGNOSIS — C50.919 MALIGNANT NEOPLASM OF UNSPECIFIED SITE OF UNSPECIFIED FEMALE BREAST: ICD-10-CM

## 2025-04-29 LAB
BASOPHILS # BLD AUTO: 0.1 K/UL — SIGNIFICANT CHANGE UP (ref 0–0.2)
BASOPHILS NFR BLD AUTO: 0.8 % — SIGNIFICANT CHANGE UP (ref 0–2)
EOSINOPHIL # BLD AUTO: 0.25 K/UL — SIGNIFICANT CHANGE UP (ref 0–0.5)
EOSINOPHIL NFR BLD AUTO: 1.9 % — SIGNIFICANT CHANGE UP (ref 0–6)
HCT VFR BLD CALC: 41.3 % — SIGNIFICANT CHANGE UP (ref 34.5–45)
HGB BLD-MCNC: 14.2 G/DL — SIGNIFICANT CHANGE UP (ref 11.5–15.5)
IMM GRANULOCYTES NFR BLD AUTO: 1.2 % — HIGH (ref 0–0.9)
LYMPHOCYTES # BLD AUTO: 15.5 % — SIGNIFICANT CHANGE UP (ref 13–44)
LYMPHOCYTES # BLD AUTO: 2 K/UL — SIGNIFICANT CHANGE UP (ref 1–3.3)
MCHC RBC-ENTMCNC: 30.4 PG — SIGNIFICANT CHANGE UP (ref 27–34)
MCHC RBC-ENTMCNC: 34.4 G/DL — SIGNIFICANT CHANGE UP (ref 32–36)
MCV RBC AUTO: 88.4 FL — SIGNIFICANT CHANGE UP (ref 80–100)
MONOCYTES # BLD AUTO: 1.28 K/UL — HIGH (ref 0–0.9)
MONOCYTES NFR BLD AUTO: 9.9 % — SIGNIFICANT CHANGE UP (ref 2–14)
NEUTROPHILS # BLD AUTO: 9.16 K/UL — HIGH (ref 1.8–7.4)
NEUTROPHILS NFR BLD AUTO: 70.7 % — SIGNIFICANT CHANGE UP (ref 43–77)
NRBC BLD AUTO-RTO: 0 /100 WBCS — SIGNIFICANT CHANGE UP (ref 0–0)
PLATELET # BLD AUTO: 343 K/UL — SIGNIFICANT CHANGE UP (ref 150–400)
RBC # BLD: 4.67 M/UL — SIGNIFICANT CHANGE UP (ref 3.8–5.2)
RBC # FLD: 13.6 % — SIGNIFICANT CHANGE UP (ref 10.3–14.5)
WBC # BLD: 12.94 K/UL — HIGH (ref 3.8–10.5)
WBC # FLD AUTO: 12.94 K/UL — HIGH (ref 3.8–10.5)

## 2025-04-30 DIAGNOSIS — M81.0 AGE-RELATED OSTEOPOROSIS WITHOUT CURRENT PATHOLOGICAL FRACTURE: ICD-10-CM
